# Patient Record
Sex: MALE | Race: WHITE | NOT HISPANIC OR LATINO | Employment: FULL TIME | URBAN - METROPOLITAN AREA
[De-identification: names, ages, dates, MRNs, and addresses within clinical notes are randomized per-mention and may not be internally consistent; named-entity substitution may affect disease eponyms.]

---

## 2017-02-10 ENCOUNTER — OCCUPATIONAL MEDICINE (OUTPATIENT)
Dept: URGENT CARE | Facility: PHYSICIAN GROUP | Age: 52
End: 2017-02-10
Payer: COMMERCIAL

## 2017-02-10 VITALS
OXYGEN SATURATION: 96 % | RESPIRATION RATE: 20 BRPM | DIASTOLIC BLOOD PRESSURE: 78 MMHG | HEIGHT: 65 IN | SYSTOLIC BLOOD PRESSURE: 126 MMHG | HEART RATE: 87 BPM | TEMPERATURE: 99 F | WEIGHT: 262 LBS | BODY MASS INDEX: 43.65 KG/M2

## 2017-02-10 DIAGNOSIS — Z02.1 PRE-EMPLOYMENT DRUG SCREENING: ICD-10-CM

## 2017-02-10 DIAGNOSIS — S83.92XA SPRAIN OF LEFT KNEE, UNSPECIFIED LIGAMENT, INITIAL ENCOUNTER: ICD-10-CM

## 2017-02-10 LAB
AMP AMPHETAMINE: NORMAL
COC COCAINE: NORMAL
INT CON NEG: NEGATIVE
INT CON POS: POSITIVE
MET METHAMPHETAMINES: NORMAL
OPI OPIATES: NORMAL
PCP PHENCYCLIDINE: NORMAL
POC DRUG COMMENT 753798-OCCUPATIONAL HEALTH: NORMAL
THC: NORMAL

## 2017-02-10 PROCEDURE — 80305 DRUG TEST PRSMV DIR OPT OBS: CPT | Mod: 29 | Performed by: PHYSICIAN ASSISTANT

## 2017-02-10 PROCEDURE — 99202 OFFICE O/P NEW SF 15 MIN: CPT | Mod: 29 | Performed by: PHYSICIAN ASSISTANT

## 2017-02-10 ASSESSMENT — PAIN SCALES - GENERAL: PAINLEVEL: 7=MODERATE-SEVERE PAIN

## 2017-02-10 NOTE — Clinical Note
"EMPLOYEE’S CLAIM FOR COMPENSATION/ REPORT OF INITIAL TREATMENT  FORM C-4    EMPLOYEE’S CLAIM - PROVIDE ALL INFORMATION REQUESTED   First Name  Mingo Last Name  Елена Birthdate                    1965                Sex  male Claim Number   Home Address  [address] Age  51 y.o. Height  1.651 m (5' 5\") Weight  118.842 kg (262 lb) [facility]     [place] Zip  [address] Telephone  [phone] (home)    Mailing Address  [address] [place] Zip  [address] Primary Language Spoken  English    Insurer   Third Party   Maria Teresa   Employee's Occupation (Job Title) When Injury or Occupational Disease Occurred       Employer's Name  [email or web address]  Telephone  [phone]    Employer Address  [address]  [place]  Zip  [address]    Date of Injury  1/14/2017               Hour of Injury  11:30 AM Date Employer Notified  1/23/2017 Last Day of Work after Injury or Occupational Disease  2/9/2017 Supervisor to Whom Injury Reported  Raimundo Bustos   Address or Location of Accident (if applicable)  [[facility]]   What were you doing at the time of accident? (if applicable)  Lifting 50+ lb boxes from container from floor.    How did this injury or occupational disease occur? (Be specific an answer in detail. Use additional sheet if necessary)  I was lifting over 68 boxes (50 lb each) from floor of container or to pallets using   proper lifting technique. I felt no pain while preforming this task. I though I was fine when  I went home that day (1/14/17). When I woke up Sunday (1/15/17) I could not bend  my left knee and it hurt so bad I could hardly walk on it that Sunday. I believe I over  worked my left knee which had been injured 26 yrs ago. I went to work on Monday   (1/16/17) and struggled to make it through the week, but I did. Monday, 1/23/17, I  called Raimundo" Akash and went to the doctor at [facility] and stayed home the rest of the   day. I returned to work 1/24/17 for light duty.   If you believe that you have an occupational disease, when did you first have knowledge of the disability and it relationship to your employment?  N/A Witnesses to the Accident  N/A      Nature of Injury or Occupational Disease  Strain  Part(s) of Body Injured or Affected  Knee (L), ,     I certify that the above is true and correct to the best of my knowledge and that I have provided this information in order to obtain the benefits of [place]  I hereby authorize any physician, chiropractor, surgeon, practitioner, or other person, any hospital, including [facility] or [facility], any medical service organization, any insurance company, or other institution or organization to release to each other, any medical or other information, including benefits paid or payable, pertinent to this injury or disease, except information relative to diagnosis, treatment and/or counseling for AIDS, psychological conditions, alcohol or controlled substances, for which I must give specific authorization.  A Photostat of this authorization shall be as valid as the original.     Date   Place   Employee’s Signature   THIS REPORT MUST BE COMPLETED AND MAILED WITHIN 3 WORKING DAYS OF TREATMENT   Place  [facility] [place]  Name of Facility  [facility]   Date  2/10/2017 Diagnosis  (Z02.1) Pre-employment drug screening  (S83.92XA) Sprain of left knee, unspecified ligament, initial encounter Is there evidence the injured employee was under the influence of alcohol and/or another controlled substance at the time of accident?   Hour  9:51 AM Description of Injury or Disease  Diagnoses of Pre-employment drug screening and Sprain of left knee, unspecified ligament, initial encounter were  pertinent to this visit. No   Treatment  Appears to be a minor left knee sprain.  He had an x-ray completed MEI which was negative.  We will order physical therapy  OTC meds for pain and swelling  Follow-up in one week  Have you advised the patient to remain off work five days or more? No   X-Ray Findings  Negative  Comments:x-ray completed MEI   If Yes   From Date  To Date      From information given by the employee, together with medical evidence, can you directly connect this injury or occupational disease as job incurred?  No  Comments:??? no obvious incident. He was lifting several heavy boxes however his pain started the next day. Possibly a work injury. Could be a result of his traumatic knee injury 26 years ago If No Full Duty  No Modified Duty  Yes   Is additional medical care by a physician indicated?  Yes If Modified Duty, Specify any Limitations / Restrictions  No climbing, kneeling, squatting  Weight limit 25 pounds   Do you know of any previous injury or disease contributing to this condition or occupational disease?                            Yes  Comments:Severe left knee injury ~26 years ago. Tore all 4 ligaments, nerve damage with subsequent drop foot. Pt had surgery. Minor tweaks throughout years, nothing major.   Date  2/15/2017 Print Doctor’s Name Dania Cottrell PA-C I certify the employer’s copy of  this form was mailed on:   Address  [address] [place] [address]. [address] Insurer’s Use Only     [facility]  [id number]    Provider’s Tax ID Number  [id number]  Telephone  Dept: [phone]        hiram-DANIA Freeman PA-C   e-Signature: Dr. Junior Sullivan, Medical Director Degree  [place]        ORIGINAL-TREATING PHYSICIAN OR CHIROPRACTOR    PAGE 2-INSURER/TPA    PAGE 3-EMPLOYER    PAGE 4-EMPLOYEE             Form C-4 (rev10/07)              BRIEF DESCRIPTION OF RIGHTS AND BENEFITS  (Pursuant to [place])    Notice of Injury or Occupational Disease (Incident Report Form C-1): If an injury  "or occupational disease (OD) arises out of and in the  course of employment, you must provide written notice to your employer as soon as practicable, but no later than 7 days after the accident or  OD. Your employer shall maintain a sufficient supply of the required forms.    Claim for Compensation (Form C-4): If medical treatment is sought, the form C-4 is available at the place of initial treatment. A completed  \"Claim for Compensation\" (Form C-4) must be filed within 90 days after an accident or OD. The treating physician or chiropractor must,  within 3 working days after treatment, complete and mail to the employer, the employer's insurer and third-party , the Claim for  Compensation.    Medical Treatment: If you require medical treatment for your on-the-job injury or OD, you may be required to select a physician or  chiropractor from a list provided by your workers’ compensation insurer, if it has contracted with an Organization for Managed Care (MCO) or  Preferred Provider Organization (PPO) or providers of health care. If your employer has not entered into a contract with an MCO or PPO, you  may select a physician or chiropractor from the Panel of Physicians and Chiropractors. Any medical costs related to your industrial injury or  OD will be paid by your insurer.    Temporary Total Disability (TTD): If your doctor has certified that you are unable to work for a period of at least 5 consecutive days, or 5  cumulative days in a 20-day period, or places restrictions on you that your employer does not accommodate, you may be entitled to TTD  compensation.    Temporary Partial Disability (TPD): If the wage you receive upon reemployment is less than the compensation for TTD to which you are  entitled, the insurer may be required to pay you TPD compensation to make up the difference. TPD can only be paid for a maximum of 24  months.    Permanent Partial Disability (PPD): When your medical condition is " stable and there is an indication of a PPD as a result of your injury or  OD, within 30 days, your insurer must arrange for an evaluation by a rating physician or chiropractor to determine the degree of your PPD. The  amount of your PPD award depends on the date of injury, the results of the PPD evaluation and your age and wage.    Permanent Total Disability (PTD): If you are medically certified by a treating physician or chiropractor as permanently and totally disabled  and have been granted a PTD status by your insurer, you are entitled to receive monthly benefits not to exceed 66 2/3% of your average  monthly wage. The amount of your PTD payments is subject to reduction if you previously received a PPD award.    Vocational Rehabilitation Services: You may be eligible for vocational rehabilitation services if you are unable to return to the job due to a  permanent physical impairment or permanent restrictions as a result of your injury or occupational disease.    Transportation and Per Sarah Reimbursement: You may be eligible for travel expenses and per sarah associated with medical treatment.    Reopening: You may be able to reopen your claim if your condition worsens after claim closure.    Appeal Process: If you disagree with a written determination issued by the insurer or the insurer does not respond to your request, you may  appeal to the Department of Administration, , by following the instructions contained in your determination letter. You must  appeal the determination within 70 days from the date of the determination letter at [address], [address], [place], [place]  [address], or [address], [address], [place] [address]. If you disagree with the  decision, you may appeal to the  Department of Administration, . You must file your appeal within 30 days from the date of the  decision  letter at [address],  [address], [place], [place] [address], or [address], [place], [place] [address]. If you  disagree with a decision of an , you may file a petition for judicial review with the District Court. You must do so within 30  days of the Appeal Officer’s decision. You may be represented by an  at your own expense or you may contact the [facility] for possible  representation.    [place]  for Injured Workers (NAIW): If you disagree with a  decision, you may request that NAIW represent you  without charge at an  Hearing. For information regarding denial of benefits, you may contact the [facility] at: [address], [address], [place] [address], [phone], or [address], [place] [address], [phone]    To File a Complaint with the Division: If you wish to file a complaint with the  of the Division of Industrial Relations (DIR),  please contact the Workers’ Compensation Section, [address], [address], [place], [place] [address], telephone [phone]) [phone], or  [address], [address], [place], [place] [address], telephone [phone].    For assistance with Workers’ Compensation Issues: you may contact the Office of the Governor Consumer Health Assistance, [address], [address], [place], [place] [address], Toll Free 1-793.235.1113, Web site: http://govcha.[facility].nv., E-mail  Isela@govcha.[facility].nv.                                                                                                                                                                                                                                   __________________________________________________________________                                                                   _________________                Employee Name / Signature                                                                                                                                                        Date                                                                                                                                                                                                     D-2 (rev. 10/07)

## 2017-02-10 NOTE — Clinical Note
"EMPLOYEE’S CLAIM FOR COMPENSATION/ REPORT OF INITIAL TREATMENT  FORM C-4    EMPLOYEE’S CLAIM - PROVIDE ALL INFORMATION REQUESTED   First Name  Mingo Last Name  Елена Birthdate                    1965                Sex  male Claim Number   Home Address  [address] Age  51 y.o. Height  1.651 m (5' 5\") Weight  118.842 kg (262 lb) N  [id number]   [facility] Zip  [address] Telephone  [phone] (home)    Mailing Address  [address] [facility] Zip  [address] Primary Language Spoken  English    Insurer  [place] Third Party   [place]   Employee's Occupation (Job Title) When Injury or Occupational Disease Occurred       Employer's Name  [employer]  Telephone  [phone]    Employer Address  [address]  [facility]  Zip  [address]   Date of Injury  1/14/2017               Hour of Injury  11:30 AM Date Employer Notified  1/23/2017 Last Day of Work after Injury or Occupational Disease  2/9/2017 Supervisor to Whom Injury Reported  Raimundo Bustos   Address or Location of Accident (if applicable)  [[facility]]   What were you doing at the time of accident? (if applicable)  Lifting 50+ lb boxes from container from floor.    How did this injury or occupational disease occur? (Be specific an answer in detail. Use additional sheet if necessary)  I was lifting over 68 boxes (50 lb each) from floor of container or to pallets using   proper lifting technique. I felt no pain while preforming this task. I though I was fine when  I went home that day (1/14/17). When I woke up Sunday (1/15/17) I could not bend  my left knee and it hurt so bad I could hardly walk on it that Sunday. I believe I over  worked my left knee which had been injured 26 yrs ago. I went to work on Monday   (1/16/17) and struggled to make it through the week, but I did. Monday, 1/23/17, I  called " Raimundo Bustos and went to the doctor at [facility] and stayed home the rest of the   day. I returned to work 1/24/17 for light duty.   If you believe that you have an occupational disease, when did you first have knowledge of the disability and it relationship to your employment?  N/A Witnesses to the Accident  N/A      Nature of Injury or Occupational Disease  Strain  Part(s) of Body Injured or Affected  Knee (L), ,     I certify that the above is true and correct to the best of my knowledge and that I have provided this information in order to obtain the benefits of [place] inclusive or Chapter 617 of [place]).  I hereby authorize any physician, chiropractor, surgeon, practitioner, or other person, any hospital, including [facility] or [facility], any medical service organization, any insurance company, or other institution or organization to release to each other, any medical or other information, including benefits paid or payable, pertinent to this injury or disease, except information relative to diagnosis, treatment and/or counseling for AIDS, psychological conditions, alcohol or controlled substances, for which I must give specific authorization.  A Photostat of this authorization shall be as valid as the original.     Date   Place   Employee’s Signature   THIS REPORT MUST BE COMPLETED AND MAILED WITHIN 3 WORKING DAYS OF TREATMENT   Place  [facility]  Name of Facility  [place]   Date  2/10/2017 Diagnosis  (Z02.1) Pre-employment drug screening  (S83.92XA) Sprain of left knee, unspecified ligament, initial encounter Is there evidence the injured employee was under the influence of alcohol and/or another controlled substance at the time of accident?   Hour  9:51 AM Description of Injury or Disease  Diagnoses of Pre-employment drug screening and Sprain of left knee, unspecified ligament, initial encounter  were pertinent to this visit. No   Treatment  Appears to be a minor left knee sprain.  He had an x-ray completed [facility] which was negative.  We will order physical therapy  OTC meds for pain and swelling  Follow-up in one week  Have you advised the patient to remain off work five days or more? No   X-Ray Findings  Negative  Comments:x-ray completed [employer]   If Yes   From Date  To Date      From information given by the employee, together with medical evidence, can you directly connect this injury or occupational disease as job incurred?  Yes If No Full Duty  No Modified Duty  Yes   Is additional medical care by a physician indicated?  Yes If Modified Duty, Specify any Limitations / Restrictions  No climbing, kneeling, squatting  Weight limit 25 pounds   Do you know of any previous injury or disease contributing to this condition or occupational disease?                            Yes  Comments:Severe left knee injury ~26 years ago. Tore all 4 ligaments, nerve damage with subsequent drop foot. Pt had surgery. Minor tweaks throughout years, nothing major.   Date  2/10/2017 Print Doctor’s Name Dania Cottrell PA-C I certify the employer’s copy of  this form was mailed on:   Address  [address] [place] [address]. [address] Insurer’s Use Only     [facility]  [address]    Provider’s Tax ID Number  [id number]  Telephone  Dept: [phone]        e-DANIA Freeman PA-C   e-Signature: Dr. Junior Sullivan, Medical Director Degree  [place]        ORIGINAL-TREATING PHYSICIAN OR CHIROPRACTOR    PAGE 2-INSURER/TPA    PAGE 3-EMPLOYER    PAGE 4-EMPLOYEE             Form C-4 (rev10/07)              BRIEF DESCRIPTION OF RIGHTS AND BENEFITS  (Pursuant to NRS 616C.050)    Notice of Injury or Occupational Disease (Incident Report Form C-1): If an injury or occupational disease (OD) arises out of and in the  course of employment, you must provide written notice to your employer as soon as practicable, but no later than 7 days after the  "accident or  OD. Your employer shall maintain a sufficient supply of the required forms.    Claim for Compensation (Form C-4): If medical treatment is sought, the form C-4 is available at the place of initial treatment. A completed  \"Claim for Compensation\" (Form C-4) must be filed within 90 days after an accident or OD. The treating physician or chiropractor must,  within 3 working days after treatment, complete and mail to the employer, the employer's insurer and third-party , the Claim for  Compensation.    Medical Treatment: If you require medical treatment for your on-the-job injury or OD, you may be required to select a physician or  chiropractor from a list provided by your workers’ compensation insurer, if it has contracted with an Organization for Managed Care (MCO) or  Preferred Provider Organization (PPO) or providers of health care. If your employer has not entered into a contract with an MCO or PPO, you  may select a physician or chiropractor from the Panel of Physicians and Chiropractors. Any medical costs related to your industrial injury or  OD will be paid by your insurer.    Temporary Total Disability (TTD): If your doctor has certified that you are unable to work for a period of at least 5 consecutive days, or 5  cumulative days in a 20-day period, or places restrictions on you that your employer does not accommodate, you may be entitled to TTD  compensation.    Temporary Partial Disability (TPD): If the wage you receive upon reemployment is less than the compensation for TTD to which you are  entitled, the insurer may be required to pay you TPD compensation to make up the difference. TPD can only be paid for a maximum of 24  months.    Permanent Partial Disability (PPD): When your medical condition is stable and there is an indication of a PPD as a result of your injury or  OD, within 30 days, your insurer must arrange for an evaluation by a rating physician or chiropractor to " determine the degree of your PPD. The  amount of your PPD award depends on the date of injury, the results of the PPD evaluation and your age and wage.    Permanent Total Disability (PTD): If you are medically certified by a treating physician or chiropractor as permanently and totally disabled  and have been granted a PTD status by your insurer, you are entitled to receive monthly benefits not to exceed 66 2/3% of your average  monthly wage. The amount of your PTD payments is subject to reduction if you previously received a PPD award.    Vocational Rehabilitation Services: You may be eligible for vocational rehabilitation services if you are unable to return to the job due to a  permanent physical impairment or permanent restrictions as a result of your injury or occupational disease.    Transportation and Per Sarah Reimbursement: You may be eligible for travel expenses and per sarah associated with medical treatment.    Reopening: You may be able to reopen your claim if your condition worsens after claim closure.    Appeal Process: If you disagree with a written determination issued by the insurer or the insurer does not respond to your request, you may  appeal to the Department of Administration, , by following the instructions contained in your determination letter. You must  appeal the determination within 70 days from the date of the determination letter at [address], [address], [place], [place]  [address], or [address] [facility] [address] [facility] [address], [place] [address]. If you disagree with the  decision, you may appeal to the  Department of Administration, . You must file your appeal within 30 days from the date of the  decision  letter at [address], [address], [place], [place] [address], or [address] [facility] [address], [place] [address]. If you  disagree with a decision of an , you may file a petition for  judicial review with the District Court. You must do so within 30  days of the Appeal Officer’s decision. You may be represented by an  at your own expense or you may contact the [facility] for possible  representation.    [place]  for Injured Workers (NAIW): If you disagree with a  decision, you may request that NAIW represent you  without charge at an  Hearing. For information regarding denial of benefits, you may contact the [facility] at: [address], [place] [address], [phone], or [address], [address], [place] [address], [phone]    To File a Complaint with the Division: If you wish to file a complaint with the  of the Division of Industrial Relations (DIR),  please contact the Workers’ Compensation Section, [address], [address], [place], [place] [address], telephone [phone], or  [address], [place] [address], telephone [phone].    For assistance with Workers’ Compensation Issues: you may contact the Office of the Governor Consumer Health Assistance, [address] [place] [address], [address], [place], [place] [address], Toll Free 1-180.365.6706, Web site: http://govcha.Critical access [email or web address]., E-mail  [email or web address].                                                                                                                                                                                                                                   __________________________________________________________________                                                                   _________________                Employee Name / Signature                                                                                                                                                       Date                                                                                                                                                                                                      D-2 (rev. 10/07)

## 2017-02-10 NOTE — MR AVS SNAPSHOT
"        Mingo Елена   2/10/2017 9:10 AM   Occupational Medicine   MRN: [id number]    Department:  [facility]   Dept Phone:  [phone]    Description:  Male : 1965   Provider:  Mingo Cottrell PA-C           Reason for Visit     Knee Injury WC IV 17 Lt Knee Injury. Pt states he injured his Lt knee after lifting boxes at work. Pain when walking. Pt has hx of knee injuries.      Allergies as of 2/10/2017     Allergen Noted Reactions    Erythromycin 02/15/2016   Unspecified    Pt states \"Years ago he had a high temp\".      You were diagnosed with     Pre-employment drug screening   [014152]       Sprain of left knee, unspecified ligament, initial encounter   [8980416]         Vital Signs     Blood Pressure Pulse Temperature Respirations Height Weight    126/78 mmHg 87 37.2 °C (99 °F) 20 1.651 m (5' 5\") 118.842 kg (262 lb)    Body Mass Index Oxygen Saturation Smoking Status             43.60 kg/m2 96% Never Smoker          Basic Information     Date Of Birth Sex Race Ethnicity Preferred Language    1965 Male White Non- English      Your appointments     2017  3:30 PM   Workers Compensation with [facility])    [address]  [place] [address] [phone]              Problem List              ICD-10-CM Priority Class Noted - Resolved    Atypical chest pain R07.89   2/15/2016 - Present      Health Maintenance        Date Due Completion Dates    IMM DTaP/Tdap/Td Vaccine (1 - Tdap) 1983 ---    COLONOSCOPY 2014 ---    IMM INFLUENZA (1) 2016 ---            Results     POCT 6 Panel Urine Drug Screen      Component    AMPHETAMINE    Neg    POC THC    Neg    COCAINE    Neg    OPIATES    Neg    PHENCYCLIDINE    Neg    METHAMPHETAMINES    Neg    POC Urine Drug Screen Comment    N/A    Internal Control Positive    Positive    Internal Control Negative    Negative                        Current " Immunizations     No immunizations on file.      Below and/or attached are the medications your provider expects you to take. Review all of your home medications and newly ordered medications with your provider and/or pharmacist. Follow medication instructions as directed by your provider and/or pharmacist. Please keep your medication list with you and share with your provider. Update the information when medications are discontinued, doses are changed, or new medications (including over-the-counter products) are added; and carry medication information at all times in the event of emergency situations     Allergies:  ERYTHROMYCIN - Unspecified               Medications  Valid as of: February 10, 2017 - 10:25 AM    Generic Name Brand Name Tablet Size Instructions for use    Albuterol Sulfate (Aero Soln) albuterol 108 (90 BASE) MCG/ACT Inhale 2 Puffs by mouth every 6 hours as needed for Shortness of Breath.        Allopurinol (Tab) ZYLOPRIM 300 MG Take 300 mg by mouth every day.        Atenolol (Tab) TENORMIN 25 MG Take 0.5 Tabs by mouth every day. Do not take if Systolic Blood Pressure <110, or Heart Rate <60        Fluticasone-Salmeterol (Aerosol) ADVAIR -21 MCG/ACT Inhale 2 Puffs by mouth 2 times a day.        Lisinopril (Tab) PRINIVIL 10 MG Take 0.5 Tabs by mouth every day.        Multiple Vitamins-Minerals (Tab) THERAGRAN-M  Take 2 Tabs by mouth every day.        .                 Medicines prescribed today were sent to:     [facility] [address] [place] [address] (), [place] - [address]    [address] () [place] [address]    Phone: [phone] Fax: [phone]    Open 24 Hours?: No      Medication refill instructions:       If your prescription bottle indicates you have medication refills left, it is not necessary to call your provider’s office. Please contact your pharmacy and they will refill your medication.    If your prescription bottle indicates you do not have any refills left, you may  request refills at any time through one of the following ways: The online OneWire system (except Urgent Care), by calling your provider’s office, or by asking your pharmacy to contact your provider’s office with a refill request. Medication refills are processed only during regular business hours and may not be available until the next business day. Your provider may request additional information or to have a follow-up visit with you prior to refilling your medication.   *Please Note: Medication refills are assigned a new Rx number when refilled electronically. Your pharmacy may indicate that no refills were authorized even though a new prescription for the same medication is available at the pharmacy. Please request the medicine by name with the pharmacy before contacting your provider for a refill.        Referral     A referral request has been sent to our patient care coordination department. Please allow 3-5 business days for us to process this request and contact you either by phone or mail. If you do not hear from us by the 5th business day, please call us at [phone].           OneWire Access Code: [email or web address]  Expires: 3/12/2017 10:25 AM    OneWire  A secure, online tool to manage your health information     [employer]’s OneWire® is a secure, online tool that connects you to your personalized health information from the privacy of your home -- day or night - making it very easy for you to manage your healthcare. Once the activation process is completed, you can even access your medical information using the OneWire adriano, which is available for free in the Apple Adriano store or Google Play store.     OneWire provides the following levels of access (as shown below):   My Chart Features   [facility] Doctor [facility]  Specialists [facility]  Urgent  Care Non-[facility]  Primary Care  Doctor   Email your healthcare team securely and privately 24/7 X X X    Manage appointments: schedule your next  appointment; view details of past/upcoming appointments X      Request prescription refills. X      View recent personal medical records, including lab and immunizations X X X X   View health record, including health history, allergies, medications X X X X   Read reports about your outpatient visits, procedures, consult and ER notes X X X X   See your discharge summary, which is a recap of your hospital and/or ER visit that includes your diagnosis, lab results, and care plan. X X       How to register for Aff[employer] Edge:  1. Go to  [email or web address].  2. Click on the Sign Up Now box, which takes you to the New Member Sign Up page. You will need to provide the following information:  a. Enter your Affinity Edge Access Code exactly as it appears at the top of this page. (You will not need to use this code after you’ve completed the sign-up process. If you do not sign up before the expiration date, you must request a new code.)   b. Enter your date of birth.   c. Enter your home email address.   d. Click Submit, and follow the next screen’s instructions.  3. Create a Affinity Edge ID. This will be your Affinity Edge login ID and cannot be changed, so think of one that is secure and easy to remember.  4. Create a Affinity Edge password. You can change your password at any time.  5. Enter your Password Reset Question and Answer. This can be used at a later time if you forget your password.   6. Enter your e-mail address. This allows you to receive e-mail notifications when new information is available in Affinity Edge.  7. Click Sign Up. You can now view your health information.    For assistance activating your Affinity Edge account, call [phone]

## 2017-02-10 NOTE — Clinical Note
[facility]   [address] - [place] [address]  Phone: [phone] - Fax: [phone]        [facility] Progress Report and Disability Certification  Date of Service: 2/10/2017   No Show:  No  Date / Time of Next Visit: 2/17/2017   Claim Information   Patient Name: Mingo Christiansen  Claim Number:     Employer: [employer]  Date of Injury: 1/14/2017     Insurer / TPA: [employer]  ID / SSN:     Occupation:    Diagnosis: Diagnoses of Pre-employment drug screening and Sprain of left knee, unspecified ligament, initial encounter were pertinent to this visit.    Medical Information   Related to Industrial Injury? No  Comments:??? no obvious incident. He was lifting several heavy boxes however his pain started the next day. Possibly a work injury. Could be a result of his traumatic knee injury 26 years ago    Subjective Complaints:  DOI: 1/14/17. Left knee injury. Was lifting 60-70 boxes, no pain or injury initially. Pain started the next morning, severe stiffness. Pain with walking. 10/10 at the time. Patient continued to work through the pain. On 1/23/17 patient went to [facility]. Had xray which showed severe osteoarthritis, no acute injury. He was given naproxen, PT referral, and light duty at work. Pt found out he is not contracted with [employer] and was told to come to [facility] for further workup and management. Taking NSAIDS prn, mild relief. Pt is continuing light duty.  Today, knee is improved. Pain is decreasing, 6-7/10 at worst. Tolerating light duty. Previous injury: Severe left knee injury ~26 years ago. Tore all 4 ligaments, nerve damage with subsequent drop foot. Pt had surgery. Minor tweaks throughout years, nothing major.   Objective Findings: Vitals review, well-appearing male in no apparent distress.  Tenderness over the anterior medial joint line of the left knee. Some mild edema. Range of motion within normal  limits. Some pain with varus stress. Anterior/posterior drawer negative, Lachman's negative. Stable knee. Ambulation normal. Neurovascularly at baseline.   Pre-Existing Condition(s): Severe left knee injury ~26 years ago. Tore all 4 ligaments, nerve damage with subsequent drop foot. Pt had surgery. Minor tweaks throughout years, nothing major.     Assessment:   Initial Visit    Status: Additional Care Required  Permanent Disability:No    Plan: Medication  Comments:OTC prn    Diagnostics: X-ray    Comments:  Completed at [facility] -  Negative for acute injury    Disability Information   Status: Released to Restricted Duty    From:  2/10/2017  Through: 2017 Restrictions are: Temporary   Physical Restrictions   Sitting:    Standing:    Stooping:    Bending:      Squattin hrs/day Walking:    Climbin hrs/day Pushing:      Pulling:    Other:    Reaching Above Shoulder (L):   Reaching Above Shoulder (R):       Reaching Below Shoulder (L):    Reaching Below Shoulder (R):      Not to exceed Weight Limits   Carrying(hrs):   Weight Limit(lb): < or = to 25 pounds Lifting(hrs):   Weight  Limit(lb): < or = to 25 pounds   Comments:      Repetitive Actions   Hands: i.e. Fine Manipulations from Grasping:     Feet: i.e. Operating Foot Controls:     Driving / Operate Machinery:     Physician Name: Dania Cottrell PA-C Physician Signature: DANIA Mohan PA-C e-Signature: Dr. Junior Sullivan, Medical Director   Clinic Name / Location: [address]  [place] [address] Clinic Phone Number: Dept: [phone]   Appointment Time: 9:10 Am Visit Start Time: 9:51 AM   Check-In Time:  9:34 Am Visit Discharge Time: 10:25 Am    Original-Treating Physician or Chiropractor    Page 2-Insurer/TPA    Page 3-Employer    Page 4-Employee

## 2017-02-10 NOTE — PROGRESS NOTES
"Subjective:      Mingo Christiansen is a 51 y.o. male who presents with Knee Injury      DOI: 1/14/17. Left knee injury. Was lifting 60-70 boxes, no pain or injury initially. Pain started the next morning, severe stiffness. Pain with walking. 10/10 at the time. Patient continued to work through the pain. On 1/23/17 patient went to [facility]. Had xray which showed severe osteoarthritis, no acute injury. He was given naproxen, PT referral, and light duty at work. Pt found out he is not contracted with [employer] and was told to come to [facility] for further workup and management. Taking NSAIDS prn, mild relief. Pt is continuing light duty.  Today, knee is improved. Pain is decreasing, 6-7/10 at worst. Tolerating light duty. Previous injury: Severe left knee injury ~26 years ago. Tore all 4 ligaments, nerve damage with subsequent drop foot. Pt had surgery. Minor tweaks throughout years, nothing major.     Knee Injury        Review of Systems   Musculoskeletal: Positive for joint pain.       Medications, Allergies, and current problem list reviewed today in Epic       Objective:     /78 mmHg  Pulse 87  Temp(Src) 37.2 °C (99 °F)  Resp 20  Ht 1.651 m (5' 5\")  Wt 118.842 kg (262 lb)  BMI 43.60 kg/m2  SpO2 96%     Physical Exam   Constitutional: He is oriented to person, place, and time. He appears well-developed and well-nourished. No distress.   HENT:   Head: Normocephalic and atraumatic.   Eyes: Conjunctivae and EOM are normal.   Neck: Normal range of motion. Neck supple.   Cardiovascular: Normal rate, regular rhythm and normal heart sounds.    No murmur heard.  Pulmonary/Chest: Effort normal and breath sounds normal. No respiratory distress. He has no wheezes.   Neurological: He is alert and oriented to person, place, and time.   Skin: Skin is warm and dry. He is not diaphoretic.   Psychiatric: He has a normal mood and affect. His behavior is normal. Judgment and thought content normal.   Nursing note and vitals " reviewed.      Vitals review, well-appearing male in no apparent distress.  Tenderness over the anterior medial joint line of the left knee. Some mild edema. Range of motion within normal limits. Some pain with varus stress. Anterior/posterior drawer negative, Lachman's negative. Stable knee. Ambulation normal. Neurovascularly at baseline.       Assessment/Plan:     1. Pre-employment drug screening  POCT 6 Panel Urine Drug Screen   2. Sprain of left knee, unspecified ligament, initial encounter  REFERRAL TO PHYSICAL THERAPY Reason for Therapy: Eval/Treat/Report     Possibly a work-related injury. It did happen the morning after lifting several heavy boxes. However there is no defined incident at work therefore may be a nonwork injury.  Appears to be a sprain of the knee. His old injury could be contributing. He was seen at [facility], x-ray which was unremarkable. They ordered physical therapy at that time. I believe that this is the proper course of action. He was injured approximately one month ago. He has had some improvement however he is still having pain. I will order physical therapy  Follow-up one week  OTC meds to conservative measures as discussed  Continue work restrictions  Return to clinic or go to ED if symptoms worsen or persist. Indications for ED discussed at length. Patient voices understanding. Red flags discussed.      Please note that this dictation was created using voice recognition software. I have made every reasonable attempt to correct obvious errors, but I expect that there are errors of grammar and possibly content that I did not discover before finalizing the note.

## 2017-02-10 NOTE — Clinical Note
[facility]   [address] - [place] [address]  Phone: [phone] - Fax: [phone]        [facility] Progress Report and Disability Certification  Date of Service: 2/10/2017   No Show:  No  Date / Time of Next Visit: 2/17/2017 @3:30 PM   Claim Information   Patient Name: Mingo Christiansen  Claim Number:     Employer: [employer]  Date of Injury: 1/14/2017     Insurer / TPA: Maria Teresa  ID / SSN:     Occupation:    Diagnosis: Diagnoses of Pre-employment drug screening and Sprain of left knee, unspecified ligament, initial encounter were pertinent to this visit.    Medical Information   Related to Industrial Injury? Yes    Subjective Complaints:  DOI: 1/14/17. Left knee injury. Was lifting 60-70 boxes, no pain or injury initially. Pain started the next morning, severe stiffness. Pain with walking. 10/10 at the time. Patient continued to work through the pain. On 1/23/17 patient went to [facility]. Had xray which showed severe osteoarthritis, no acute injury. He was given naproxen, PT referral, and light duty at work. Pt found out he is not contracted with [employer] and was told to come to [facility] for further workup and management. Taking NSAIDS prn, mild relief. Pt is continuing light duty.  Today, knee is improved. Pain is decreasing, 6-7/10 at worst. Tolerating light duty. Previous injury: Severe left knee injury ~26 years ago. Tore all 4 ligaments, nerve damage with subsequent drop foot. Pt had surgery. Minor tweaks throughout years, nothing major.   Objective Findings: Vitals review, well-appearing male in no apparent distress.  Tenderness over the anterior medial joint line of the left knee. Some mild edema. Range of motion within normal limits. Some pain with varus stress. Anterior/posterior drawer negative, Lachman's negative. Stable knee. Ambulation normal. Neurovascularly at baseline.   Pre-Existing Condition(s):  Severe left knee injury ~26 years ago. Tore all 4 ligaments, nerve damage with subsequent drop foot. Pt had surgery. Minor tweaks throughout years, nothing major.     Assessment:   Initial Visit    Status: Additional Care Required  Permanent Disability:No    Plan: Medication  Comments:OTC prn    Diagnostics: X-ray    Comments:  Completed at [facility] -  Negative for acute injury    Disability Information   Status: Released to Restricted Duty    From:  2/10/2017  Through: 2017 Restrictions are: Temporary   Physical Restrictions   Sitting:    Standing:    Stooping:    Bending:      Squattin hrs/day Walking:    Climbin hrs/day Pushing:      Pulling:    Other:    Reaching Above Shoulder (L):   Reaching Above Shoulder (R):       Reaching Below Shoulder (L):    Reaching Below Shoulder (R):      Not to exceed Weight Limits   Carrying(hrs):   Weight Limit(lb): < or = to 25 pounds Lifting(hrs):   Weight  Limit(lb): < or = to 25 pounds   Comments:      Repetitive Actions   Hands: i.e. Fine Manipulations from Grasping:     Feet: i.e. Operating Foot Controls:     Driving / Operate Machinery:     Physician Name: Dania Cottrell PA-C Physician Signature: DANIA Mohan PA-C e-Signature: Dr. Junior Sullivan, Medical Director   Clinic Name / Location: [address]  [place] [address] Clinic Phone Number: Dept: [phone]   Appointment Time: 9:10 Am Visit Start Time: 9:51 AM   Check-In Time:  9:34 Am Visit Discharge Time: 10:21 AM   Original-Treating Physician or Chiropractor    Page 2-Insurer/TPA    Page 3-Employer    Page 4-Employee

## 2017-02-17 ENCOUNTER — OCCUPATIONAL MEDICINE (OUTPATIENT)
Dept: URGENT CARE | Facility: PHYSICIAN GROUP | Age: 52
End: 2017-02-17
Payer: COMMERCIAL

## 2017-02-17 VITALS
TEMPERATURE: 98.6 F | OXYGEN SATURATION: 95 % | RESPIRATION RATE: 20 BRPM | DIASTOLIC BLOOD PRESSURE: 70 MMHG | HEART RATE: 88 BPM | SYSTOLIC BLOOD PRESSURE: 132 MMHG

## 2017-02-17 DIAGNOSIS — S86.912A STRAIN OF LEFT KNEE, INITIAL ENCOUNTER: ICD-10-CM

## 2017-02-17 PROCEDURE — 99213 OFFICE O/P EST LOW 20 MIN: CPT | Mod: 29 | Performed by: NURSE PRACTITIONER

## 2017-02-17 RX ORDER — COVID-19 ANTIGEN TEST
KIT MISCELLANEOUS
COMMUNITY
End: 2017-11-20

## 2017-02-17 ASSESSMENT — ENCOUNTER SYMPTOMS
WEAKNESS: 0
BRUISES/BLEEDS EASILY: 0
CHILLS: 0
TINGLING: 0
FEVER: 0
MYALGIAS: 0
SENSORY CHANGE: 0

## 2017-02-17 ASSESSMENT — PAIN SCALES - GENERAL: PAINLEVEL: 3=SLIGHT PAIN

## 2017-02-17 NOTE — MR AVS SNAPSHOT
"        Mingo Christiansen   2017 3:30 PM   Occupational Medicine   MRN: [id number]    Department:  [facility]   Dept Phone:  [phone]    Description:  Male : 1965   Provider:  MIKE Razo           Reason for Visit     Knee Injury WC FU 02/10/17 Lt KNee Injury. Pt states his knee is feeling a but better but not !00% better.      Allergies as of 2017     Allergen Noted Reactions    Erythromycin 02/15/2016   Unspecified    Pt states \"Years ago he had a high temp\".      You were diagnosed with     Strain of left knee, initial encounter   [[id number]]         Vital Signs     Blood Pressure Pulse Temperature Respirations Oxygen Saturation Smoking Status    132/70 mmHg 88 37 °C (98.6 °F) 20 95% Never Smoker       Basic Information     Date Of Birth Sex Race Ethnicity Preferred Language    1965 Male White Non- English      Problem List              ICD-10-CM Priority Class Noted - Resolved    Atypical chest pain R07.89   2/15/2016 - Present      Health Maintenance        Date Due Completion Dates    IMM DTaP/Tdap/Td Vaccine (1 - Tdap) 1983 ---    COLONOSCOPY 2014 ---    IMM INFLUENZA (1) 2016 ---            Current Immunizations     No immunizations on file.      Below and/or attached are the medications your provider expects you to take. Review all of your home medications and newly ordered medications with your provider and/or pharmacist. Follow medication instructions as directed by your provider and/or pharmacist. Please keep your medication list with you and share with your provider. Update the information when medications are discontinued, doses are changed, or new medications (including over-the-counter products) are added; and carry medication information at all times in the event of emergency situations     Allergies:  ERYTHROMYCIN - Unspecified               Medications  Valid as of: 2017 -  4:08 PM    Generic Name Brand Name Tablet Size " Instructions for use    Albuterol Sulfate (Aero Soln) albuterol 108 (90 BASE) MCG/ACT Inhale 2 Puffs by mouth every 6 hours as needed for Shortness of Breath.        Allopurinol (Tab) ZYLOPRIM 300 MG Take 300 mg by mouth every day.        Atenolol (Tab) TENORMIN 25 MG Take 0.5 Tabs by mouth every day. Do not take if Systolic Blood Pressure <110, or Heart Rate <60        Fluticasone-Salmeterol (Aerosol) ADVAIR -21 MCG/ACT Inhale 2 Puffs by mouth 2 times a day.        Lisinopril (Tab) PRINIVIL 10 MG Take 0.5 Tabs by mouth every day.        Multiple Vitamins-Minerals (Tab) THERAGRAN-M  Take 2 Tabs by mouth every day.        Naproxen Sodium (Cap) Naproxen Sodium 220 MG Take  by mouth.        .                 Medicines prescribed today were sent to:     [facility] [address] [place] [address]), [place] - [address]) [place] [address]    Phone: [phone] Fax: [phone]    Open 24 Hours?: No      Medication refill instructions:       If your prescription bottle indicates you have medication refills left, it is not necessary to call your provider’s office. Please contact your pharmacy and they will refill your medication.    If your prescription bottle indicates you do not have any refills left, you may request refills at any time through one of the following ways: The online iRezQ system (except Urgent Care), by calling your provider’s office, or by asking your pharmacy to contact your provider’s office with a refill request. Medication refills are processed only during regular business hours and may not be available until the next business day. Your provider may request additional information or to have a follow-up visit with you prior to refilling your medication.   *Please Note: Medication refills are assigned a new Rx number when refilled electronically. Your pharmacy may indicate that no refills were authorized even though a new prescription for the same medication is available  at the pharmacy. Please request the medicine by name with the pharmacy before contacting your provider for a refill.           Leapfrog Online Access Code: [email or web address]  Expires: 3/12/2017 10:25 AM    Leapfrog Online  A secure, online tool to manage your health information     [facility]’s Leapfrog Online® is a secure, online tool that connects you to your personalized health information from the privacy of your home -- day or night - making it very easy for you to manage your healthcare. Once the activation process is completed, you can even access your medical information using the Leapfrog Online adriano, which is available for free in the Apple Adriano store or Google Play store.     Leapfrog Online provides the following levels of access (as shown below):   My Chart Features   [facility]  Specialists [facility]  Urgent  Care [facility]  Primary Care  Doctor   Email your healthcare team securely and privately 24/7 X X X    Manage appointments: schedule your next appointment; view details of past/upcoming appointments X      Request prescription refills. X      View recent personal medical records, including lab and immunizations X X X X   View health record, including health history, allergies, medications X X X X   Read reports about your outpatient visits, procedures, consult and ER notes X X X X   See your discharge summary, which is a recap of your hospital and/or ER visit that includes your diagnosis, lab results, and care plan. X X       How to register for Leapfrog Online:  1. Go to  [email or web address].  2. Click on the Sign Up Now box, which takes you to the New Member Sign Up page. You will need to provide the following information:  a. Enter your Leapfrog Online Access Code exactly as it appears at the top of this page. (You will not need to use this code after you’ve completed the sign-up process. If you do not sign up before the expiration date, you must request a new code.)   b. Enter your date of birth.   c. Enter your home  email address.   d. Click Submit, and follow the next screen’s instructions.  3. Create a [employer] ID. This will be your GT [employer] login ID and cannot be changed, so think of one that is secure and easy to remember.  4. Create a [employer] password. You can change your password at any time.  5. Enter your Password Reset Question and Answer. This can be used at a later time if you forget your password.   6. Enter your e-mail address. This allows you to receive e-mail notifications when new information is available in [employer].  7. Click Sign Up. You can now view your health information.    For assistance activating your [employer] account, call [phone]

## 2017-02-17 NOTE — Clinical Note
"   [facility]   [address] [place] [address]. [address] - [place] [address]  Phone: [phone] - Fax: [phone]        [facility] Progress Report and Disability Certification  Date of Service: 2/17/2017   No Show:  No  Date / Time of Next Visit:  MMI/discharge   Claim Information   Patient Name: Mingo Christiansen  Claim Number:     Employer: [employer]  Date of Injury: 1/14/2017     Insurer / TPA: Maria Teresa  ID / SSN:     Occupation:    Diagnosis: The encounter diagnosis was Strain of left knee, initial encounter.    Medical Information   Related to Industrial Injury? No    Subjective Complaints:  DOI 1/14/17. [employer] [facility] was seen by [facility] ([facility]) for this originally without worker's compensation notification. [facility] is here for evaluation of left knee per request of employer. [employer] [facility] has  \"3 ligaments still torn, I have drop foot and nerve damage\" per [employer]. Lifting boxes > 25 # increases pain and will get help for this. No swelling to knee joint, no trauma, will take Naproxen 500 with swelling only as needed. Pain level 3/10 with motion, medial aspect pain when occurs \"not all the time\". No problems with climbing, stairs, squatting, pushing on pedal. No problems with walking. Denies numbness/tingling.   Objective Findings: Full ROM of left knee joint, no problems with ambulation, able to step up and down on exam table step, skin p/w/d, skin sensation intact. No swelling seen, no redness, no deformity, no crepitus.    Pre-Existing Condition(s):     Assessment:   Initial Visit    Status: Discharged /  MMI  Permanent Disability:No    Plan:   Comments:Naproxen as needed    Diagnostics:      Comments:       Disability Information   Status: Released to Full Duty    From:     Through:   Restrictions are:     Physical Restrictions   Sitting:    Standing:    Stooping:    Bending:      Squatting:    Walking:    Climbing:    Pushing:      Pulling:  "  Other:    Reaching Above Shoulder (L):   Reaching Above Shoulder (R):       Reaching Below Shoulder (L):    Reaching Below Shoulder (R):      Not to exceed Weight Limits   Carrying(hrs):   Weight Limit(lb):   Lifting(hrs):   Weight  Limit(lb):     Comments: Release to full duty, no restrictions.    Repetitive Actions   Hands: i.e. Fine Manipulations from Grasping:     Feet: i.e. Operating Foot Controls:     Driving / Operate Machinery:     Physician Name: MIKE Razo Physician Signature: ENRICO Li e-Signature: Dr. Junior Sullivan, Medical Director   Clinic Name / Location: [address]  [place] [address] Clinic Phone Number: Dept: [phone]   Appointment Time: 3:30 Pm Visit Start Time: 3:26 PM   Check-In Time:  3:21 Pm Visit Discharge Time:  4:07 PM   Original-Treating Physician or Chiropractor    Page 2-Insurer/TPA    Page 3-Employer    Page 4-Employee

## 2017-02-18 NOTE — PROGRESS NOTES
"Subjective:      Mingo Christiansen is a 51 y.o. male who presents with Knee Injury      DOI 1/14/17.  was seen by [facility] ([facility]) for this originally without worker's compensation notification. [facility] is here for evaluation of left knee per request of employer. States has  \"3 ligaments still torn, I have drop foot and nerve damage\" per [employer]. Lifting boxes > 25 # increases pain and will get help for this. No swelling to knee joint, no trauma, will take Naproxen 500 with swelling only as needed. Pain level 3/10 with motion, medial aspect pain when occurs \"not all the time\". No problems with climbing, stairs, squatting, pushing on pedal. No problems with walking. Denies numbness/tingling.     Knee Injury  Pertinent negatives include no chills, fever, myalgias or weakness.   see above    PMH:  has a past medical history of Hypertension; Asthma; and Gout.  MEDS:   Current outpatient prescriptions:   •  Naproxen Sodium (ALEVE) 220 MG Cap, Take  by mouth., Disp: , Rfl:   •  lisinopril (PRINIVIL) 10 MG Tab, Take 0.5 Tabs by mouth every day., Disp: 1 Tab, Rfl: 0  •  atenolol (TENORMIN) 25 MG Tab, Take 0.5 Tabs by mouth every day. Do not take if Systolic Blood Pressure <110, or Heart Rate <60, Disp: 1 Tab, Rfl: 0  •  allopurinol (ZYLOPRIM) 300 MG Tab, Take 300 mg by mouth every day., Disp: , Rfl:   •  therapeutic multivitamin-minerals (THERAGRAN-M) Tab, Take 2 Tabs by mouth every day., Disp: , Rfl:   •  albuterol (VENTOLIN OR PROVENTIL) 108 (90 BASE) MCG/ACT Aero Soln inhalation aerosol, Inhale 2 Puffs by mouth every 6 hours as needed for Shortness of Breath., Disp: , Rfl:   •  fluticasone-salmeterol (ADVAIR HFA) 115-21 MCG/ACT inhaler, Inhale 2 Puffs by mouth 2 times a day., Disp: , Rfl:   ALLERGIES:   Allergies   Allergen Reactions   • Erythromycin Unspecified     Pt states \"Years ago he had a high temp\".     SURGHX:   Past Surgical History   Procedure Laterality Date   • Other orthopedic surgery  1995     L " Knee     SOCHX:  reports that he has never smoked. He does not have any smokeless tobacco history on file. He reports that he drinks alcohol. He reports that he does not use illicit drugs.  FH: Family history was reviewed, no pertinent findings to report      Review of Systems   Constitutional: Negative for fever, chills and malaise/fatigue.   Musculoskeletal: Negative for myalgias and joint pain.   Neurological: Negative for tingling, sensory change and weakness.   Endo/Heme/Allergies: Does not bruise/bleed easily.          Objective:     /70 mmHg  Pulse 88  Temp(Src) 37 °C (98.6 °F)  Resp 20  SpO2 95%     Physical Exam   Constitutional: He is oriented to person, place, and time. He appears well-developed and well-nourished. No distress.   HENT:   Head: Normocephalic.   Eyes: Conjunctivae and EOM are normal. Pupils are equal, round, and reactive to light.   Cardiovascular: Normal rate.    Pulmonary/Chest: Effort normal.   Musculoskeletal: Normal range of motion. He exhibits no edema or tenderness.        Left knee: He exhibits normal range of motion, no swelling, no effusion, no ecchymosis, no deformity, no laceration, no erythema, normal alignment, no LCL laxity, normal patellar mobility, no bony tenderness, normal meniscus and no MCL laxity. No tenderness found.   Neurological: He is alert and oriented to person, place, and time.   Skin: Skin is warm and dry. He is not diaphoretic. No erythema.   Vitals reviewed.      Full ROM of left knee joint, no problems with ambulation, able to step up and down on exam table step, skin p/w/d, skin sensation intact. No swelling seen, no redness, no deformity, no crepitus.        Assessment/Plan:     1. Strain of left knee, initial encounter    May use cool compresses for swelling prn  May utilize RICE method prn  May use Naproxen as directed for pain/swelling  May apply topical analgesics prn  Perform proper body mechanics with lifting, twisting, bending and  walking. Ask for assistance with heavy objects prn  Monitor for deformity, numbness/tingling in toes, decreased ROM- need re-evaluation  Discharge/MMI

## 2017-06-13 ENCOUNTER — OFFICE VISIT (OUTPATIENT)
Dept: URGENT CARE | Facility: PHYSICIAN GROUP | Age: 52
End: 2017-06-13
Payer: COMMERCIAL

## 2017-06-13 VITALS
BODY MASS INDEX: 42.53 KG/M2 | HEART RATE: 99 BPM | TEMPERATURE: 97.9 F | HEIGHT: 67 IN | OXYGEN SATURATION: 96 % | SYSTOLIC BLOOD PRESSURE: 122 MMHG | RESPIRATION RATE: 16 BRPM | DIASTOLIC BLOOD PRESSURE: 70 MMHG | WEIGHT: 271 LBS

## 2017-06-13 DIAGNOSIS — M17.12 ARTHRITIS OF LEFT KNEE: ICD-10-CM

## 2017-06-13 DIAGNOSIS — Z98.890 HISTORY OF LEFT KNEE SURGERY: ICD-10-CM

## 2017-06-13 PROCEDURE — 99202 OFFICE O/P NEW SF 15 MIN: CPT | Performed by: NURSE PRACTITIONER

## 2017-06-13 RX ORDER — NAPROXEN 500 MG/1
500 TABLET ORAL 2 TIMES DAILY WITH MEALS
Qty: 60 TAB | Refills: 0 | Status: SHIPPED
Start: 2017-06-13 | End: 2020-01-13

## 2017-06-13 RX ORDER — CEPHALEXIN 500 MG/1
CAPSULE ORAL
COMMUNITY
Start: 2017-03-30 | End: 2017-11-20

## 2017-06-13 RX ORDER — HYDROCODONE BITARTRATE AND ACETAMINOPHEN 5; 325 MG/1; MG/1
TABLET ORAL
COMMUNITY
Start: 2017-05-11 | End: 2017-11-20

## 2017-06-13 RX ORDER — TRAMADOL HYDROCHLORIDE 50 MG/1
50 TABLET ORAL EVERY 4 HOURS PRN
Qty: 20 TAB | Refills: 0 | Status: SHIPPED | OUTPATIENT
Start: 2017-06-13 | End: 2017-11-20

## 2017-06-13 ASSESSMENT — PAIN SCALES - GENERAL: PAINLEVEL: 6=MODERATE PAIN

## 2017-06-13 ASSESSMENT — ENCOUNTER SYMPTOMS
TINGLING: 0
FEVER: 0
ARTHRALGIAS: 1
CHILLS: 0

## 2017-06-13 NOTE — PROGRESS NOTES
"Subjective:      Mingo Christiansen is a 51 y.o. male who presents with Knee Pain            Knee Pain  This is a new problem. The current episode started in the past 7 days. The problem occurs constantly. Associated symptoms include arthralgias. Pertinent negatives include no chills, fever or rash. The symptoms are aggravated by walking.   This is a chronic issue Review of Systems   Constitutional: Negative for fever and chills.   Musculoskeletal: Positive for joint pain and arthralgias.   Skin: Negative for itching and rash.   Neurological: Negative for tingling.     Allergies, medications and history reviewed by me today       Objective:     /70 mmHg  Pulse 99  Temp(Src) 36.6 °C (97.9 °F)  Resp 16  Ht 1.702 m (5' 7\")  Wt 122.925 kg (271 lb)  BMI 42.43 kg/m2  SpO2 96%     Physical Exam   Constitutional: He is oriented to person, place, and time. He appears well-developed and well-nourished. No distress.   Cardiovascular: Normal rate, regular rhythm and normal heart sounds.    No murmur heard.  Pulmonary/Chest: Effort normal and breath sounds normal.   Musculoskeletal: Normal range of motion.        Left knee: He exhibits normal range of motion, no swelling and no effusion. Tenderness found. Medial joint line and lateral joint line tenderness noted.   Neurological: He is alert and oriented to person, place, and time.   Skin: Skin is warm and dry.   Nursing note and vitals reviewed.              Assessment/Plan:     1. Arthritis of left knee  naproxen (NAPROSYN) 500 MG Tab    tramadol (ULTRAM) 50 MG Tab    REFERRAL TO SPORTS MEDICINE   2. History of left knee surgery       He has acute on chronic knee pain from old injury and subsequent surgery.  Trial of tramadol for severe nighttime pain.  Referral to our Sports Medicine dept.  Differential diagnosis, natural history, supportive care, and indications for immediate follow-up discussed at length.         "

## 2017-06-13 NOTE — MR AVS SNAPSHOT
"        Mingo Christiansen   2017 1:55 PM   Office Visit   MRN: [id number]    Department:  [facility]   Dept Phone:  [phone]    Description:  Male : 1965   Provider:  AYDE Moctezuma           Reason for Visit     Knee Pain x2 weeks. Pt complains of constant Lt knee pain. Hurts to walk, bend.      Allergies as of 2017     Allergen Noted Reactions    Erythromycin 02/15/2016   Unspecified    Pt states \"Years ago he had a high temp\".      You were diagnosed with     Arthritis of left knee   [695984]       History of left knee surgery   [888152]         Vital Signs     Blood Pressure Pulse Temperature Respirations Height Weight    122/70 mmHg 99 36.6 °C (97.9 °F) 16 1.702 m (5' 7\") 122.925 kg (271 lb)    Body Mass Index Oxygen Saturation Smoking Status             42.43 kg/m2 96% Never Smoker          Basic Information     Date Of Birth Sex Race Ethnicity Preferred Language    1965 Male White Non- English      Problem List              ICD-10-CM Priority Class Noted - Resolved    Atypical chest pain R07.89   2/15/2016 - Present      Health Maintenance        Date Due Completion Dates    IMM DTaP/Tdap/Td Vaccine (1 - Tdap) 1983 ---    COLONOSCOPY 2014 ---            Current Immunizations     No immunizations on file.      Below and/or attached are the medications your provider expects you to take. Review all of your home medications and newly ordered medications with your provider and/or pharmacist. Follow medication instructions as directed by your provider and/or pharmacist. Please keep your medication list with you and share with your provider. Update the information when medications are discontinued, doses are changed, or new medications (including over-the-counter products) are added; and carry medication information at all times in the event of emergency situations     Allergies:  ERYTHROMYCIN - Unspecified               Medications  Valid as of: 2017 -  " 2:18 PM    Generic Name Brand Name Tablet Size Instructions for use    Albuterol Sulfate (Aero Soln) albuterol 108 (90 BASE) MCG/ACT Inhale 2 Puffs by mouth every 6 hours as needed for Shortness of Breath.        Allopurinol (Tab) ZYLOPRIM 300 MG Take 300 mg by mouth every day.        Atenolol (Tab) TENORMIN 25 MG Take 0.5 Tabs by mouth every day. Do not take if Systolic Blood Pressure <110, or Heart Rate <60        Cephalexin (Cap) KEFLEX 500 MG         Coenzyme Q10   Take  by mouth.        Fluticasone-Salmeterol (Aerosol) ADVAIR -21 MCG/ACT Inhale 2 Puffs by mouth 2 times a day.        Hydrocodone-Acetaminophen (Tab) NORCO 5-325 MG         Lisinopril (Tab) PRINIVIL 10 MG Take 0.5 Tabs by mouth every day.        Multiple Vitamins-Minerals (Tab) THERAGRAN-M  Take 2 Tabs by mouth every day.        Mupirocin (Ointment) BACTROBAN 2 %         Naproxen (Tab) NAPROSYN 500 MG Take 1 Tab by mouth 2 times a day, with meals.        Naproxen Sodium (Cap) Naproxen Sodium 220 MG Take  by mouth.        TraMADol HCl (Tab) ULTRAM 50 MG Take 1 Tab by mouth every four hours as needed.        .                 Medicines prescribed today were sent to:     [facility] [address] [place] [address]), [place] - [id number]) [place] [address]    Phone: [phone] Fax: [phone]    Open 24 Hours?: No      Medication refill instructions:       If your prescription bottle indicates you have medication refills left, it is not necessary to call your provider’s office. Please contact your pharmacy and they will refill your medication.    If your prescription bottle indicates you do not have any refills left, you may request refills at any time through one of the following ways: The online [employer]ong[employer] system (except Urgent Care), by calling your provider’s office, or by asking your pharmacy to contact your provider’s office with a refill request. Medication refills are processed only during regular business hours and  may not be available until the next business day. Your provider may request additional information or to have a follow-up visit with you prior to refilling your medication.   *Please Note: Medication refills are assigned a new Rx number when refilled electronically. Your pharmacy may indicate that no refills were authorized even though a new prescription for the same medication is available at the pharmacy. Please request the medicine by name with the pharmacy before contacting your provider for a refill.        Referral     A referral request has been sent to our patient care coordination department. Please allow 3-5 business days for us to process this request and contact you either by phone or mail. If you do not hear from us by the 5th business day, please call us at [phone].           [employer] Access Code: [email or web address]  Expires: 7/13/2017  2:18 PM    [employer]  A secure, online tool to manage your health information     [employer]’s Loco Partners® is a secure, online tool that connects you to your personalized health information from the privacy of your home -- day or night - making it very easy for you to manage your healthcare. Once the activation process is completed, you can even access your medical information using the L[employer] Partners adriano, which is available for free in the Apple Adriano store or Google Play store.     [employer] provides the following levels of access (as shown below):   My Chart Features   [facility] Doctor [facility]  Urgent  Care Non-[facility]  Primary Care  Doctor   Email your healthcare team securely and privately 24/7 X X X    Manage appointments: schedule your next appointment; view details of past/upcoming appointments X      Request prescription refills. X      View recent personal medical records, including lab and immunizations X X X X   View health record, including health history, allergies, medications X X X X   Read reports about your outpatient visits,  procedures, consult and ER notes X X X X   See your discharge summary, which is a recap of your hospital and/or ER visit that includes your diagnosis, lab results, and care plan. X X       How to register for ARX:  1. Go to  [email or web address].  2. Click on the Sign Up Now box, which takes you to the New Member Sign Up page. You will need to provide the following information:  a. Enter your ARX Access Code exactly as it appears at the top of this page. (You will not need to use this code after you’ve completed the sign-up process. If you do not sign up before the expiration date, you must request a new code.)   b. Enter your date of birth.   c. Enter your home email address.   d. Click Submit, and follow the next screen’s instructions.  3. Create a [email or web address] ID. This will be your [email or web address] login ID and cannot be changed, so think of one that is secure and easy to remember.  4. Create a [email or web address] password. You can change your password at any time.  5. Enter your Password Reset Question and Answer. This can be used at a later time if you forget your password.   6. Enter your e-mail address. This allows you to receive e-mail notifications when new information is available in ARX.  7. Click Sign Up. You can now view your health information.    For assistance activating your ARX account, call [phone]

## 2017-06-13 NOTE — Clinical Note
June 13, 2017        Mingo Елена  [address] [place] [address]        Mingo was seen in our clinic today and he is excused from work for today and tomorrow.  If you have any questions or concerns, please don't hesitate to call.        Sincerely,        AG Moctezuma.    Electronically Signed

## 2017-06-15 ENCOUNTER — APPOINTMENT (OUTPATIENT)
Dept: RADIOLOGY | Facility: IMAGING CENTER | Age: 52
End: 2017-06-15
Attending: FAMILY MEDICINE
Payer: COMMERCIAL

## 2017-06-15 ENCOUNTER — OFFICE VISIT (OUTPATIENT)
Dept: MEDICAL GROUP | Facility: CLINIC | Age: 52
End: 2017-06-15
Payer: COMMERCIAL

## 2017-06-15 VITALS
RESPIRATION RATE: 20 BRPM | BODY MASS INDEX: 42.53 KG/M2 | DIASTOLIC BLOOD PRESSURE: 78 MMHG | HEART RATE: 100 BPM | HEIGHT: 67 IN | WEIGHT: 271 LBS | SYSTOLIC BLOOD PRESSURE: 122 MMHG | TEMPERATURE: 98.1 F | OXYGEN SATURATION: 93 %

## 2017-06-15 DIAGNOSIS — M17.12 ARTHRITIS OF KNEE, LEFT: ICD-10-CM

## 2017-06-15 DIAGNOSIS — M25.562 CHRONIC PAIN OF LEFT KNEE: ICD-10-CM

## 2017-06-15 DIAGNOSIS — G89.29 CHRONIC PAIN OF LEFT KNEE: ICD-10-CM

## 2017-06-15 PROCEDURE — 99203 OFFICE O/P NEW LOW 30 MIN: CPT | Mod: 25 | Performed by: FAMILY MEDICINE

## 2017-06-15 PROCEDURE — 73564 X-RAY EXAM KNEE 4 OR MORE: CPT | Mod: TC,LT | Performed by: FAMILY MEDICINE

## 2017-06-15 PROCEDURE — 73565 X-RAY EXAM OF KNEES: CPT | Mod: 26 | Performed by: FAMILY MEDICINE

## 2017-06-15 RX ORDER — TRIAMCINOLONE ACETONIDE 40 MG/ML
40 INJECTION, SUSPENSION INTRA-ARTICULAR; INTRAMUSCULAR ONCE
Status: COMPLETED | OUTPATIENT
Start: 2017-06-15 | End: 2017-06-15

## 2017-06-15 RX ADMIN — TRIAMCINOLONE ACETONIDE 40 MG: 40 INJECTION, SUSPENSION INTRA-ARTICULAR; INTRAMUSCULAR at 18:47

## 2017-06-16 NOTE — PROGRESS NOTES
Subjective:      Mingo Christiansen is a 51 y.o. male who presents with Knee Pain          Knee Pain      CHIEF COMPLAINT:  Mingo Christiansen male presenting at the request of AYDE Moctezuma for evaluation of knee pain.     Mingo Christiansen is complaining of left knee pain  present for several years.  Pain is at the medial  knee  Quality is aching, sharp  Pain is non-radiating   Improved with rest  Aggravated by after sitting for long periods of time  previous knee injury LEFT knee dislocation with ORIF and hardware  Prior Treatments: Physical Therapy several yrs ago (22 yrs)  Prior studies: X-Ray, at [facility]  Medications tried for pain include: naproxen (OTC), from [facility] with mild improvement  Mechanical Symptom history: No Locking and Stiffness    PAST MEDICAL HISTORY:   History reviewed. No pertinent past medical history.    PMH:  has a past medical history of Hypertension; Asthma; and Gout.  MEDS:   Current outpatient prescriptions:   •  cephALEXin (KEFLEX) 500 MG Cap, , Disp: , Rfl:   •  hydrocodone-acetaminophen (NORCO) 5-325 MG Tab per tablet, , Disp: , Rfl:   •  mupirocin (BACTROBAN) 2 % Ointment, , Disp: , Rfl:   •  Coenzyme Q10 (CO Q 10 PO), Take  by mouth., Disp: , Rfl:   •  naproxen (NAPROSYN) 500 MG Tab, Take 1 Tab by mouth 2 times a day, with meals., Disp: 60 Tab, Rfl: 0  •  tramadol (ULTRAM) 50 MG Tab, Take 1 Tab by mouth every four hours as needed., Disp: 20 Tab, Rfl: 0  •  Naproxen Sodium (ALEVE) 220 MG Cap, Take  by mouth., Disp: , Rfl:   •  lisinopril (PRINIVIL) 10 MG Tab, Take 0.5 Tabs by mouth every day., Disp: 1 Tab, Rfl: 0  •  atenolol (TENORMIN) 25 MG Tab, Take 0.5 Tabs by mouth every day. Do not take if Systolic Blood Pressure <110, or Heart Rate <60, Disp: 1 Tab, Rfl: 0  •  allopurinol (ZYLOPRIM) 300 MG Tab, Take 300 mg by mouth every day., Disp: , Rfl:   •  albuterol (VENTOLIN OR PROVENTIL) 108 (90 BASE) MCG/ACT Aero Soln inhalation aerosol, Inhale 2 Puffs by mouth every 6  "hours as needed for Shortness of Breath., Disp: , Rfl:   •  fluticasone-salmeterol (ADVAIR HFA) 115-21 MCG/ACT inhaler, Inhale 2 Puffs by mouth 2 times a day., Disp: , Rfl:   •  therapeutic multivitamin-minerals (THERAGRAN-M) Tab, Take 2 Tabs by mouth every day., Disp: , Rfl:   ALLERGIES:   Allergies   Allergen Reactions   • Erythromycin Unspecified     Pt states \"Years ago he had a high temp\".     SURGHX:   Past Surgical History   Procedure Laterality Date   • Other orthopedic surgery  1995     L Knee     SOCHX:  reports that he has never smoked. He has never used smokeless tobacco. He reports that he drinks alcohol. He reports that he does not use illicit drugs.  FH: Family history was reviewed, no pertinent findings to report         PHYSICAL EXAM:  /78 mmHg  Pulse 100  Temp(Src) 36.7 °C (98.1 °F)  Resp 20  Ht 1.702 m (5' 7.01\")  Wt 122.925 kg (271 lb)  BMI 42.43 kg/m2  SpO2 93%     obese in no apparent distress, alert and oriented x 3.  Gait: antalgic     RIGHT Knee:  Severe Varus and No Swelling  Range of Motion Intact  Trace effusion  Patellar No tenderness and no apprehension  Medial Joint Line Non-tender and NEGATIVE Fortino  Lateral Joint Line Non-tender and NEGATIVE Fortino  Trace Laxity with Varus stress  Trace Laxity with Valgus stress  Lachman's testing is Trace  Posterior Drawer Testing is Trace  The leg is otherwise neurovascularly intact    LEFT Knee:  Severe Varus and No Swelling   Range of Motion Intact  Trace effusion  Patellar No tenderness and no apprehension  Medial Joint Line Tenderness and NEGATIVE Fortino  Lateral Joint Line Non-tender and NEGATIVE Fortino  Trace Laxity with Varus stress  1+ Laxity with Valgus stress  Lachman's testing is Trace  Posterior Drawer Testing is Trace  The leg is otherwise neurovascularly intact    Additional Findings: Tight hamstrings      1. Chronic pain of left knee  DX-KNEE COMPLETE 4+ LEFT    DX-KNEES-AP BILATERAL STANDING   2. Arthritis of " knee, left  triamcinolone acetonide (KENALOG-40) injection 40 mg      Return for recheck: 4 weeks    X-rays  taken here and reviewed by me  LEFT knee series including bilateral weightbearing view demonstrates tricompartmental osteoarthritis worse at the patellofemoral and medial compartment of the left knee  Lateral femoral condyle screw in place with some vascular clips noted laterally  No fracture, normal alignment     ROS  Physical Exam    Think AMARA Licea for allowing me to participate in caring for your patient.      ADDENDUM:  June 22, 2017  5 PM  Received message regarding lack of procedure note for corticosteroid injection of the left knee    PROCEDURE NOTE:    Risks and benefits discussed, informed consent obtained  LEFT knee prepped sterile  22-gauge needle utilized for injection of 40 mg of Kenalog and 5 cc of Marcaine  Utilizing the patellar inferior lateral approach  People cooling spray was utilized  Patient tolerated the procedure well  Postprocedure care and red flags discussed

## 2017-07-17 ENCOUNTER — OFFICE VISIT (OUTPATIENT)
Dept: MEDICAL GROUP | Facility: CLINIC | Age: 52
End: 2017-07-17
Payer: COMMERCIAL

## 2017-07-17 VITALS
HEART RATE: 98 BPM | WEIGHT: 271 LBS | BODY MASS INDEX: 42.53 KG/M2 | RESPIRATION RATE: 18 BRPM | HEIGHT: 67 IN | SYSTOLIC BLOOD PRESSURE: 122 MMHG | OXYGEN SATURATION: 93 % | TEMPERATURE: 98.6 F | DIASTOLIC BLOOD PRESSURE: 78 MMHG

## 2017-07-17 DIAGNOSIS — M17.12 ARTHRITIS OF KNEE, LEFT: ICD-10-CM

## 2017-07-17 PROCEDURE — 99213 OFFICE O/P EST LOW 20 MIN: CPT | Performed by: FAMILY MEDICINE

## 2017-07-17 ASSESSMENT — PATIENT HEALTH QUESTIONNAIRE - PHQ9: CLINICAL INTERPRETATION OF PHQ2 SCORE: 0

## 2017-07-17 NOTE — MR AVS SNAPSHOT
"        Mingo Christiansen   2017 4:30 PM   Office Visit   MRN: [id number]    Department:  [facility]   Dept Phone:  [phone]    Description:  Male : 1965   Provider:  Raphael Kaur M.D.           Reason for Visit     Follow-Up F/U post injection check up       Allergies as of 2017     Allergen Noted Reactions    Erythromycin 02/15/2016   Unspecified    Pt states \"Years ago he had a high temp\".      You were diagnosed with     Arthritis of knee, left   [794790]         Vital Signs     Blood Pressure Pulse Temperature Respirations Height Weight    122/78 mmHg 98 37 °C (98.6 °F) 18 1.702 m (5' 7\") 122.925 kg (271 lb)    Body Mass Index Oxygen Saturation Smoking Status             42.43 kg/m2 93% Never Smoker          Basic Information     Date Of Birth Sex Race Ethnicity Preferred Language    1965 Male White Non- English      Your appointments     Aug 17, 2017  4:30 PM   Established Patient with Raphael Kaur M.D.   [facility]    [address] [facility] [address]   [phone]           You will be receiving a confirmation call a few days before your appointment from our automated call confirmation system.              Problem List              ICD-10-CM Priority Class Noted - Resolved    Atypical chest pain R07.89   2/15/2016 - Present      Health Maintenance        Date Due Completion Dates    IMM DTaP/Tdap/Td Vaccine (1 - Tdap) 1983 ---    COLONOSCOPY 2014 ---    IMM INFLUENZA (1) 2017 ---            Current Immunizations     No immunizations on file.      Below and/or attached are the medications your provider expects you to take. Review all of your home medications and newly ordered medications with your provider and/or pharmacist. Follow medication instructions as directed by your provider and/or pharmacist. Please keep your medication list with you and share with your provider. Update the information when " medications are discontinued, doses are changed, or new medications (including over-the-counter products) are added; and carry medication information at all times in the event of emergency situations     Allergies:  ERYTHROMYCIN - Unspecified               Medications  Valid as of: July 17, 2017 -  5:11 PM    Generic Name Brand Name Tablet Size Instructions for use    Albuterol Sulfate (Aero Soln) albuterol 108 (90 BASE) MCG/ACT Inhale 2 Puffs by mouth every 6 hours as needed for Shortness of Breath.        Allopurinol (Tab) ZYLOPRIM 300 MG Take 300 mg by mouth every day.        Atenolol (Tab) TENORMIN 25 MG Take 0.5 Tabs by mouth every day. Do not take if Systolic Blood Pressure <110, or Heart Rate <60        Cephalexin (Cap) KEFLEX 500 MG         Coenzyme Q10   Take  by mouth.        Fluticasone-Salmeterol (Aerosol) ADVAIR -21 MCG/ACT Inhale 2 Puffs by mouth 2 times a day.        Hydrocodone-Acetaminophen (Tab) NORCO 5-325 MG         Lisinopril (Tab) PRINIVIL 10 MG Take 0.5 Tabs by mouth every day.        Multiple Vitamins-Minerals (Tab) THERAGRAN-M  Take 2 Tabs by mouth every day.        Mupirocin (Ointment) BACTROBAN 2 %         Naproxen (Tab) NAPROSYN 500 MG Take 1 Tab by mouth 2 times a day, with meals.        Naproxen Sodium (Cap) Naproxen Sodium 220 MG Take  by mouth.        TraMADol HCl (Tab) ULTRAM 50 MG Take 1 Tab by mouth every four hours as needed.        .                 Medicines prescribed today were sent to:     [facility] [address] [place] [address] (), [place] - [address] () [place] [address]    Phone: [phone] Fax: [phone]    Open 24 Hours?: No      Medication refill instructions:       If your prescription bottle indicates you have medication refills left, it is not necessary to call your provider’s office. Please contact your pharmacy and they will refill your medication.    If your prescription bottle indicates you do not have any refills left, you may  request refills at any time through one of the following ways: The online Fwd: [employer] system (except Urgent Care), by calling your provider’s office, or by asking your pharmacy to contact your provider’s office with a refill request. Medication refills are processed only during regular business hours and may not be available until the next business day. Your provider may request additional information or to have a follow-up visit with you prior to refilling your medication.   *Please Note: Medication refills are assigned a new Rx number when refilled electronically. Your pharmacy may indicate that no refills were authorized even though a new prescription for the same medication is available at the pharmacy. Please request the medicine by name with the pharmacy before contacting your provider for a refill.        Referral     A referral request has been sent to our patient care coordination department. Please allow 3-5 business days for us to process this request and contact you either by phone or mail. If you do not hear from us by the 5th business day, please call us at [phone].        Instructions    Wear and Tear Disorders of the Knee (Arthritis, Osteoarthritis)  Everyone will experience wear and tear injuries (arthritis, osteoarthritis) of the knee. These are the changes we all get as we age. They come from the joint stress of daily living. The amount of cartilage damage in your knee and your symptoms determine if you need surgery. Mild problems require approximately two months recovery time. More severe problems take several months to recover. With mild problems, your surgeon may find worn and rough cartilage surfaces. With severe changes, your surgeon may find cartilage that has completely worn away and exposed the bone. Loose bodies of bone and cartilage, bone spurs (excess bone growth), and injuries to the menisci (cushions between the large bones of your leg) are also common. All of these problems can  cause pain.  For a mild wear and tear problem, rough cartilage may simply need to be shaved and smoothed. For more severe problems with areas of exposed bone, your surgeon may use an instrument for roughing up the bone surfaces to stimulate new cartilage growth. Loose bodies are usually removed. Torn menisci may be trimmed or repaired.  ABOUT THE ARTHROSCOPIC PROCEDURE  Arthroscopy is a surgical technique. It allows your orthopedic surgeon to diagnose and treat your knee injury with accuracy. The surgeon looks into your knee through a small scope. The scope is like a small (pencil-sized) telescope. Arthroscopy is less invasive than open knee surgery. You can expect a more rapid recovery. After the procedure, you will be moved to a recovery area until most of the effects of the medication have worn off. Your caregiver will discuss the test results with you.  RECOVERY  The severity of the arthritis and the type of procedure performed will determine recovery time. Other important factors include age, physical condition, medical conditions, and the type of rehabilitation program. Strengthening your muscles after arthroscopy helps guarantee a better recovery. Follow your caregiver's instructions. Use crutches, rest, elevate, ice, and do knee exercises as instructed. Your caregivers will help you and instruct you with exercises and other physical therapy required to regain your mobility, muscle strength, and functioning following surgery. Only take over-the-counter or prescription medicines for pain, discomfort, or fever as directed by your caregiver.   SEEK MEDICAL CARE IF:   · There is increased bleeding (more than a small spot) from the wound.  · You notice redness, swelling, or increasing pain in the wound.  · Pus is coming from wound.  · You develop an unexplained oral temperature above 102° F (38.9° C) , or as your caregiver suggests.  · You notice a foul smell coming from the wound or dressing.  · You have severe  pain with motion of the knee.  SEEK IMMEDIATE MEDICAL CARE IF:   · You develop a rash.  · You have difficulty breathing.  · You have any allergic problems.  MAKE SURE YOU:   · Understand these instructions.  · Will watch your condition.  · Will get help right away if you are not doing well or get worse.  Document Released: 12/15/2001 Document Revised: 03/11/2013 Document Reviewed: 05/13/2009  ExitCare® Patient Information ©2014 [employer].                  SurIDx Access Code: [email or web address]  Expires: 8/16/2017  5:11 PM    SurIDx  A secure, online tool to manage your health information     [employer]’s SurIDx® is a secure, online tool that connects you to your personalized health information from the privacy of your home -- day or night - making it very easy for you to manage your healthcare. Once the activation process is completed, you can even access your medical information using the SurIDx adriano, which is available for free in the Apple Adriano store or Google Play store.     SurIDx provides the following levels of access (as shown below):   My Chart Features   [facility] Doctor [facility]  Urgent  Care Non-[facility]  Primary Care  Doctor   Email your healthcare team securely and privately 24/7 X X X    Manage appointments: schedule your next appointment; view details of past/upcoming appointments X      Request prescription refills. X      View recent personal medical records, including lab and immunizations X X X X   View health record, including health history, allergies, medications X X X X   Read reports about your outpatient visits, procedures, consult and ER notes X X X X   See your discharge summary, which is a recap of your hospital and/or ER visit that includes your diagnosis, lab results, and care plan. X X       How to register for SurIDx:  1. Go to  [email or web address].  2. Click on the Sign Up Now box, which takes you to the New Member Sign Up page. You will  need to provide the following information:  a. Enter your Excep Apps Access Code exactly as it appears at the top of this page. (You will not need to use this code after you’ve completed the sign-up process. If you do not sign up before the expiration date, you must request a new code.)   b. Enter your date of birth.   c. Enter your home email address.   d. Click Submit, and follow the next screen’s instructions.  3. Create a Hamilton Thornet ID. This will be your Excep Apps login ID and cannot be changed, so think of one that is secure and easy to remember.  4. Create a Excep Apps password. You can change your password at any time.  5. Enter your Password Reset Question and Answer. This can be used at a later time if you forget your password.   6. Enter your e-mail address. This allows you to receive e-mail notifications when new information is available in Excep Apps.  7. Click Sign Up. You can now view your health information.    For assistance activating your Excep Apps account, call [phone]

## 2017-07-17 NOTE — PATIENT INSTRUCTIONS
Wear and Tear Disorders of the Knee (Arthritis, Osteoarthritis)  Everyone will experience wear and tear injuries (arthritis, osteoarthritis) of the knee. These are the changes we all get as we age. They come from the joint stress of daily living. The amount of cartilage damage in your knee and your symptoms determine if you need surgery. Mild problems require approximately two months recovery time. More severe problems take several months to recover. With mild problems, your surgeon may find worn and rough cartilage surfaces. With severe changes, your surgeon may find cartilage that has completely worn away and exposed the bone. Loose bodies of bone and cartilage, bone spurs (excess bone growth), and injuries to the menisci (cushions between the large bones of your leg) are also common. All of these problems can cause pain.  For a mild wear and tear problem, rough cartilage may simply need to be shaved and smoothed. For more severe problems with areas of exposed bone, your surgeon may use an instrument for roughing up the bone surfaces to stimulate new cartilage growth. Loose bodies are usually removed. Torn menisci may be trimmed or repaired.  ABOUT THE ARTHROSCOPIC PROCEDURE  Arthroscopy is a surgical technique. It allows your orthopedic surgeon to diagnose and treat your knee injury with accuracy. The surgeon looks into your knee through a small scope. The scope is like a small (pencil-sized) telescope. Arthroscopy is less invasive than open knee surgery. You can expect a more rapid recovery. After the procedure, you will be moved to a recovery area until most of the effects of the medication have worn off. Your caregiver will discuss the test results with you.  RECOVERY  The severity of the arthritis and the type of procedure performed will determine recovery time. Other important factors include age, physical condition, medical conditions, and the type of rehabilitation program. Strengthening your muscles after  arthroscopy helps guarantee a better recovery. Follow your caregiver's instructions. Use crutches, rest, elevate, ice, and do knee exercises as instructed. Your caregivers will help you and instruct you with exercises and other physical therapy required to regain your mobility, muscle strength, and functioning following surgery. Only take over-the-counter or prescription medicines for pain, discomfort, or fever as directed by your caregiver.   SEEK MEDICAL CARE IF:   · There is increased bleeding (more than a small spot) from the wound.  · You notice redness, swelling, or increasing pain in the wound.  · Pus is coming from wound.  · You develop an unexplained oral temperature above 102° F (38.9° C) , or as your caregiver suggests.  · You notice a foul smell coming from the wound or dressing.  · You have severe pain with motion of the knee.  SEEK IMMEDIATE MEDICAL CARE IF:   · You develop a rash.  · You have difficulty breathing.  · You have any allergic problems.  MAKE SURE YOU:   · Understand these instructions.  · Will watch your condition.  · Will get help right away if you are not doing well or get worse.  Document Released: 12/15/2001 Document Revised: 03/11/2013 Document Reviewed: 05/13/2009  [employer]® Patient Information ©2014 [employer].

## 2017-07-17 NOTE — PROGRESS NOTES
"Subjective:      Mingo Christiansen is a 51 y.o. male who presents with Follow-Up            HPI    ROS       Objective:     /78 mmHg  Pulse 98  Temp(Src) 37 °C (98.6 °F)  Resp 18  Ht 1.702 m (5' 7\")  Wt 122.925 kg (271 lb)  BMI 42.43 kg/m2  SpO2 93%     Physical Exam            Assessment/Plan:     There are no diagnoses linked to this encounter.      "

## 2017-07-18 NOTE — PROGRESS NOTES
Subjective:      Mingo Christiansen is a 51 y.o. male who presents with Follow-Up      LEFT KNEE PAIN, s/p corticosteroid injection    Knee Pain      CHIEF COMPLAINT:    Mingo Christiansen is here for follow-up for his knee pain  Pain is at the medial  Knee, now has some pain free periods, can have pain up to 5/10 with increased activity which she could not tolerate prior to corticosteroid injection   Quality is aching, sharp  Pain is non-radiating   Improved with rest  Aggravated by after sitting for long periods of time  previous knee injury LEFT knee dislocation with ORIF and hardware  Prior Treatments: Physical Therapy several yrs ago (22 yrs)  Prior studies: X-Ray, at [facility]  Medications tried for pain include: naproxen (OTC), from [facility] with mild improvement  Mechanical Symptom history: No Locking and Stiffness    PAST MEDICAL HISTORY:   History reviewed. No pertinent past medical history.    PMH:  has a past medical history of Hypertension; Asthma; and Gout.  MEDS:   Current outpatient prescriptions:   •  cephALEXin (KEFLEX) 500 MG Cap, , Disp: , Rfl:   •  hydrocodone-acetaminophen (NORCO) 5-325 MG Tab per tablet, , Disp: , Rfl:   •  mupirocin (BACTROBAN) 2 % Ointment, , Disp: , Rfl:   •  Coenzyme Q10 (CO Q 10 PO), Take  by mouth., Disp: , Rfl:   •  naproxen (NAPROSYN) 500 MG Tab, Take 1 Tab by mouth 2 times a day, with meals., Disp: 60 Tab, Rfl: 0  •  tramadol (ULTRAM) 50 MG Tab, Take 1 Tab by mouth every four hours as needed., Disp: 20 Tab, Rfl: 0  •  Naproxen Sodium (ALEVE) 220 MG Cap, Take  by mouth., Disp: , Rfl:   •  lisinopril (PRINIVIL) 10 MG Tab, Take 0.5 Tabs by mouth every day., Disp: 1 Tab, Rfl: 0  •  atenolol (TENORMIN) 25 MG Tab, Take 0.5 Tabs by mouth every day. Do not take if Systolic Blood Pressure <110, or Heart Rate <60, Disp: 1 Tab, Rfl: 0  •  allopurinol (ZYLOPRIM) 300 MG Tab, Take 300 mg by mouth every day., Disp: , Rfl:   •  albuterol (VENTOLIN OR PROVENTIL) 108 (90 BASE) MCG/ACT  "Aero Soln inhalation aerosol, Inhale 2 Puffs by mouth every 6 hours as needed for Shortness of Breath., Disp: , Rfl:   •  fluticasone-salmeterol (ADVAIR HFA) 115-21 MCG/ACT inhaler, Inhale 2 Puffs by mouth 2 times a day., Disp: , Rfl:   •  therapeutic multivitamin-minerals (THERAGRAN-M) Tab, Take 2 Tabs by mouth every day., Disp: , Rfl:   ALLERGIES:   Allergies   Allergen Reactions   • Erythromycin Unspecified     Pt states \"Years ago he had a high temp\".     SURGHX:   Past Surgical History   Procedure Laterality Date   • Other orthopedic surgery  1995     L Knee     SOCHX:  reports that he has never smoked. He has never used smokeless tobacco. He reports that he drinks alcohol. He reports that he does not use illicit drugs.  FH: Family history was reviewed, no pertinent findings to report         PHYSICAL EXAM:  /78 mmHg  Pulse 98  Temp(Src) 37 °C (98.6 °F)  Resp 18  Ht 1.702 m (5' 7\")  Wt 122.925 kg (271 lb)  BMI 42.43 kg/m2  SpO2 93%     obese in no apparent distress, alert and oriented x 3.  Gait: antalgic       1. Arthritis of knee, left  REFERRAL TO PHYSICAL THERAPY Reason for Therapy: Eval/Treat/Report     Admitted for functional knee brace in the office today  Referral for physical therapy placed                                      Results for orders placed in visit on 06/15/17   DX-KNEES-AP BILATERAL STANDING    Impression 1.  No acute fracture or dislocation.    2.  Moderate osteoarthritis and postoperative changes in the left knee.    3.  Mild osteoarthritis of the right knee.      Results for orders placed in visit on 06/15/17   DX-KNEE COMPLETE 4+ LEFT    Impression No evidence of fracture or dislocation.  There are degenerative changes and postoperative changes as described above.                                            Thank you AMARA Licea for allowing me to participate in caring for your patient.  "

## 2017-07-26 ENCOUNTER — HOSPITAL ENCOUNTER (OUTPATIENT)
Dept: PHYSICAL THERAPY | Facility: REHABILITATION | Age: 52
End: 2017-07-26
Attending: FAMILY MEDICINE
Payer: COMMERCIAL

## 2017-07-26 PROCEDURE — 97110 THERAPEUTIC EXERCISES: CPT

## 2017-07-26 PROCEDURE — 97161 PT EVAL LOW COMPLEX 20 MIN: CPT

## 2017-08-08 ENCOUNTER — APPOINTMENT (OUTPATIENT)
Dept: PHYSICAL THERAPY | Facility: REHABILITATION | Age: 52
End: 2017-08-08
Attending: FAMILY MEDICINE
Payer: COMMERCIAL

## 2017-08-10 ENCOUNTER — APPOINTMENT (OUTPATIENT)
Dept: PHYSICAL THERAPY | Facility: REHABILITATION | Age: 52
End: 2017-08-10
Attending: FAMILY MEDICINE
Payer: COMMERCIAL

## 2017-08-15 ENCOUNTER — APPOINTMENT (OUTPATIENT)
Dept: PHYSICAL THERAPY | Facility: REHABILITATION | Age: 52
End: 2017-08-15
Attending: FAMILY MEDICINE
Payer: COMMERCIAL

## 2017-08-17 ENCOUNTER — APPOINTMENT (OUTPATIENT)
Dept: PHYSICAL THERAPY | Facility: REHABILITATION | Age: 52
End: 2017-08-17
Attending: FAMILY MEDICINE
Payer: COMMERCIAL

## 2017-08-22 ENCOUNTER — APPOINTMENT (OUTPATIENT)
Dept: PHYSICAL THERAPY | Facility: REHABILITATION | Age: 52
End: 2017-08-22
Attending: FAMILY MEDICINE
Payer: COMMERCIAL

## 2017-08-24 ENCOUNTER — APPOINTMENT (OUTPATIENT)
Dept: PHYSICAL THERAPY | Facility: REHABILITATION | Age: 52
End: 2017-08-24
Attending: FAMILY MEDICINE
Payer: COMMERCIAL

## 2017-10-26 ENCOUNTER — TELEPHONE (OUTPATIENT)
Dept: CARDIOLOGY | Facility: MEDICAL CENTER | Age: 52
End: 2017-10-26

## 2017-11-01 ENCOUNTER — TELEPHONE (OUTPATIENT)
Dept: CARDIOLOGY | Facility: MEDICAL CENTER | Age: 52
End: 2017-11-01

## 2017-11-20 ENCOUNTER — OFFICE VISIT (OUTPATIENT)
Dept: CARDIOLOGY | Facility: MEDICAL CENTER | Age: 52
End: 2017-11-20
Payer: COMMERCIAL

## 2017-11-20 VITALS
OXYGEN SATURATION: 92 % | WEIGHT: 273.6 LBS | HEART RATE: 102 BPM | SYSTOLIC BLOOD PRESSURE: 122 MMHG | BODY MASS INDEX: 42.94 KG/M2 | HEIGHT: 67 IN | DIASTOLIC BLOOD PRESSURE: 74 MMHG

## 2017-11-20 DIAGNOSIS — R00.0 TACHYCARDIA: ICD-10-CM

## 2017-11-20 DIAGNOSIS — R06.83 SNORING: ICD-10-CM

## 2017-11-20 DIAGNOSIS — I10 ESSENTIAL HYPERTENSION: ICD-10-CM

## 2017-11-20 LAB — EKG IMPRESSION: NORMAL

## 2017-11-20 PROCEDURE — 99204 OFFICE O/P NEW MOD 45 MIN: CPT | Performed by: INTERNAL MEDICINE

## 2017-11-20 PROCEDURE — 93000 ELECTROCARDIOGRAM COMPLETE: CPT | Performed by: INTERNAL MEDICINE

## 2017-11-20 RX ORDER — TACROLIMUS 1 MG/G
OINTMENT TOPICAL
COMMUNITY
Start: 2017-10-12 | End: 2020-01-13

## 2017-11-20 RX ORDER — KETOCONAZOLE 20 MG/G
CREAM TOPICAL
COMMUNITY
Start: 2017-10-12 | End: 2020-01-13

## 2017-11-20 RX ORDER — LISINOPRIL 5 MG/1
TABLET ORAL
COMMUNITY
Start: 2017-10-13 | End: 2018-01-04

## 2017-11-20 RX ORDER — FLUTICASONE PROPIONATE 0.05 %
CREAM (GRAM) TOPICAL
COMMUNITY
Start: 2017-10-16 | End: 2018-10-18

## 2017-11-20 ASSESSMENT — ENCOUNTER SYMPTOMS
DIZZINESS: 1
PND: 0
DEPRESSION: 0
SHORTNESS OF BREATH: 0
PALPITATIONS: 1
BRUISES/BLEEDS EASILY: 0
ABDOMINAL PAIN: 0
LOSS OF CONSCIOUSNESS: 0
FALLS: 0
ORTHOPNEA: 0

## 2017-11-20 NOTE — LETTER
"     [facility]   [address] [place] [facility] [address]  [place] [address]  Phone: [phone]  Fax: [phone]              Mingo Christiansen  1965    Encounter Date: 11/20/2017    Madeline Iraheta M.D.          PROGRESS NOTE:  Subjective:   Mingo Christiansen is a 52 y.o. male with past medical history significant for hypertension and asthma who was referred to cardiology clinic given concern for his tachycardia. Patient reports that his heart rate is usually in the 90s and sometimes associated with palpitations especially when he is anxious. He does not drink caffeine on a regular basis that some days he'll have up to 5 cups a day.    Occasionally he reports dizziness which she describes as a lack of balance. His symptoms usually improve after he starts using his Advair for his asthma. He denies any associated dyspnea. No chest discomfort.    His blood pressure is usually well controlled like it is today.    He used to be on atenolol which she was tolerating well. Unfortunately after atenolol was no longer available, he was switched to metoprolol. Since being switched to metoprolol he reports feeling a lot of fatigue.    His father reports that he snores at night. He wakes up extremely tired in the morning.    Past Medical History:   Diagnosis Date   • Asthma    • Gout    • Hypertension      Past Surgical History:   Procedure Laterality Date   • OTHER ORTHOPEDIC SURGERY  1995    L Knee     History reviewed. No pertinent family history.   Patient is adopted and does not know any of his family history.    History   Smoking Status   • Never Smoker   Smokeless Tobacco   • Never Used     Does not drink alcohol on a regular basis but does occasionally binge drink.  No recreational drug use at this time. Used to smoke marijuana 3-4 years ago.    Allergies   Allergen Reactions   • Erythromycin Unspecified     Pt states \"Years ago he had a high temp\".     Outpatient Encounter Prescriptions as of " 11/20/2017   Medication Sig Dispense Refill   • fluticasone (CUTIVATE) 0.05 % cream      • ketoconazole (NIZORAL) 2 % Cream      • lisinopril (PRINIVIL) 5 MG Tab      • tacrolimus (PROTOPIC) 0.1 % Ointment      • mupirocin (BACTROBAN) 2 % Ointment      • Coenzyme Q10 (CO Q 10 PO) Take  by mouth.     • naproxen (NAPROSYN) 500 MG Tab Take 1 Tab by mouth 2 times a day, with meals. 60 Tab 0   • allopurinol (ZYLOPRIM) 300 MG Tab Take 300 mg by mouth every day.     • albuterol (VENTOLIN OR PROVENTIL) 108 (90 BASE) MCG/ACT Aero Soln inhalation aerosol Inhale 2 Puffs by mouth every 6 hours as needed for Shortness of Breath.     • therapeutic multivitamin-minerals (THERAGRAN-M) Tab Take 2 Tabs by mouth every day.     • [DISCONTINUED] metoprolol (LOPRESSOR) 25 MG Tab Take 12.5 mg by mouth 2 times a day.     • [DISCONTINUED] cephALEXin (KEFLEX) 500 MG Cap      • [DISCONTINUED] hydrocodone-acetaminophen (NORCO) 5-325 MG Tab per tablet      • [DISCONTINUED] tramadol (ULTRAM) 50 MG Tab Take 1 Tab by mouth every four hours as needed. 20 Tab 0   • [DISCONTINUED] Naproxen Sodium (ALEVE) 220 MG Cap Take  by mouth.     • [DISCONTINUED] lisinopril (PRINIVIL) 10 MG Tab Take 0.5 Tabs by mouth every day. 1 Tab 0   • [DISCONTINUED] atenolol (TENORMIN) 25 MG Tab Take 0.5 Tabs by mouth every day. Do not take if Systolic Blood Pressure <110, or Heart Rate <60 1 Tab 0   • fluticasone-salmeterol (ADVAIR HFA) 115-21 MCG/ACT inhaler Inhale 2 Puffs by mouth 2 times a day.       No facility-administered encounter medications on file as of 11/20/2017.      Review of Systems   Constitutional: Negative for malaise/fatigue.   HENT: Negative.    Respiratory: Negative for shortness of breath.    Cardiovascular: Positive for palpitations. Negative for chest pain, orthopnea, leg swelling and PND.   Gastrointestinal: Negative for abdominal pain.   Musculoskeletal: Negative for falls.   Skin: Negative.    Neurological: Positive for dizziness. Negative for  "loss of consciousness.   Endo/Heme/Allergies: Does not bruise/bleed easily.   Psychiatric/Behavioral: Negative for depression.   All other systems reviewed and are negative.       Objective:   /74   Pulse (!) 102   Ht 1.702 m (5' 7\")   Wt 124.1 kg (273 lb 9.6 oz)   SpO2 92%   BMI 42.85 kg/m²      Physical Exam   Constitutional: He is oriented to person, place, and time. No distress.   HENT:   Head: Normocephalic and atraumatic.   Eyes: Conjunctivae are normal.   Neck: Normal range of motion. Neck supple. No JVD present.   Cardiovascular: Normal rate, regular rhythm and normal heart sounds.  Exam reveals no gallop and no friction rub.    No murmur heard.  Pulmonary/Chest: Effort normal and breath sounds normal. No respiratory distress. He has no wheezes. He has no rales.   Abdominal: Soft. There is no tenderness.   Musculoskeletal: He exhibits no edema.   Neurological: He is alert and oriented to person, place, and time.   Skin: Skin is warm and dry. He is not diaphoretic.   Psychiatric: He has a normal mood and affect.   Nursing note and vitals reviewed.    EKG from today was reviewed and shows normal sinus rhythm at 87 bpm, normal EKG.    Assessment:     1. Essential hypertension  EKG    BASIC METABOLIC PANEL    LIPID PROFILE   2. Tachycardia  HOLTER MONITOR STUDY    CBC WITHOUT DIFFERENTIAL    THYROID PANEL WITH TSH   3. Snoring  LIPID PROFILE    REFERRAL TO SLEEP STUDIES       Medical Decision Making:  Today's Assessment / Status / Plan:     Tachycardia: It appears the patient likely has sinus tachycardia, due to his overall lack of exercise. I will refer him for a 48 hour Holter monitor for further evaluation.   I will also check a Chem-7 and a CBC and a thyroid panel for evaluation.  I do not see any clear reason for use of metoprolol at this time. I will discontinue it for now pending the above workup.     Hypertension: His blood pressure is well controlled today. Continue lisinopril at current " dose.    Concern for obstructive sleep apnea: Given patient's symptoms as noted above. I will refer him for a sleep study.    Check fasting lipid panel.    Return to clinic in 1 month or earlier if needed.    Thank you for allowing me to participate in the care of this patient. Please do not hesitate to contact me with any questions.    Madeline Iraheta MD  Cardiologist  [facility]      PLEASE NOTE: This dictation was created using voice recognition software.         Yaquelin Sesay D.O.  [address]  [place] [address]  VIA Facsimile: [phone]

## 2017-11-20 NOTE — PROGRESS NOTES
"Subjective:   Mingo Christiansen is a 52 y.o. male with past medical history significant for hypertension and asthma who was referred to cardiology clinic given concern for his tachycardia. Patient reports that his heart rate is usually in the 90s and sometimes associated with palpitations especially when he is anxious. He does not drink caffeine on a regular basis that some days he'll have up to 5 cups a day.    Occasionally he reports dizziness which she describes as a lack of balance. His symptoms usually improve after he starts using his Advair for his asthma. He denies any associated dyspnea. No chest discomfort.    His blood pressure is usually well controlled like it is today.    He used to be on atenolol which she was tolerating well. Unfortunately after atenolol was no longer available, he was switched to metoprolol. Since being switched to metoprolol he reports feeling a lot of fatigue.    His father reports that he snores at night. He wakes up extremely tired in the morning.    Past Medical History:   Diagnosis Date   • Asthma    • Gout    • Hypertension      Past Surgical History:   Procedure Laterality Date   • OTHER ORTHOPEDIC SURGERY  1995    L Knee     History reviewed. No pertinent family history.   Patient is adopted and does not know any of his family history.    History   Smoking Status   • Never Smoker   Smokeless Tobacco   • Never Used     Does not drink alcohol on a regular basis but does occasionally binge drink.  No recreational drug use at this time. Used to smoke marijuana 3-4 years ago.    Allergies   Allergen Reactions   • Erythromycin Unspecified     Pt states \"Years ago he had a high temp\".     Outpatient Encounter Prescriptions as of 11/20/2017   Medication Sig Dispense Refill   • fluticasone (CUTIVATE) 0.05 % cream      • ketoconazole (NIZORAL) 2 % Cream      • lisinopril (PRINIVIL) 5 MG Tab      • tacrolimus (PROTOPIC) 0.1 % Ointment      • mupirocin (BACTROBAN) 2 % Ointment      • " "Coenzyme Q10 (CO Q 10 PO) Take  by mouth.     • naproxen (NAPROSYN) 500 MG Tab Take 1 Tab by mouth 2 times a day, with meals. 60 Tab 0   • allopurinol (ZYLOPRIM) 300 MG Tab Take 300 mg by mouth every day.     • albuterol (VENTOLIN OR PROVENTIL) 108 (90 BASE) MCG/ACT Aero Soln inhalation aerosol Inhale 2 Puffs by mouth every 6 hours as needed for Shortness of Breath.     • therapeutic multivitamin-minerals (THERAGRAN-M) Tab Take 2 Tabs by mouth every day.     • [DISCONTINUED] metoprolol (LOPRESSOR) 25 MG Tab Take 12.5 mg by mouth 2 times a day.     • [DISCONTINUED] cephALEXin (KEFLEX) 500 MG Cap      • [DISCONTINUED] hydrocodone-acetaminophen (NORCO) 5-325 MG Tab per tablet      • [DISCONTINUED] tramadol (ULTRAM) 50 MG Tab Take 1 Tab by mouth every four hours as needed. 20 Tab 0   • [DISCONTINUED] Naproxen Sodium (ALEVE) 220 MG Cap Take  by mouth.     • [DISCONTINUED] lisinopril (PRINIVIL) 10 MG Tab Take 0.5 Tabs by mouth every day. 1 Tab 0   • [DISCONTINUED] atenolol (TENORMIN) 25 MG Tab Take 0.5 Tabs by mouth every day. Do not take if Systolic Blood Pressure <110, or Heart Rate <60 1 Tab 0   • fluticasone-salmeterol (ADVAIR HFA) 115-21 MCG/ACT inhaler Inhale 2 Puffs by mouth 2 times a day.       No facility-administered encounter medications on file as of 11/20/2017.      Review of Systems   Constitutional: Negative for malaise/fatigue.   HENT: Negative.    Respiratory: Negative for shortness of breath.    Cardiovascular: Positive for palpitations. Negative for chest pain, orthopnea, leg swelling and PND.   Gastrointestinal: Negative for abdominal pain.   Musculoskeletal: Negative for falls.   Skin: Negative.    Neurological: Positive for dizziness. Negative for loss of consciousness.   Endo/Heme/Allergies: Does not bruise/bleed easily.   Psychiatric/Behavioral: Negative for depression.   All other systems reviewed and are negative.       Objective:   /74   Pulse (!) 102   Ht 1.702 m (5' 7\")   Wt 124.1 kg " (273 lb 9.6 oz)   SpO2 92%   BMI 42.85 kg/m²     Physical Exam   Constitutional: He is oriented to person, place, and time. No distress.   HENT:   Head: Normocephalic and atraumatic.   Eyes: Conjunctivae are normal.   Neck: Normal range of motion. Neck supple. No JVD present.   Cardiovascular: Normal rate, regular rhythm and normal heart sounds.  Exam reveals no gallop and no friction rub.    No murmur heard.  Pulmonary/Chest: Effort normal and breath sounds normal. No respiratory distress. He has no wheezes. He has no rales.   Abdominal: Soft. There is no tenderness.   Musculoskeletal: He exhibits no edema.   Neurological: He is alert and oriented to person, place, and time.   Skin: Skin is warm and dry. He is not diaphoretic.   Psychiatric: He has a normal mood and affect.   Nursing note and vitals reviewed.    EKG from today was reviewed and shows normal sinus rhythm at 87 bpm, normal EKG.    Assessment:     1. Essential hypertension  EKG    BASIC METABOLIC PANEL    LIPID PROFILE   2. Tachycardia  HOLTER MONITOR STUDY    CBC WITHOUT DIFFERENTIAL    THYROID PANEL WITH TSH   3. Snoring  LIPID PROFILE    REFERRAL TO SLEEP STUDIES       Medical Decision Making:  Today's Assessment / Status / Plan:     Tachycardia: It appears the patient likely has sinus tachycardia, due to his overall lack of exercise. I will refer him for a 48 hour Holter monitor for further evaluation.   I will also check a Chem-7 and a CBC and a thyroid panel for evaluation.  I do not see any clear reason for use of metoprolol at this time. I will discontinue it for now pending the above workup.     Hypertension: His blood pressure is well controlled today. Continue lisinopril at current dose.    Concern for obstructive sleep apnea: Given patient's symptoms as noted above. I will refer him for a sleep study.    Check fasting lipid panel.    Return to clinic in 1 month or earlier if needed.    Thank you for allowing me to participate in the care  of this patient. Please do not hesitate to contact me with any questions.    Madeline Iraheta MD  Cardiologist  [facility]      PLEASE NOTE: This dictation was created using voice recognition software.

## 2017-11-21 ENCOUNTER — TELEPHONE (OUTPATIENT)
Dept: CARDIOLOGY | Facility: MEDICAL CENTER | Age: 52
End: 2017-11-21

## 2017-11-21 NOTE — TELEPHONE ENCOUNTER
Message     The referral has been faxed to   [facility]   [phone] for scheduling     Noted.

## 2017-11-29 ENCOUNTER — HOSPITAL ENCOUNTER (OUTPATIENT)
Dept: LAB | Facility: MEDICAL CENTER | Age: 52
End: 2017-11-29
Attending: FAMILY MEDICINE
Payer: COMMERCIAL

## 2017-11-29 ENCOUNTER — HOSPITAL ENCOUNTER (OUTPATIENT)
Dept: LAB | Facility: MEDICAL CENTER | Age: 52
End: 2017-11-29
Attending: INTERNAL MEDICINE
Payer: COMMERCIAL

## 2017-11-29 DIAGNOSIS — R06.83 SNORING: ICD-10-CM

## 2017-11-29 DIAGNOSIS — I10 ESSENTIAL HYPERTENSION: ICD-10-CM

## 2017-11-29 DIAGNOSIS — R00.0 TACHYCARDIA: ICD-10-CM

## 2017-11-29 LAB
ANION GAP SERPL CALC-SCNC: 9 MMOL/L (ref 0–11.9)
BUN SERPL-MCNC: 16 MG/DL (ref 8–22)
CALCIUM SERPL-MCNC: 9.9 MG/DL (ref 8.5–10.5)
CHLORIDE SERPL-SCNC: 105 MMOL/L (ref 96–112)
CHOLEST SERPL-MCNC: 141 MG/DL (ref 100–199)
CO2 SERPL-SCNC: 27 MMOL/L (ref 20–33)
CREAT SERPL-MCNC: 0.91 MG/DL (ref 0.5–1.4)
ERYTHROCYTE [DISTWIDTH] IN BLOOD BY AUTOMATED COUNT: 46.8 FL (ref 35.9–50)
GFR SERPL CREATININE-BSD FRML MDRD: >60 ML/MIN/1.73 M 2
GLUCOSE SERPL-MCNC: 98 MG/DL (ref 65–99)
HCT VFR BLD AUTO: 46.8 % (ref 42–52)
HDLC SERPL-MCNC: 57 MG/DL
HGB BLD-MCNC: 15.7 G/DL (ref 14–18)
LDLC SERPL CALC-MCNC: 66 MG/DL
MCH RBC QN AUTO: 33.1 PG (ref 27–33)
MCHC RBC AUTO-ENTMCNC: 33.5 G/DL (ref 33.7–35.3)
MCV RBC AUTO: 98.5 FL (ref 81.4–97.8)
PLATELET # BLD AUTO: 249 K/UL (ref 164–446)
PMV BLD AUTO: 9.8 FL (ref 9–12.9)
POTASSIUM SERPL-SCNC: 3.8 MMOL/L (ref 3.6–5.5)
RBC # BLD AUTO: 4.75 M/UL (ref 4.7–6.1)
SODIUM SERPL-SCNC: 141 MMOL/L (ref 135–145)
T4 FREE SERPL-MCNC: 0.79 NG/DL (ref 0.53–1.43)
TRIGL SERPL-MCNC: 92 MG/DL (ref 0–149)
TSH SERPL DL<=0.005 MIU/L-ACNC: 2.06 UIU/ML (ref 0.3–3.7)
URATE SERPL-MCNC: 5.1 MG/DL (ref 2.5–8.3)
WBC # BLD AUTO: 6 K/UL (ref 4.8–10.8)

## 2017-11-29 PROCEDURE — 84439 ASSAY OF FREE THYROXINE: CPT

## 2017-11-29 PROCEDURE — 80061 LIPID PANEL: CPT

## 2017-11-29 PROCEDURE — 80048 BASIC METABOLIC PNL TOTAL CA: CPT

## 2017-11-29 PROCEDURE — 84550 ASSAY OF BLOOD/URIC ACID: CPT

## 2017-11-29 PROCEDURE — 36415 COLL VENOUS BLD VENIPUNCTURE: CPT

## 2017-11-29 PROCEDURE — 84443 ASSAY THYROID STIM HORMONE: CPT

## 2017-11-29 PROCEDURE — 85027 COMPLETE CBC AUTOMATED: CPT

## 2017-12-01 ENCOUNTER — TELEPHONE (OUTPATIENT)
Dept: CARDIOLOGY | Facility: MEDICAL CENTER | Age: 52
End: 2017-12-01

## 2017-12-01 NOTE — TELEPHONE ENCOUNTER
----- Message from Madleine Iraheta M.D. sent at 11/30/2017 10:46 PM PST -----  Labs reviewed. Look good.   No changes for now.   Thank you   AA

## 2017-12-22 ENCOUNTER — NON-PROVIDER VISIT (OUTPATIENT)
Dept: CARDIOLOGY | Facility: MEDICAL CENTER | Age: 52
End: 2017-12-22
Payer: COMMERCIAL

## 2017-12-22 DIAGNOSIS — R00.0 TACHYCARDIA: ICD-10-CM

## 2017-12-27 DIAGNOSIS — R00.0 TACHYCARDIA: ICD-10-CM

## 2017-12-27 LAB — EKG IMPRESSION: NORMAL

## 2017-12-27 PROCEDURE — 93224 XTRNL ECG REC UP TO 48 HRS: CPT | Performed by: INTERNAL MEDICINE

## 2018-01-04 ENCOUNTER — OFFICE VISIT (OUTPATIENT)
Dept: CARDIOLOGY | Facility: MEDICAL CENTER | Age: 53
End: 2018-01-04
Payer: COMMERCIAL

## 2018-01-04 VITALS
BODY MASS INDEX: 42.82 KG/M2 | OXYGEN SATURATION: 94 % | SYSTOLIC BLOOD PRESSURE: 136 MMHG | WEIGHT: 272.8 LBS | HEART RATE: 114 BPM | DIASTOLIC BLOOD PRESSURE: 80 MMHG | HEIGHT: 67 IN

## 2018-01-04 DIAGNOSIS — I10 ESSENTIAL HYPERTENSION: ICD-10-CM

## 2018-01-04 DIAGNOSIS — R00.0 TACHYCARDIA: ICD-10-CM

## 2018-01-04 PROCEDURE — 99214 OFFICE O/P EST MOD 30 MIN: CPT | Performed by: INTERNAL MEDICINE

## 2018-01-04 RX ORDER — CARVEDILOL 6.25 MG/1
6.25 TABLET ORAL 2 TIMES DAILY WITH MEALS
Qty: 60 TAB | Refills: 11 | Status: SHIPPED | OUTPATIENT
Start: 2018-01-04 | End: 2018-12-28 | Stop reason: SDUPTHER

## 2018-01-04 ASSESSMENT — ENCOUNTER SYMPTOMS
PALPITATIONS: 1
ABDOMINAL PAIN: 0
LOSS OF CONSCIOUSNESS: 0
FALLS: 0
ORTHOPNEA: 0
SHORTNESS OF BREATH: 0
BRUISES/BLEEDS EASILY: 0
PND: 0
DEPRESSION: 0
DIZZINESS: 0

## 2018-01-04 NOTE — LETTER
"     [facility]   [address]  [place] [address]  Phone: [phone]  Fax: [phone]              Mingo Christiansen  1965    Encounter Date: 1/4/2018    Madeline Iraheta M.D.          PROGRESS NOTE:  Subjective:   Mingo Christiansen is a 52 y.o. male with past medical history significant for hypertension and asthma who is presenting to clinic for follow-up. At his last visit his metoprolol was discontinued since the patient complained of severe fatigue and dizziness while on it. Since stopping the metoprolol, his dizziness has now essentially resolved.     He continues to be on the lisinopril. His blood pressure has been mostly 130s/70s. His heart rate continues to be elevated most of the time.    He reports severe anxiety both at work and at home. He does not exercise much at all. He was referred to sleep clinic at the last visit but he has not heard back from them yet.    Past Medical History:   Diagnosis Date   • Asthma    • Gout    • Hypertension      Past Surgical History:   Procedure Laterality Date   • OTHER ORTHOPEDIC SURGERY  1995    L Knee     History reviewed. No pertinent family history.   Patient is adopted and does not know any of his family history.    History   Smoking Status   • Never Smoker   Smokeless Tobacco   • Never Used     Does not drink alcohol on a regular basis but does occasionally binge drink.  No recreational drug use at this time. Used to smoke marijuana 3-4 years ago.    Allergies   Allergen Reactions   • Erythromycin Unspecified     Pt states \"Years ago he had a high temp\".     Outpatient Encounter Prescriptions as of 1/4/2018   Medication Sig Dispense Refill   • carvedilol (COREG) 6.25 MG Tab Take 1 Tab by mouth 2 times a day, with meals. 60 Tab 11   • Coenzyme Q10 (CO Q 10 PO) Take  by mouth.     • allopurinol (ZYLOPRIM) 300 MG Tab Take 300 mg by mouth every day.     • therapeutic multivitamin-minerals (THERAGRAN-M) Tab " "Take 2 Tabs by mouth every day.     • fluticasone (CUTIVATE) 0.05 % cream      • ketoconazole (NIZORAL) 2 % Cream      • tacrolimus (PROTOPIC) 0.1 % Ointment      • [DISCONTINUED] lisinopril (PRINIVIL) 5 MG Tab      • mupirocin (BACTROBAN) 2 % Ointment      • naproxen (NAPROSYN) 500 MG Tab Take 1 Tab by mouth 2 times a day, with meals. 60 Tab 0   • albuterol (VENTOLIN OR PROVENTIL) 108 (90 BASE) MCG/ACT Aero Soln inhalation aerosol Inhale 2 Puffs by mouth every 6 hours as needed for Shortness of Breath.     • fluticasone-salmeterol (ADVAIR HFA) 115-21 MCG/ACT inhaler Inhale 2 Puffs by mouth 2 times a day.       No facility-administered encounter medications on file as of 1/4/2018.      Review of Systems   Constitutional: Positive for malaise/fatigue.   HENT: Negative.    Respiratory: Negative for shortness of breath.    Cardiovascular: Positive for palpitations. Negative for chest pain, orthopnea, leg swelling and PND.   Gastrointestinal: Negative for abdominal pain.   Musculoskeletal: Negative for falls.   Skin: Negative.    Neurological: Negative for dizziness and loss of consciousness.   Endo/Heme/Allergies: Does not bruise/bleed easily.   Psychiatric/Behavioral: Negative for depression.   All other systems reviewed and are negative.       Objective:   /80   Pulse (!) 114   Ht 1.702 m (5' 7\")   Wt 123.7 kg (272 lb 12.8 oz)   SpO2 94%   BMI 42.73 kg/m²      Physical Exam   Constitutional: He is oriented to person, place, and time. No distress.   HENT:   Head: Normocephalic and atraumatic.   Eyes: Conjunctivae are normal.   Neck: Normal range of motion. Neck supple. No JVD present.   Cardiovascular: Normal rate, regular rhythm and normal heart sounds.  Exam reveals no gallop and no friction rub.    No murmur heard.  Pulmonary/Chest: Effort normal and breath sounds normal. No respiratory distress. He has no wheezes. He has no rales.   Abdominal: Soft. There is no tenderness.   Musculoskeletal: He exhibits " no edema.   Neurological: He is alert and oriented to person, place, and time.   Skin: Skin is warm and dry. He is not diaphoretic.   Psychiatric: He has a normal mood and affect.   Nursing note and vitals reviewed.    Echo performed in February 2016 was reviewed and showed  CONCLUSIONS  Normal left ventricular chamber size.  Normal left ventricular wall thickness.  Normal left ventricular systolic function.  Structurally normal aortic and mitral valve without significant stenosis or regurgitation.  Normal pericardium without effusion.    EKG from November 2017 was reviewed and shows normal sinus rhythm at 87 bpm, normal EKG.    Labs from November 2017 were reviewed and showed normal hemoglobin. Normal creatinine. Normal TSH.  LDL 66.    Holter monitor performed in December 2017 was reviewed and showed  Interpretive Statements   *  Monitoring started at 3:20 PM and continued for 48 hours. Cardiac rhythm   is Sinus. The average heart rate was 95 BPM.   The minimum heart rate was 61 BPM, occurring at 7:22:18 AM. The maximum   heart rate was 148 BPM, occurring   at 3:56:29 PM. The longest R-R interval was 1.1 seconds occurring at   9:14:39 AM D2.   *  Ventricular ectopic activity consisted of 27 single PVCs.   *  The patient's rhythm included 16 hours four minutes 25 seconds of sinus   tachycardia. The fastest single episode occurred   at 3:41:41 PM D1, lasting 21 minutes nine seconds, with maximum heart   rate of 148 BPM.   *  Supraventricular ectopic activity consisted of eight single PACs.   *  Diary entries - no symptoms listed in diary.   Summary:   Normal sinus rhythm/ sinus tachycardia   No sustained arrhythmias noted     Assessment:     1. Tachycardia     2. Essential hypertension         Medical Decision Making:  Today's Assessment / Status / Plan:     Patient continues to be in sinus tachycardia. Likely due to decreased overall functional capacity. Could also be secondary to severe anxiety and stress. He used  to be on atenolol which he was tolerating pretty well. Metoprolol caused him to have dizziness.    I discussed the option of discontinuation of lisinopril instead putting him on carvedilol for management of his sinus tachycardia and his hypertension. Patient is agreeable with this plan. His blood pressures mostly in the 130s systolic.  He will likely need carvedilol 6.25 mg twice a day. I have advised him to take only half a tablet for the first day before increasing to the full dose.    Concern for obstructive sleep apnea: Patient has been referred to sleep clinic at the last visit. He is still awaiting a consultation.     Return to clinic in 2 months or earlier if needed.    Thank you for allowing me to participate in the care of this patient. Please do not hesitate to contact me with any questions.    Madeline Iraheta MD  Cardiologist  [facility]      PLEASE NOTE: This dictation was created using voice recognition software.         Yaquelin Sesay D.O.  [id number]  [place] [id number]  VIA Facsimile: [phone]

## 2018-01-05 NOTE — PATIENT INSTRUCTIONS
Stop taking lisinopril.   For the first day, take 1/2 tab of carvedilol two times a day.   Then take 1 tab twice daily.

## 2018-01-05 NOTE — PROGRESS NOTES
"Subjective:   Mingo Christiansen is a 52 y.o. male with past medical history significant for hypertension and asthma who is presenting to clinic for follow-up. At his last visit his metoprolol was discontinued since the patient complained of severe fatigue and dizziness while on it. Since stopping the metoprolol, his dizziness has now essentially resolved.     He continues to be on the lisinopril. His blood pressure has been mostly 130s/70s. His heart rate continues to be elevated most of the time.    He reports severe anxiety both at work and at home. He does not exercise much at all. He was referred to sleep clinic at the last visit but he has not heard back from them yet.    Past Medical History:   Diagnosis Date   • Asthma    • Gout    • Hypertension      Past Surgical History:   Procedure Laterality Date   • OTHER ORTHOPEDIC SURGERY  1995    L Knee     History reviewed. No pertinent family history.   Patient is adopted and does not know any of his family history.    History   Smoking Status   • Never Smoker   Smokeless Tobacco   • Never Used     Does not drink alcohol on a regular basis but does occasionally binge drink.  No recreational drug use at this time. Used to smoke marijuana 3-4 years ago.    Allergies   Allergen Reactions   • Erythromycin Unspecified     Pt states \"Years ago he had a high temp\".     Outpatient Encounter Prescriptions as of 1/4/2018   Medication Sig Dispense Refill   • carvedilol (COREG) 6.25 MG Tab Take 1 Tab by mouth 2 times a day, with meals. 60 Tab 11   • Coenzyme Q10 (CO Q 10 PO) Take  by mouth.     • allopurinol (ZYLOPRIM) 300 MG Tab Take 300 mg by mouth every day.     • therapeutic multivitamin-minerals (THERAGRAN-M) Tab Take 2 Tabs by mouth every day.     • fluticasone (CUTIVATE) 0.05 % cream      • ketoconazole (NIZORAL) 2 % Cream      • tacrolimus (PROTOPIC) 0.1 % Ointment      • [DISCONTINUED] lisinopril (PRINIVIL) 5 MG Tab      • mupirocin (BACTROBAN) 2 % Ointment      • " "naproxen (NAPROSYN) 500 MG Tab Take 1 Tab by mouth 2 times a day, with meals. 60 Tab 0   • albuterol (VENTOLIN OR PROVENTIL) 108 (90 BASE) MCG/ACT Aero Soln inhalation aerosol Inhale 2 Puffs by mouth every 6 hours as needed for Shortness of Breath.     • fluticasone-salmeterol (ADVAIR HFA) 115-21 MCG/ACT inhaler Inhale 2 Puffs by mouth 2 times a day.       No facility-administered encounter medications on file as of 1/4/2018.      Review of Systems   Constitutional: Positive for malaise/fatigue.   HENT: Negative.    Respiratory: Negative for shortness of breath.    Cardiovascular: Positive for palpitations. Negative for chest pain, orthopnea, leg swelling and PND.   Gastrointestinal: Negative for abdominal pain.   Musculoskeletal: Negative for falls.   Skin: Negative.    Neurological: Negative for dizziness and loss of consciousness.   Endo/Heme/Allergies: Does not bruise/bleed easily.   Psychiatric/Behavioral: Negative for depression.   All other systems reviewed and are negative.       Objective:   /80   Pulse (!) 114   Ht 1.702 m (5' 7\")   Wt 123.7 kg (272 lb 12.8 oz)   SpO2 94%   BMI 42.73 kg/m²     Physical Exam   Constitutional: He is oriented to person, place, and time. No distress.   HENT:   Head: Normocephalic and atraumatic.   Eyes: Conjunctivae are normal.   Neck: Normal range of motion. Neck supple. No JVD present.   Cardiovascular: Normal rate, regular rhythm and normal heart sounds.  Exam reveals no gallop and no friction rub.    No murmur heard.  Pulmonary/Chest: Effort normal and breath sounds normal. No respiratory distress. He has no wheezes. He has no rales.   Abdominal: Soft. There is no tenderness.   Musculoskeletal: He exhibits no edema.   Neurological: He is alert and oriented to person, place, and time.   Skin: Skin is warm and dry. He is not diaphoretic.   Psychiatric: He has a normal mood and affect.   Nursing note and vitals reviewed.    Echo performed in February 2016 was " reviewed and showed  CONCLUSIONS  Normal left ventricular chamber size.  Normal left ventricular wall thickness.  Normal left ventricular systolic function.  Structurally normal aortic and mitral valve without significant stenosis or regurgitation.  Normal pericardium without effusion.    EKG from November 2017 was reviewed and shows normal sinus rhythm at 87 bpm, normal EKG.    Labs from November 2017 were reviewed and showed normal hemoglobin. Normal creatinine. Normal TSH.  LDL 66.    Holter monitor performed in December 2017 was reviewed and showed  Interpretive Statements   *  Monitoring started at 3:20 PM and continued for 48 hours. Cardiac rhythm   is Sinus. The average heart rate was 95 BPM.   The minimum heart rate was 61 BPM, occurring at 7:22:18 AM. The maximum   heart rate was 148 BPM, occurring   at 3:56:29 PM. The longest R-R interval was 1.1 seconds occurring at   9:14:39 AM D2.   *  Ventricular ectopic activity consisted of 27 single PVCs.   *  The patient's rhythm included 16 hours four minutes 25 seconds of sinus   tachycardia. The fastest single episode occurred   at 3:41:41 PM D1, lasting 21 minutes nine seconds, with maximum heart   rate of 148 BPM.   *  Supraventricular ectopic activity consisted of eight single PACs.   *  Diary entries - no symptoms listed in diary.   Summary:   Normal sinus rhythm/ sinus tachycardia   No sustained arrhythmias noted     Assessment:     1. Tachycardia     2. Essential hypertension         Medical Decision Making:  Today's Assessment / Status / Plan:     Patient continues to be in sinus tachycardia. Likely due to decreased overall functional capacity. Could also be secondary to severe anxiety and stress. He used to be on atenolol which he was tolerating pretty well. Metoprolol caused him to have dizziness.    I discussed the option of discontinuation of lisinopril instead putting him on carvedilol for management of his sinus tachycardia and his hypertension.  Patient is agreeable with this plan. His blood pressures mostly in the 130s systolic.  He will likely need carvedilol 6.25 mg twice a day. I have advised him to take only half a tablet for the first day before increasing to the full dose.    Concern for obstructive sleep apnea: Patient has been referred to sleep clinic at the last visit. He is still awaiting a consultation.     Return to clinic in 2 months or earlier if needed.    Thank you for allowing me to participate in the care of this patient. Please do not hesitate to contact me with any questions.    Madeline Iraheta MD  Cardiologist  [facility]      PLEASE NOTE: This dictation was created using voice recognition software.

## 2018-02-16 ENCOUNTER — OFFICE VISIT (OUTPATIENT)
Dept: OCCUPATIONAL MEDICINE | Facility: CLINIC | Age: 53
End: 2018-02-16

## 2018-02-16 ENCOUNTER — NON-PROVIDER VISIT (OUTPATIENT)
Dept: OCCUPATIONAL MEDICINE | Facility: CLINIC | Age: 53
End: 2018-02-16

## 2018-02-16 DIAGNOSIS — Z02.89 ENCOUNTER FOR OCCUPATIONAL HEALTH EXAMINATION: ICD-10-CM

## 2018-02-16 PROCEDURE — 92553 AUDIOMETRY AIR & BONE: CPT | Performed by: PREVENTIVE MEDICINE

## 2018-02-16 PROCEDURE — 8919 PR LIMITED CLEARANCE: Performed by: PREVENTIVE MEDICINE

## 2018-10-18 ENCOUNTER — OFFICE VISIT (OUTPATIENT)
Dept: URGENT CARE | Facility: PHYSICIAN GROUP | Age: 53
End: 2018-10-18

## 2018-10-18 VITALS
WEIGHT: 280 LBS | BODY MASS INDEX: 43.95 KG/M2 | RESPIRATION RATE: 14 BRPM | HEIGHT: 67 IN | OXYGEN SATURATION: 94 % | TEMPERATURE: 99.2 F | DIASTOLIC BLOOD PRESSURE: 72 MMHG | HEART RATE: 97 BPM | SYSTOLIC BLOOD PRESSURE: 134 MMHG

## 2018-10-18 DIAGNOSIS — J01.00 ACUTE NON-RECURRENT MAXILLARY SINUSITIS: ICD-10-CM

## 2018-10-18 DIAGNOSIS — J20.9 ACUTE BRONCHITIS, UNSPECIFIED ORGANISM: ICD-10-CM

## 2018-10-18 PROCEDURE — 99214 OFFICE O/P EST MOD 30 MIN: CPT | Performed by: PHYSICIAN ASSISTANT

## 2018-10-18 RX ORDER — DOXYCYCLINE HYCLATE 100 MG
100 TABLET ORAL 2 TIMES DAILY
Qty: 20 TAB | Refills: 0 | Status: SHIPPED | OUTPATIENT
Start: 2018-10-18 | End: 2019-01-25

## 2018-10-18 RX ORDER — METHYLPREDNISOLONE 4 MG/1
TABLET ORAL
Qty: 21 TAB | Refills: 0 | Status: SHIPPED | OUTPATIENT
Start: 2018-10-18 | End: 2019-01-25

## 2018-10-18 NOTE — PROGRESS NOTES
Chief Complaint   Patient presents with   • Cough     congestion x 1 week        HISTORY OF PRESENT ILLNESS: Patient is a 53 y.o. male who presents today for 2 weeks of worsening cough/congestion.  Patient states it started with sore throat that improved and then coughing/nasal congestion.    He is having worsening right sided sinus pain and pressure.  He has been getting yellow mucus out of his nose and coughing it up however in last few days he felt his cough has dried up and has had more wheezing.  He denies chest pain or painful breathing.  Unknown fevers but has felt run down overall.      Patient Active Problem List    Diagnosis Date Noted   • Essential hypertension 01/04/2018   • Atypical chest pain 02/15/2016       Allergies:Erythromycin    Current Outpatient Prescriptions Ordered in [facility]   Medication Sig Dispense Refill   • doxycycline (VIBRAMYCIN) 100 MG Tab Take 1 Tab by mouth 2 times a day. 20 Tab 0   • MethylPREDNISolone (MEDROL DOSEPAK) 4 MG Tablet Therapy Pack As directed on the packaging label. 21 Tab 0   • carvedilol (COREG) 6.25 MG Tab Take 1 Tab by mouth 2 times a day, with meals. 60 Tab 11   • ketoconazole (NIZORAL) 2 % Cream      • tacrolimus (PROTOPIC) 0.1 % Ointment      • mupirocin (BACTROBAN) 2 % Ointment      • Coenzyme Q10 (CO Q 10 PO) Take  by mouth.     • naproxen (NAPROSYN) 500 MG Tab Take 1 Tab by mouth 2 times a day, with meals. 60 Tab 0   • allopurinol (ZYLOPRIM) 300 MG Tab Take 300 mg by mouth every day.     • albuterol (VENTOLIN OR PROVENTIL) 108 (90 BASE) MCG/ACT Aero Soln inhalation aerosol Inhale 2 Puffs by mouth every 6 hours as needed for Shortness of Breath.     • fluticasone-salmeterol (ADVAIR HFA) 115-21 MCG/ACT inhaler Inhale 2 Puffs by mouth 2 times a day.     • therapeutic multivitamin-minerals (THERAGRAN-M) Tab Take 2 Tabs by mouth every day.       No current [facility]-ordered facility-administered medications on file.        Past Medical History:   Diagnosis Date   • Asthma  "   • Gout    • Hypertension        Social History   Substance Use Topics   • Smoking status: Never Smoker   • Smokeless tobacco: Never Used   • Alcohol use Yes      Comment: social       No family status information on file.   No family history on file. No pertinent FH    ROS:  Review of Systems   Constitutional: SEE HPI  HENT: SEE HPI  Eyes: Negative for blurred vision.   Respiratory: SEE HPI  Cardiovascular: Negative for chest pain, palpitations, orthopnea and leg swelling.   Gastrointestinal: Negative for heartburn, nausea, vomiting and abdominal pain.   Genitourinary: Negative for dysuria, urgency and frequency.     Exam:  Blood pressure 134/72, pulse 97, temperature 37.3 °C (99.2 °F), temperature source Temporal, resp. rate 14, height 1.702 m (5' 7\"), weight (!) 127 kg (280 lb), SpO2 94 %.  General:  Well nourished, well developed male in NAD  Eyes: PERRLA, EOM within normal limits, no conjunctival injection, no scleral icterus, visual fields and acuity grossly intact.  Ears: Normal shape and symmetry, no tenderness, no discharge. External canals are without any significant edema or erythema. Tympanic membranes are without any inflammation, no effusion. Gross auditory acuity is intact  Nose: Symmetrical, right maxillary sinus mildly TTP, boggy turbinates.   Mouth: reasonable hygiene, no erythema exudates or tonsillar enlargement.  Neck: no masses, range of motion within normal limits, no tracheal deviation. No lymphadenopathy  Pulmonary: Normal respiratory effort, scattered end exp wheezes without crackles or rhonchi.   Cardiovascular: regular rate and rhythm without murmurs, rubs, or gallops.  Skin: No visible rashes or lesion. Warm, pink, dry.   Extremities: no clubbing, cyanosis, or edema.  Neuro: A&O x 3. Speech normal/clear.  Normal gait.       Assessment/Plan:  1. Acute bronchitis, unspecified organism  MethylPREDNISolone (MEDROL DOSEPAK) 4 MG Tablet Therapy Pack   2. Acute non-recurrent maxillary " sinusitis  doxycycline (VIBRAMYCIN) 100 MG Tab       -tx coverage as above, patient worsening at day 14, right maxillary sinusitis/bronchitis.   -sinus and lung care discussed.   -work note provided  -humidifier/steamy showers recommended for lung soothing.  Rest and fluids emphasized.        Supportive care, differential diagnoses, and indications for immediate follow-up discussed with patient.   Pathogenesis of diagnosis discussed including typical length and natural progression.   Instructed to return to clinic or nearest emergency department for any change in condition, further concerns, or worsening of symptoms.  Patient states understanding of the plan of care and discharge instructions.  Instructed to make an appointment, for follow up, with their primary care provider.      Tamiko Syed P.A.-C.

## 2018-10-18 NOTE — LETTER
October 18, 2018         Patient: Mingo Christiansen   YOB: 1965   Date of Visit: 10/18/2018           To Whom it May Concern:    Mingo Christiansen was seen in my clinic on 10/18/2018.  Please excuse him from missed work 10/17/18-10/19/18.  He may return Monday 10/22/18    If you have any questions or concerns, please don't hesitate to call.        Sincerely,           Tamiko Syed P.A.-C.  Electronically Signed

## 2018-11-01 ENCOUNTER — NON-PROVIDER VISIT (OUTPATIENT)
Dept: URGENT CARE | Facility: PHYSICIAN GROUP | Age: 53
End: 2018-11-01

## 2018-11-01 DIAGNOSIS — Z02.1 PRE-EMPLOYMENT DRUG SCREENING: ICD-10-CM

## 2018-11-01 LAB
AMP AMPHETAMINE: NORMAL
COC COCAINE: NORMAL
INT CON NEG: NEGATIVE
INT CON POS: POSITIVE
MET METHAMPHETAMINES: NORMAL
OPI OPIATES: NORMAL
PCP PHENCYCLIDINE: NORMAL
POC DRUG COMMENT 753798-OCCUPATIONAL HEALTH: NEGATIVE
THC: NORMAL

## 2018-11-01 PROCEDURE — 80305 DRUG TEST PRSMV DIR OPT OBS: CPT | Performed by: PHYSICIAN ASSISTANT

## 2018-12-28 RX ORDER — CARVEDILOL 6.25 MG/1
6.25 TABLET ORAL 2 TIMES DAILY WITH MEALS
Qty: 180 TAB | Refills: 0 | Status: SHIPPED | OUTPATIENT
Start: 2018-12-28 | End: 2019-04-02 | Stop reason: SDUPTHER

## 2019-01-25 ENCOUNTER — OFFICE VISIT (OUTPATIENT)
Dept: CARDIOLOGY | Facility: MEDICAL CENTER | Age: 54
End: 2019-01-25

## 2019-01-25 VITALS
OXYGEN SATURATION: 91 % | SYSTOLIC BLOOD PRESSURE: 136 MMHG | HEART RATE: 90 BPM | WEIGHT: 286 LBS | HEIGHT: 67 IN | BODY MASS INDEX: 44.89 KG/M2 | DIASTOLIC BLOOD PRESSURE: 82 MMHG

## 2019-01-25 DIAGNOSIS — Z13.220 SCREENING FOR CHOLESTEROL LEVEL: ICD-10-CM

## 2019-01-25 DIAGNOSIS — I10 ESSENTIAL HYPERTENSION: ICD-10-CM

## 2019-01-25 PROCEDURE — 99213 OFFICE O/P EST LOW 20 MIN: CPT | Performed by: INTERNAL MEDICINE

## 2019-01-25 RX ORDER — INFLUENZA A VIRUS A/IDAHO/07/2018 (H1N1) ANTIGEN (MDCK CELL DERIVED, PROPIOLACTONE INACTIVATED, INFLUENZA A VIRUS A/INDIANA/08/2018 (H3N2) ANTIGEN (MDCK CELL DERIVED, PROPIOLACTONE INACTIVATED), INFLUENZA B VIRUS B/SINGAPORE/INFTT-16-0610/2016 ANTIGEN (MDCK CELL DERIVED, PROPIOLACTONE INACTIVATED), INFLUENZA B VIRUS B/IOWA/06/2017 ANTIGEN (MDCK CELL DERIVED, PROPIOLACTONE INACTIVATED) 15; 15; 15; 15 UG/.5ML; UG/.5ML; UG/.5ML; UG/.5ML
INJECTION, SUSPENSION INTRAMUSCULAR
Refills: 0 | COMMUNITY
Start: 2019-01-04 | End: 2019-01-25

## 2019-01-25 ASSESSMENT — ENCOUNTER SYMPTOMS
DEPRESSION: 0
ORTHOPNEA: 0
PALPITATIONS: 0
PND: 0
DIZZINESS: 0
LOSS OF CONSCIOUSNESS: 0
FALLS: 0
ABDOMINAL PAIN: 0
SHORTNESS OF BREATH: 0

## 2019-01-25 NOTE — PROGRESS NOTES
"Subjective:   Mingo Christiansen is a 53-year-old male presenting to clinic for follow-up on hypertension.    Patient is under a lot of stress right now as he quit his job and is currently unemployed.  His father is in his late 90s and has been stressing him as well.    He reports that his blood pressures have been well controlled, usually 110s-120s systolic.    Due to the significant stress, he has not been able to exercise much.    Past Medical History:   Diagnosis Date   • Asthma    • Gout    • Hypertension      Past Surgical History:   Procedure Laterality Date   • OTHER ORTHOPEDIC SURGERY  1995    L Knee     History reviewed. No pertinent family history.   Patient is adopted and does not know any of his family history.    History   Smoking Status   • Never Smoker   Smokeless Tobacco   • Never Used     Occasional alcohol use.    Allergies   Allergen Reactions   • Erythromycin Unspecified     Pt states \"Years ago he had a high temp\".     Outpatient Encounter Prescriptions as of 1/25/2019   Medication Sig Dispense Refill   • carvedilol (COREG) 6.25 MG Tab Take 1 Tab by mouth 2 times a day, with meals. Needs to be seen for further refills. Thank you 180 Tab 0   • ketoconazole (NIZORAL) 2 % Cream      • Coenzyme Q10 (CO Q 10 PO) Take  by mouth.     • allopurinol (ZYLOPRIM) 300 MG Tab Take 300 mg by mouth every day.     • albuterol (VENTOLIN OR PROVENTIL) 108 (90 BASE) MCG/ACT Aero Soln inhalation aerosol Inhale 2 Puffs by mouth every 6 hours as needed for Shortness of Breath.     • therapeutic multivitamin-minerals (THERAGRAN-M) Tab Take 2 Tabs by mouth every day.     • [DISCONTINUED] FLUCELVAX QUADRIVALENT 0.5 ML Suspension Prefilled Syringe injection TO BE ADMINISTERED BY PHARMACIST FOR IMMUNIZATION  0   • [DISCONTINUED] doxycycline (VIBRAMYCIN) 100 MG Tab Take 1 Tab by mouth 2 times a day. 20 Tab 0   • [DISCONTINUED] MethylPREDNISolone (MEDROL DOSEPAK) 4 MG Tablet Therapy Pack As directed on the packaging label. 21 " "Tab 0   • tacrolimus (PROTOPIC) 0.1 % Ointment      • mupirocin (BACTROBAN) 2 % Ointment      • naproxen (NAPROSYN) 500 MG Tab Take 1 Tab by mouth 2 times a day, with meals. 60 Tab 0   • fluticasone-salmeterol (ADVAIR HFA) 115-21 MCG/ACT inhaler Inhale 2 Puffs by mouth 2 times a day.       No facility-administered encounter medications on file as of 1/25/2019.      Review of Systems   Constitutional: Negative for malaise/fatigue.   Respiratory: Negative for shortness of breath.    Cardiovascular: Negative for chest pain, palpitations, orthopnea, leg swelling and PND.   Gastrointestinal: Negative for abdominal pain.   Musculoskeletal: Negative for falls.   Neurological: Negative for dizziness and loss of consciousness.   Psychiatric/Behavioral: Negative for depression.   All other systems reviewed and are negative.       Objective:   /82 (BP Location: Left arm, Patient Position: Sitting, BP Cuff Size: Adult)   Pulse 90   Ht 1.702 m (5' 7\")   Wt (!) 129.7 kg (286 lb)   SpO2 91%   BMI 44.79 kg/m²     Physical Exam   Constitutional: He is oriented to person, place, and time. He appears well-developed and well-nourished. No distress.   HENT:   Head: Normocephalic and atraumatic.   Eyes: Conjunctivae are normal.   Neck: Normal range of motion. Neck supple. No JVD present.   Cardiovascular: Normal rate, regular rhythm and normal heart sounds.  Exam reveals no gallop and no friction rub.    No murmur heard.  Pulmonary/Chest: Effort normal and breath sounds normal. No respiratory distress. He has no wheezes. He has no rales.   Abdominal: Soft. There is no tenderness.   Musculoskeletal: He exhibits no edema.   Neurological: He is alert and oriented to person, place, and time.   Skin: Skin is warm and dry. He is not diaphoretic.   Psychiatric: He has a normal mood and affect.   Nursing note and vitals reviewed.    Echocardiogram performed in February 2016 showed normal LV function.    Holter monitor performed in " December 2017 showed sinus rhythm.  No arrhythmias.    Assessment:     1. Essential hypertension  BASIC METABOLIC PANEL   2. Screening for cholesterol level  Lipid Profile       Medical Decision Making:  Today's Assessment / Status / Plan:     Hypertension: Blood pressure is at goal.  Continue carvedilol at current dose.  Chem-7 ordered today.    Fasting lipid panel ordered for screening purposes.    Total 15 minutes face-to-face time spent with patient, with greater than 50% of the total time discussing patient's issues and symptoms as listed above in assessment and plan, as well as managing coordination of care for future evaluation and treatment. Most of the time was spent discussing the importance of exercise and weight loss.  Dietary modification discussed as well.      Return to clinic in 1 year or earlier if needed.    Thank you for allowing me to participate in the care of this patient. Please do not hesitate to contact me with any questions.    Madeline Iraheta MD  Cardiologist  [facility]      PLEASE NOTE: This dictation was created using voice recognition software.

## 2019-04-02 RX ORDER — CARVEDILOL 6.25 MG/1
6.25 TABLET ORAL 2 TIMES DAILY WITH MEALS
Qty: 180 TAB | Refills: 3 | Status: SHIPPED | OUTPATIENT
Start: 2019-04-02 | End: 2020-02-06

## 2019-05-18 ENCOUNTER — HOSPITAL ENCOUNTER (OUTPATIENT)
Dept: LAB | Facility: MEDICAL CENTER | Age: 54
End: 2019-05-18
Attending: PODIATRIST

## 2019-05-18 ENCOUNTER — HOSPITAL ENCOUNTER (OUTPATIENT)
Dept: LAB | Facility: MEDICAL CENTER | Age: 54
End: 2019-05-18
Attending: INTERNAL MEDICINE

## 2019-05-18 DIAGNOSIS — Z13.220 SCREENING FOR CHOLESTEROL LEVEL: ICD-10-CM

## 2019-05-18 DIAGNOSIS — I10 ESSENTIAL HYPERTENSION: ICD-10-CM

## 2019-05-18 LAB
ANION GAP SERPL CALC-SCNC: 5 MMOL/L (ref 0–11.9)
BUN SERPL-MCNC: 13 MG/DL (ref 8–22)
CALCIUM SERPL-MCNC: 9.6 MG/DL (ref 8.5–10.5)
CHLORIDE SERPL-SCNC: 105 MMOL/L (ref 96–112)
CHOLEST SERPL-MCNC: 180 MG/DL (ref 100–199)
CO2 SERPL-SCNC: 29 MMOL/L (ref 20–33)
CREAT SERPL-MCNC: 0.88 MG/DL (ref 0.5–1.4)
FASTING STATUS PATIENT QL REPORTED: NORMAL
GLUCOSE SERPL-MCNC: 106 MG/DL (ref 65–99)
HDLC SERPL-MCNC: 66 MG/DL
LDLC SERPL CALC-MCNC: 92 MG/DL
POTASSIUM SERPL-SCNC: 4.5 MMOL/L (ref 3.6–5.5)
SODIUM SERPL-SCNC: 139 MMOL/L (ref 135–145)
TRIGL SERPL-MCNC: 110 MG/DL (ref 0–149)
URATE SERPL-MCNC: 5 MG/DL (ref 2.5–8.3)

## 2019-05-18 PROCEDURE — 84550 ASSAY OF BLOOD/URIC ACID: CPT

## 2019-05-18 PROCEDURE — 36415 COLL VENOUS BLD VENIPUNCTURE: CPT

## 2019-05-18 PROCEDURE — 80048 BASIC METABOLIC PNL TOTAL CA: CPT

## 2019-05-18 PROCEDURE — 80061 LIPID PANEL: CPT

## 2019-09-09 ENCOUNTER — OCCUPATIONAL MEDICINE (OUTPATIENT)
Dept: URGENT CARE | Facility: PHYSICIAN GROUP | Age: 54
End: 2019-09-09
Payer: COMMERCIAL

## 2019-09-09 VITALS
RESPIRATION RATE: 16 BRPM | HEART RATE: 84 BPM | TEMPERATURE: 98.6 F | HEIGHT: 67 IN | WEIGHT: 276 LBS | DIASTOLIC BLOOD PRESSURE: 80 MMHG | OXYGEN SATURATION: 92 % | SYSTOLIC BLOOD PRESSURE: 122 MMHG | BODY MASS INDEX: 43.32 KG/M2

## 2019-09-09 DIAGNOSIS — S86.812A STRAIN OF CALF MUSCLE, LEFT, INITIAL ENCOUNTER: ICD-10-CM

## 2019-09-09 DIAGNOSIS — S86.912A MUSCLE STRAIN OF LEFT LOWER LEG, INITIAL ENCOUNTER: ICD-10-CM

## 2019-09-09 DIAGNOSIS — M79.605 ACUTE LEG PAIN, LEFT: ICD-10-CM

## 2019-09-09 LAB
BREATH ALCOHOL COMMENT: NORMAL
POC BREATHALIZER: 0 PERCENT (ref 0–0.01)

## 2019-09-09 PROCEDURE — 99212 OFFICE O/P EST SF 10 MIN: CPT | Performed by: NURSE PRACTITIONER

## 2019-09-09 PROCEDURE — 82075 ASSAY OF BREATH ETHANOL: CPT | Mod: 29 | Performed by: NURSE PRACTITIONER

## 2019-09-09 PROCEDURE — 80305 DRUG TEST PRSMV DIR OPT OBS: CPT | Mod: 29 | Performed by: NURSE PRACTITIONER

## 2019-09-09 ASSESSMENT — ENCOUNTER SYMPTOMS
MYALGIAS: 1
WHEEZING: 0
TINGLING: 1
COUGH: 0
SENSORY CHANGE: 1
SHORTNESS OF BREATH: 0

## 2019-09-09 NOTE — LETTER
[facility]  [address] - [place] [address]  Phone:  [phone] - Fax:  [phone]   [facility] Progress Report and Disability Certification  Date of Service: 9/9/2019   No Show:  No  Date / Time of Next Visit:     Claim Information   Patient Name: Mingo Christiansen  Claim Number:     Employer: [employer] *** Date of Injury: 9/6/2019     Insurer / TPA: Esis *** ID / SSN:     Occupation:  *** Diagnosis: Diagnoses of Acute leg pain, left, Muscle strain of left lower leg, initial encounter, and Strain of calf muscle, left, initial encounter were pertinent to this visit.    Medical Information   Related to Industrial Injury? Yes ***   Subjective Complaints:  DOI: 9/6/19. Patient is 54 year old male with left thigh pain after tying pallets while on knees. He developed left thigh pain at this time. He reports thigh and calf pain, both posterior. He denies knee pain or back pain. He has pain at bottom of foot. He rates leg pain as 3/10 at rest and 8/10 with walking. He has taken aleve only with no improvement. Denies cough, shortness of breath or wheezing. No second job, no prior issue with this leg.   Objective Findings: Physical Exam   Constitutional: He is oriented to person, place, and time. He appears well-developed and well-nourished. No distress.   Cardiovascular: Normal rate, regular rhythm and normal heart sounds.   No murmur heard.  Pulmonary/Chest: Effort normal and breath sounds normal. No respiratory distress.   Musculoskeletal: Normal range of motion.        Left upper leg: He exhibits tenderness. He exhibits no swelling and no edema.        Left lower leg: He exhibits tenderness. He exhibits no swelling and no edema.   Neurological: He is alert and oriented to person, place, and time.   Skin: Skin is warm and dry. No erythema.   Psychiatric: He has a normal mood and affect. His behavior is normal. Thought content normal.    Nursing note and vitals reviewed.     Pre-Existing Condition(s):     Assessment:   Initial Visit    Status: Additional Care Required  Permanent Disability:No    Plan:      Diagnostics:      Comments:  Ultrasound to rule out DVT based on symptoms.    Disability Information   Status:      From:     Through:   Restrictions are:     Physical Restrictions   Sitting:    Standing:  < or = to 2 hrs/day Stooping:    Bending:      Squattin hrs/day Walking:  < or = to 2 hrs/day Climbin hrs/day Pushing:      Pulling:    Other:    Reaching Above Shoulder (L):   Reaching Above Shoulder (R):       Reaching Below Shoulder (L):    Reaching Below Shoulder (R):      Not to exceed Weight Limits   Carrying(hrs):   Weight Limit(lb):   Lifting(hrs):   Weight  Limit(lb):     Comments:      Repetitive Actions   Hands: i.e. Fine Manipulations from Grasping:     Feet: i.e. Operating Foot Controls:     Driving / Operate Machinery:     Physician Name: AYDE Moctezuma Physician Signature: JAYNA Spaulding e-Signature: Dr. Elliott Shrestha, Medical Director   Clinic Name / Location: [address]  [place] [address] Clinic Phone Number: Dept: [phone]   Appointment Time: 11:55 Am Visit Start Time: 12:22 PM   Check-In Time:  12:06 Pm Visit Discharge Time:  ***   Original-Treating Physician or Chiropractor    Page 2-Insurer/TPA    Page 3-Employer    Page 4-Employee

## 2019-09-09 NOTE — LETTER
[facility]  [address] - [place] [address]  Phone:  [phone] - Fax:  [phone]   [facility] Progress Report and Disability Certification  Date of Service: 9/9/2019   No Show:  No  Date / Time of Next Visit:     Claim Information   Patient Name: Mingo Christiansen  Claim Number:     Employer: [employer]  Date of Injury: 9/6/2019     Insurer / TPA: Esis  ID / SSN: [id number]    Occupation:   Diagnosis: Diagnoses of Acute leg pain, left, Muscle strain of left lower leg, initial encounter, and Strain of calf muscle, left, initial encounter were pertinent to this visit.    Medical Information   Related to Industrial Injury? Yes    Subjective Complaints:  DOI: 9/6/19. Patient is 54 year old male with left thigh pain after tying pallets while on knees. He developed left thigh pain at this time. He reports thigh and calf pain, both posterior. He denies knee pain or back pain. He has pain at bottom of foot. He rates leg pain as 3/10 at rest and 8/10 with walking. He has taken aleve only with no improvement. Denies cough, shortness of breath or wheezing. No second job, no prior issue with this leg.   Objective Findings: Physical Exam   Constitutional: He is oriented to person, place, and time. He appears well-developed and well-nourished. No distress.   Cardiovascular: Normal rate, regular rhythm and normal heart sounds.   No murmur heard.  Pulmonary/Chest: Effort normal and breath sounds normal. No respiratory distress.   Musculoskeletal: Normal range of motion.        Left upper leg: He exhibits tenderness. He exhibits no swelling and no edema.        Left lower leg: He exhibits tenderness. He exhibits no swelling and no edema.   Neurological: He is alert and oriented to person, place, and time.   Skin: Skin is warm and dry. No erythema.   Psychiatric: He has a normal mood and affect. His behavior is normal. Thought content normal.   Nursing  note and vitals reviewed.     Pre-Existing Condition(s):     Assessment:   Initial Visit    Status: Additional Care Required  Permanent Disability:No    Plan:      Diagnostics:      Comments:  Ultrasound to rule out DVT based on symptoms.    Disability Information   Status:      From:     Through:   Restrictions are:     Physical Restrictions   Sitting:    Standing:  < or = to 2 hrs/day Stooping:    Bending:      Squattin hrs/day Walking:  < or = to 2 hrs/day Climbin hrs/day Pushing:      Pulling:    Other:    Reaching Above Shoulder (L):   Reaching Above Shoulder (R):       Reaching Below Shoulder (L):    Reaching Below Shoulder (R):      Not to exceed Weight Limits   Carrying(hrs):   Weight Limit(lb):   Lifting(hrs):   Weight  Limit(lb):     Comments:      Repetitive Actions   Hands: i.e. Fine Manipulations from Grasping:     Feet: i.e. Operating Foot Controls:     Driving / Operate Machinery:     Physician Name: AYDE Moctezuma Physician Signature: JAYNA Spaulding e-Signature: Dr. Elliott Shrestha, Medical Director   Clinic Name / Location: [address]  [place] [address] Clinic Phone Number: Dept: [phone]   Appointment Time: 11:55 Am Visit Start Time: 12:22 PM   Check-In Time:  12:06 Pm Visit Discharge Time:  3:02 PM   Original-Treating Physician or Chiropractor    Page 2-Insurer/TPA    Page 3-Employer    Page 4-Employee

## 2019-09-09 NOTE — PROGRESS NOTES
Subjective:      Mingo Christiansen is a 54 y.o. male who presents with Work-Related Injury (Left thigh pain that travels to the foot.  Left foot feels asleep.)            HPI DOI: 9/6/19. Patient is 54 year old male with left thigh pain after tying pallets while on knees. He developed left thigh pain at this time. He reports thigh and calf pain, both posterior. He denies knee pain or back pain. He has pain at bottom of foot. He rates leg pain as 3/10 at rest and 8/10 with walking. He has taken aleve only with no improvement. Denies cough, shortness of breath or wheezing. No second job, no prior issue with this leg.  Erythromycin  Current Outpatient Medications on File Prior to Visit   Medication Sig Dispense Refill   • carvedilol (COREG) 6.25 MG Tab Take 1 Tab by mouth 2 times a day, with meals. 180 Tab 3   • ketoconazole (NIZORAL) 2 % Cream      • tacrolimus (PROTOPIC) 0.1 % Ointment      • mupirocin (BACTROBAN) 2 % Ointment      • Coenzyme Q10 (CO Q 10 PO) Take  by mouth.     • naproxen (NAPROSYN) 500 MG Tab Take 1 Tab by mouth 2 times a day, with meals. 60 Tab 0   • allopurinol (ZYLOPRIM) 300 MG Tab Take 300 mg by mouth every day.     • albuterol (VENTOLIN OR PROVENTIL) 108 (90 BASE) MCG/ACT Aero Soln inhalation aerosol Inhale 2 Puffs by mouth every 6 hours as needed for Shortness of Breath.     • fluticasone-salmeterol (ADVAIR HFA) 115-21 MCG/ACT inhaler Inhale 2 Puffs by mouth 2 times a day.     • therapeutic multivitamin-minerals (THERAGRAN-M) Tab Take 2 Tabs by mouth every day.       No current facility-administered medications on file prior to visit.      Social History     Socioeconomic History   • Marital status: Single     Spouse name: Not on file   • Number of children: Not on file   • Years of education: Not on file   • Highest education level: Not on file   Occupational History   • Not on file   Social Needs   • Financial resource strain: Not on file   • Food insecurity:     Worry: Not on file      "Inability: Not on file   • Transportation needs:     Medical: Not on file     Non-medical: Not on file   Tobacco Use   • Smoking status: Never Smoker   • Smokeless tobacco: Never Used   Substance and Sexual Activity   • Alcohol use: Yes     Comment: social   • Drug use: No   • Sexual activity: Not on file   Lifestyle   • Physical activity:     Days per week: Not on file     Minutes per session: Not on file   • Stress: Not on file   Relationships   • Social connections:     Talks on phone: Not on file     Gets together: Not on file     Attends Yarsani service: Not on file     Active member of club or organization: Not on file     Attends meetings of clubs or organizations: Not on file     Relationship status: Not on file   • Intimate partner violence:     Fear of current or ex partner: Not on file     Emotionally abused: Not on file     Physically abused: Not on file     Forced sexual activity: Not on file   Other Topics Concern   • Not on file   Social History Narrative   • Not on file     Breast Cancer-related family history is not on file.      Review of Systems   Respiratory: Negative for cough, shortness of breath and wheezing.    Cardiovascular: Negative for chest pain.   Musculoskeletal: Positive for myalgias. Negative for joint pain.   Neurological: Positive for tingling and sensory change.          Objective:     /80   Pulse 84   Temp 37 °C (98.6 °F) (Temporal)   Resp 16   Ht 1.702 m (5' 7\")   Wt (!) 125.2 kg (276 lb)   SpO2 92%   BMI 43.23 kg/m²      Physical Exam   Constitutional: He is oriented to person, place, and time. He appears well-developed and well-nourished. No distress.   Cardiovascular: Normal rate, regular rhythm and normal heart sounds.   No murmur heard.  Pulmonary/Chest: Effort normal and breath sounds normal. No respiratory distress.   Musculoskeletal: Normal range of motion.        Left upper leg: He exhibits tenderness. He exhibits no swelling and no edema.        Left " lower leg: He exhibits tenderness. He exhibits no swelling and no edema.   Neurological: He is alert and oriented to person, place, and time.   Skin: Skin is warm and dry. No erythema.   Psychiatric: He has a normal mood and affect. His behavior is normal. Thought content normal.   Nursing note and vitals reviewed.              Assessment/Plan:     1. Acute leg pain, left  US-EXTREMITY VENOUS LOWER UNILAT LEFT     Likely strain.  Ultrasound to rule out DVT  Restrictions per D39  Follow up Saturday.

## 2019-09-09 NOTE — LETTER
"EMPLOYEE’S CLAIM FOR COMPENSATION/ REPORT OF INITIAL TREATMENT  FORM C-4    EMPLOYEE’S CLAIM - PROVIDE ALL INFORMATION REQUESTED   First Name  Mingo Last Name  Елена Birthdate                    1965                Sex  male Claim Number   Home Address  [address] Age  54 y.o. Height  1.702 m (5' 7\") Weight  (!) 125.2 kg (276 lb) [facility]     [facility] [place]  [address] Telephone  [phone] (home)    Mailing Address  [address] [facility] [place]  [address] Primary Language Spoken  English    Insurer   Third Party   [employer]   Employee's Occupation (Job Title) When Injury or Occupational Disease Occurred      Employer's Name  [employer]  Telephone  [phone]    Employer Address  [address]  [address]   Date of Injury  9/6/2019               Hour of Injury  3:30 PM Date Employer Notified  9/9/2019 Last Day of Work after Injury or Occupational Disease  9/9/2019 Supervisor to Whom Injury Reported  Keren Garcia   Address or Location of Accident (if applicable)  [[address] [address]]   What were you doing at the time of accident? (if applicable)  Kneeling to wrap a pallet and also had to kneel to work on     How did this injury or occupational disease occur? (Be specific an answer in detail. Use additional sheet if necessary)  I was kneeling with my knees on the floor to connect the wrap to the pallet, then used my left leg to lift myself up.  I also did the same above motion to fix the .   If you believe that you have an occupational disease, when did you first have knowledge of the disability and it relationship to your employment?  N/A Witnesses to the Accident  N/A      Nature of Injury or Occupational Disease  Strain  Part(s) of Body Injured or Affected  Lower Leg (L), Foot (L), Upper Leg (L)    I certify that the above is true and " correct to the best of my knowledge and that I have provided this information in order to obtain the benefits of [place]  I hereby authorize any physician, chiropractor, surgeon, practitioner, or other person, any hospital, including [facility] or [facility], any medical service organization, any insurance company, or other institution or organization to release to each other, any medical or other information, including benefits paid or payable, pertinent to this injury or disease, except information relative to diagnosis, treatment and/or counseling for AIDS, psychological conditions, alcohol or controlled substances, for which I must give specific authorization.  A Photostat of this authorization shall be as valid as the original.     Date 9/9/19   [facility]    Employee’s Signature   THIS REPORT MUST BE COMPLETED AND MAILED WITHIN 3 WORKING DAYS OF TREATMENT   Place  [facility]  Name of Facility  [facility]   Date  9/9/2019 Diagnosis  (M79.605) Acute leg pain, left  (S86.912A) Muscle strain of left lower leg, initial encounter  (S86.812A) Strain of calf muscle, left, initial encounter Is there evidence the injured employee was under the influence of alcohol and/or another controlled substance at the time of accident?   Hour  12:22 PM Description of Injury or Disease  Diagnoses of Acute leg pain, left, Muscle strain of left lower leg, initial encounter, and Strain of calf muscle, left, initial encounter were pertinent to this visit. No   Treatment  nsaids and ultrasound. Icing to area.  Have you advised the patient to remain off work five days or more? No   X-Ray Findings      If Yes   From Date  To Date      From information given by the employee, together with medical evidence, can you directly connect this injury or occupational disease as job incurred?  Yes  "If No Full Duty  No Modified Duty  Yes   Is additional medical care by a physician indicated?  Yes If Modified Duty, Specify any Limitations / Restrictions  Restrictions per D39   Do you know of any previous injury or disease contributing to this condition or occupational disease?                            Yes  Comments:patient with obesity   Date  9/9/2019 Print Doctor’s Name AYDE Moctezuma I certify the employer’s copy of  this form was mailed on:   Address  [address] [place] [address]. [address] Insurer’s Use Only     [facility]  [address]    Provider’s Tax ID Number  [id number] Telephone  Dept: [phone]        e-JAYNA Maxwell   e-Signature: Dr. Elliott Shrestha, Medical Director Degree  MD        ORIGINAL-TREATING PHYSICIAN OR CHIROPRACTOR    PAGE 2-INSURER/TPA    PAGE 3-EMPLOYER    PAGE 4-EMPLOYEE             Form C-4 (rev.10/07)              BRIEF DESCRIPTION OF RIGHTS AND BENEFITS  (Pursuant to [place]RS 616C.050)    Notice of Injury or Occupational Disease (Incident Report Form C-1): If an injury or occupational disease (OD) arises out of and in the course of employment, you must provide written notice to your employer as soon as practicable, but no later than 7 days after the accident or OD. Your employer shall maintain a sufficient supply of the required forms.    Claim for Compensation (Form C-4): If medical treatment is sought, the form C-4 is available at the place of initial treatment. A completed \"Claim for Compensation\" (Form C-4) must be filed within 90 days after an accident or OD. The treating physician or chiropractor must, within 3 working days after treatment, complete and mail to the employer, the employer's insurer and third-party , the Claim for Compensation.    Medical Treatment: If you require medical treatment for your on-the-job injury or OD, you may be required to select a physician or chiropractor from a list provided by your workers’ compensation " insurer, if it has contracted with an Organization for Managed Care (MCO) or Preferred Provider Organization (PPO) or providers of health care. If your employer has not entered into a contract with an MCO or PPO, you may select a physician or chiropractor from the Panel of Physicians and Chiropractors. Any medical costs related to your industrial injury or OD will be paid by your insurer.    Temporary Total Disability (TTD): If your doctor has certified that you are unable to work for a period of at least 5 consecutive days, or 5 cumulative days in a 20-day period, or places restrictions on you that your employer does not accommodate, you may be entitled to TTD compensation.    Temporary Partial Disability (TPD): If the wage you receive upon reemployment is less than the compensation for TTD to which you are entitled, the insurer may be required to pay you TPD compensation to make up the difference. TPD can only be paid for a maximum of 24 months.    Permanent Partial Disability (PPD): When your medical condition is stable and there is an indication of a PPD as a result of your injury or OD, within 30 days, your insurer must arrange for an evaluation by a rating physician or chiropractor to determine the degree of your PPD. The amount of your PPD award depends on the date of injury, the results of the PPD evaluation and your age and wage.    Permanent Total Disability (PTD): If you are medically certified by a treating physician or chiropractor as permanently and totally disabled and have been granted a PTD status by your insurer, you are entitled to receive monthly benefits not to exceed 66 2/3% of your average monthly wage. The amount of your PTD payments is subject to reduction if you previously received a PPD award.    Vocational Rehabilitation Services: You may be eligible for vocational rehabilitation services if you are unable to return to the job due to a permanent physical impairment or permanent  restrictions as a result of your injury or occupational disease.    Transportation and Per Sarah Reimbursement: You may be eligible for travel expenses and per sarah associated with medical treatment.    Reopening: You may be able to reopen your claim if your condition worsens after claim closure.    Appeal Process: If you disagree with a written determination issued by the insurer or the insurer does not respond to your request, you may appeal to the Department of Administration, , by following the instructions contained in your determination letter. You must appeal the determination within 70 days from the date of the determination letter at [address], [address], [place], [place] [address], or [address] [place] [address], [address], [place], [place] [address]. If you disagree with the  decision, you may appeal to the Department of Administration, . You must file your appeal within 30 days from the date of the  decision letter at [address], [address], [place], [place] [address], or [address] [place] [address], [address], [place], [place] [address]. If you disagree with a decision of an , you may file a petition for judicial review with the District Court. You must do so within 30 days of the Appeal Officer’s decision. You may be represented by an  at your own expense or you may contact the [facility] for possible representation.    [place]  for Injured Workers (NAIW): If you disagree with a  decision, you may request that NAIW represent you without charge at an  Hearing. For information regarding denial of benefits, you may contact the [facility] at: [address], [address], [place] [address], [phone], or [address] [place] [address], [address], [place] [address], [phone]    To File a Complaint with the Division: If you wish to file a complaint with the  of the Division of  Industrial Relations (DIR),  please contact the Workers’ Compensation Section, [address], [address], [place], [place] [address], telephone [phone], or [address], [address], [place], [place] [address], telephone [phone]) [phone].    For assistance with Workers’ Compensation Issues: you may contact the Office of the [facility] Consumer Health Assistance, [address] [place] [address], [address], [place], [place] [address], Toll Free 1-916.375.1832, Web site: http://govCan'tWait.[facility].nv., E-mail [email or web address].                             __________________________________________________________________                                                                   _________________                Employee Name / Signature                                                                                                                                                       Date                                                                                                                                                                                                     D-2 (rev. 06/18)

## 2019-09-10 ENCOUNTER — HOSPITAL ENCOUNTER (OUTPATIENT)
Dept: RADIOLOGY | Facility: MEDICAL CENTER | Age: 54
End: 2019-09-10
Attending: NURSE PRACTITIONER
Payer: COMMERCIAL

## 2019-09-10 DIAGNOSIS — M79.605 ACUTE LEG PAIN, LEFT: ICD-10-CM

## 2019-09-10 PROCEDURE — 93971 EXTREMITY STUDY: CPT | Mod: LT

## 2019-09-10 PROCEDURE — 93971 EXTREMITY STUDY: CPT | Mod: 26,LT | Performed by: INTERNAL MEDICINE

## 2019-09-13 ENCOUNTER — OCCUPATIONAL MEDICINE (OUTPATIENT)
Dept: URGENT CARE | Facility: PHYSICIAN GROUP | Age: 54
End: 2019-09-13
Payer: COMMERCIAL

## 2019-09-13 VITALS
SYSTOLIC BLOOD PRESSURE: 128 MMHG | OXYGEN SATURATION: 96 % | RESPIRATION RATE: 16 BRPM | DIASTOLIC BLOOD PRESSURE: 72 MMHG | HEIGHT: 67 IN | TEMPERATURE: 98.6 F | HEART RATE: 74 BPM | BODY MASS INDEX: 43.32 KG/M2 | WEIGHT: 276 LBS

## 2019-09-13 DIAGNOSIS — S86.819D STRAIN OF CALF MUSCLE, SUBSEQUENT ENCOUNTER: ICD-10-CM

## 2019-09-13 PROCEDURE — 99213 OFFICE O/P EST LOW 20 MIN: CPT | Performed by: PHYSICIAN ASSISTANT

## 2019-09-13 ASSESSMENT — ENCOUNTER SYMPTOMS: LEG PAIN: 1

## 2019-09-13 NOTE — PROGRESS NOTES
"Subjective:      Mingo Christiansen is a 54 y.o. male who presents with Leg Pain (Lt leg, WC/fv )      DOI: 9/6/19. Patient is 54 year old male with left thigh pain after tying pallets while on knees. He developed left thigh pain at this time. He reports thigh and calf pain, both posterior. He denies knee pain or back pain.  Patient notes complete resolution of pain from last visit to today.  Denies specific injury besides kneeling as described above.  Denies knee pain.  Does have a historical surgery to left knee.  Notes used over-the-counter Tylenol and rest and has had 100% resolution of pain.  Patient would like to close case MMI at this time.     Leg Pain         ROS       Objective:     /72   Pulse 74   Temp 37 °C (98.6 °F)   Resp 16   Ht 1.702 m (5' 7\")   Wt (!) 125.2 kg (276 lb)   SpO2 96%   BMI 43.23 kg/m²      Physical Exam    Gen: AOx3; Head: NC AT; Eyes: PERRLA/EOM; Lungs: NLR; Cardiac: RR by periph pulse exam; left lower extremity: Normal range of motion of the left knee and ankle, no effusion no erythema no ecchymosis; left calf: Normal no edema no erythema no ecchymosis, no tenderness to palpation; neuro: N VID, nonantalgic gait, normal sensation to light touch       Assessment/Plan:     1. Strain of calf muscle, subsequent encounter  MMI    "

## 2019-09-13 NOTE — LETTER
[facility]  [address] - [place] [address]  Phone:  [phone] - Fax:  [phone]   [facility] Progress Report and Disability Certification  Date of Service: 9/13/2019   No Show:  No  Date / Time of Next Visit:     Claim Information   Patient Name: Mingo Christiansen  Claim Number:     Employer: [employer]  Date of Injury: 9/6/2019     Insurer / TPA: [employer]  ID / SSN:     Occupation:   Diagnosis: The encounter diagnosis was Strain of calf muscle, subsequent encounter.    Medical Information   Related to Industrial Injury?   Comments:likely, minimal mechanism, now resolved    Subjective Complaints:  DOI: 9/6/19. Patient is 54 year old male with left thigh pain after tying pallets while on knees. He developed left thigh pain at this time. He reports thigh and calf pain, both posterior. He denies knee pain or back pain.  Patient notes complete resolution of pain from last visit to today.  Denies specific injury besides kneeling as described above.  Denies knee pain.  Does have a historical surgery to left knee.  Notes used over-the-counter Tylenol and rest and has had 100% resolution of pain.  Patient would like to close case MMI at this time.   Objective Findings: Gen: AOx3; Head: NC AT; Eyes: PERRLA/EOM; Lungs: NLR; Cardiac: RR by periph pulse exam; left lower extremity: Normal range of motion of the left knee and ankle, no effusion no erythema no ecchymosis; left calf: Normal no edema no erythema no ecchymosis, no tenderness to palpation; neuro: N VID, nonantalgic gait, normal sensation to light touch   Pre-Existing Condition(s):     Assessment:   Condition Improved    Status: Discharged /  MMI  Permanent Disability:No    Plan:   Comments:MMI    Diagnostics:   Comments:US last visit was NEG     Comments:       Disability Information   Status: Released to Full Duty    From:     Through:   Restrictions are:     Physical Restrictions    Sitting:    Standing:    Stooping:    Bending:      Squatting:    Walking:    Climbing:    Pushing:      Pulling:    Other:    Reaching Above Shoulder (L):   Reaching Above Shoulder (R):       Reaching Below Shoulder (L):    Reaching Below Shoulder (R):      Not to exceed Weight Limits   Carrying(hrs):   Weight Limit(lb):   Lifting(hrs):   Weight  Limit(lb):     Comments: MMI    Repetitive Actions   Hands: i.e. Fine Manipulations from Grasping:     Feet: i.e. Operating Foot Controls:     Driving / Operate Machinery:     Physician Name: Gaston Romano P.A.-C. Physician Signature: GASTON Carmichael P.A.-C. e-Signature: Dr. Elliott Shrestha, Medical Director   Clinic Name / Location: [address]  [place] [address] Clinic Phone Number: Dept: [phone]   Appointment Time: 1:50 Pm Visit Start Time: 1:51 PM   Check-In Time:  1:48 Pm Visit Discharge Time: 2:58 PM   Original-Treating Physician or Chiropractor    Page 2-Insurer/TPA    Page 3-Employer    Page 4-Employee

## 2020-01-03 ENCOUNTER — OFFICE VISIT (OUTPATIENT)
Dept: URGENT CARE | Facility: PHYSICIAN GROUP | Age: 55
End: 2020-01-03

## 2020-01-03 VITALS
TEMPERATURE: 99 F | HEIGHT: 66 IN | HEART RATE: 101 BPM | OXYGEN SATURATION: 94 % | DIASTOLIC BLOOD PRESSURE: 80 MMHG | WEIGHT: 280 LBS | BODY MASS INDEX: 45 KG/M2 | SYSTOLIC BLOOD PRESSURE: 156 MMHG

## 2020-01-03 DIAGNOSIS — M25.50 ARTHRALGIA, UNSPECIFIED JOINT: ICD-10-CM

## 2020-01-03 PROCEDURE — 99214 OFFICE O/P EST MOD 30 MIN: CPT | Performed by: NURSE PRACTITIONER

## 2020-01-03 RX ORDER — SULFAMETHOXAZOLE AND TRIMETHOPRIM 800; 160 MG/1; MG/1
1 TABLET ORAL 2 TIMES DAILY
Qty: 14 TAB | Refills: 0 | Status: SHIPPED | OUTPATIENT
Start: 2020-01-03 | End: 2020-01-10

## 2020-01-03 RX ORDER — PREDNISONE 20 MG/1
TABLET ORAL
Qty: 15 TAB | Refills: 0 | Status: SHIPPED | OUTPATIENT
Start: 2020-01-03 | End: 2020-02-28

## 2020-01-03 ASSESSMENT — ENCOUNTER SYMPTOMS
TINGLING: 0
CARDIOVASCULAR NEGATIVE: 1
FOCAL WEAKNESS: 0
RESPIRATORY NEGATIVE: 1
SENSORY CHANGE: 0
CHILLS: 0
CONSTITUTIONAL NEGATIVE: 1
FEVER: 0
NEUROLOGICAL NEGATIVE: 1

## 2020-01-03 NOTE — PATIENT INSTRUCTIONS
Joint Pain  Joint pain, which is also called arthralgia, can be caused by many things. Joint pain often goes away when you follow your health care provider's instructions for relieving pain at home. However, joint pain can also be caused by conditions that require further treatment. Common causes of joint pain include:  · Bruising in the area of the joint.  · Overuse of the joint.  · Wear and tear on the joints that occur with aging (osteoarthritis).  · Various other forms of arthritis.  · A buildup of a crystal form of uric acid in the joint (gout).  · Infections of the joint (septic arthritis) or of the bone (osteomyelitis).  Your health care provider may recommend medicine to help with the pain. If your joint pain continues, additional tests may be needed to diagnose your condition.  Follow these instructions at home:  Watch your condition for any changes. Follow these instructions as directed to lessen the pain that you are feeling.  · Take medicines only as directed by your health care provider.  · Rest the affected area for as long as your health care provider says that you should. If directed to do so, raise the painful joint above the level of your heart while you are sitting or lying down.  · Do not do things that cause or worsen pain.  · If directed, apply ice to the painful area:  ¨ Put ice in a plastic bag.  ¨ Place a towel between your skin and the bag.  ¨ Leave the ice on for 20 minutes, 2-3 times per day.  · Wear an elastic bandage, splint, or sling as directed by your health care provider. Loosen the elastic bandage or splint if your fingers or toes become numb and tingle, or if they turn cold and blue.  · Begin exercising or stretching the affected area as directed by your health care provider. Ask your health care provider what types of exercise are safe for you.  · Keep all follow-up visits as directed by your health care provider. This is important.  Contact a health care provider if:  · Your  pain increases, and medicine does not help.  · Your joint pain does not improve within 3 days.  · You have increased bruising or swelling.  · You have a fever.  · You lose 10 lb (4.5 kg) or more without trying.  Get help right away if:  · You are not able to move the joint.  · Your fingers or toes become numb or they turn cold and blue.  This information is not intended to replace advice given to you by your health care provider. Make sure you discuss any questions you have with your health care provider.  Document Released: 12/18/2006 Document Revised: 05/19/2017 Document Reviewed: 09/29/2015  [employer] Interactive Patient Education © 2017 [employer]

## 2020-01-03 NOTE — PROGRESS NOTES
Subjective:     Mingo Christiansen is a 54 y.o. male who presents for Temporomandibular Joint Pain (x1 month. Pain in bilateral shoulders, elbow, wrists, knee.)       Other   This is a new problem. The problem has been gradually worsening. Pertinent negatives include no chills or fever.     Patient presents urgent care with reports of multiple joint pain x1 month.  Denies acute injury.  Reports that he had to quit his job due to the pain.  Has not been working ever since.  Reports that pain started off in the shoulders and eventually made its way to the elbows, wrists, knees, and his feet.  Prior therapy: OTC naproxen which helped with the pain.  Once he stopped the medication, the pain came back.  Reports a history of gout but the symptoms feel different.  Patient concerned of potential infection.    PMH:  has a past medical history of Asthma, Gout, and Hypertension.    MEDS:   Current Outpatient Medications:   •  sulfamethoxazole-trimethoprim (BACTRIM DS) 800-160 MG tablet, Take 1 Tab by mouth 2 times a day for 7 days., Disp: 14 Tab, Rfl: 0  •  predniSONE (DELTASONE) 20 MG Tab, Take 3 tabs daily x 3 days, then 2 tabs daily x 2 days, then 1 tab x 2 days., Disp: 15 Tab, Rfl: 0  •  carvedilol (COREG) 6.25 MG Tab, Take 1 Tab by mouth 2 times a day, with meals., Disp: 180 Tab, Rfl: 3  •  naproxen (NAPROSYN) 500 MG Tab, Take 1 Tab by mouth 2 times a day, with meals., Disp: 60 Tab, Rfl: 0  •  allopurinol (ZYLOPRIM) 300 MG Tab, Take 300 mg by mouth every day., Disp: , Rfl:   •  therapeutic multivitamin-minerals (THERAGRAN-M) Tab, Take 2 Tabs by mouth every day., Disp: , Rfl:   •  ketoconazole (NIZORAL) 2 % Cream, , Disp: , Rfl:   •  tacrolimus (PROTOPIC) 0.1 % Ointment, , Disp: , Rfl:   •  mupirocin (BACTROBAN) 2 % Ointment, , Disp: , Rfl:   •  Coenzyme Q10 (CO Q 10 PO), Take  by mouth., Disp: , Rfl:   •  albuterol (VENTOLIN OR PROVENTIL) 108 (90 BASE) MCG/ACT Aero Soln inhalation aerosol, Inhale 2 Puffs by mouth  "every 6 hours as needed for Shortness of Breath., Disp: , Rfl:   •  fluticasone-salmeterol (ADVAIR HFA) 115-21 MCG/ACT inhaler, Inhale 2 Puffs by mouth 2 times a day., Disp: , Rfl:     ALLERGIES:   Allergies   Allergen Reactions   • Erythromycin Unspecified     Pt states \"Years ago he had a high temp\".     SURGHX:   Past Surgical History:   Procedure Laterality Date   • OTHER ORTHOPEDIC SURGERY  1995    L Knee     SOCHX:  reports that he has never smoked. He has never used smokeless tobacco. He reports current alcohol use. He reports that he does not use drugs.     FH: Reviewed with patient, not pertinent to this visit.    Review of Systems   Constitutional: Negative.  Negative for chills, fever and malaise/fatigue.   HENT: Negative.    Respiratory: Negative.    Cardiovascular: Negative.    Musculoskeletal: Positive for joint pain.   Neurological: Negative.  Negative for tingling, sensory change and focal weakness.   All other systems reviewed and are negative.    Objective:     /80   Pulse (!) 101   Temp 37.2 °C (99 °F)   Ht 1.676 m (5' 6\")   Wt (!) 127 kg (280 lb)   SpO2 94%   BMI 45.19 kg/m²     Physical Exam  Vitals signs reviewed.   Constitutional:       General: He is not in acute distress.     Appearance: He is well-developed. He is not toxic-appearing or diaphoretic.   HENT:      Head: Normocephalic.      Right Ear: External ear normal.      Left Ear: External ear normal.      Nose: Nose normal.   Eyes:      Extraocular Movements: Extraocular movements intact.      Conjunctiva/sclera: Conjunctivae normal.   Neck:      Musculoskeletal: Normal range of motion.   Cardiovascular:      Rate and Rhythm: Normal rate.      Pulses: Normal pulses.   Pulmonary:      Effort: Pulmonary effort is normal. No respiratory distress.   Musculoskeletal: Normal range of motion.         General: No swelling, tenderness or deformity.      Right lower leg: No edema.      Left lower leg: No edema.   Skin:     General: " Skin is warm and dry.      Capillary Refill: Capillary refill takes less than 2 seconds.      Coloration: Skin is not pale.   Neurological:      General: No focal deficit present.      Mental Status: He is alert and oriented to person, place, and time.      Sensory: No sensory deficit.      Coordination: Coordination normal.      Gait: Gait normal.   Psychiatric:         Behavior: Behavior normal. Behavior is cooperative.          Assessment/Plan:     1. Arthralgia, unspecified joint  - sulfamethoxazole-trimethoprim (BACTRIM DS) 800-160 MG tablet; Take 1 Tab by mouth 2 times a day for 7 days.  Dispense: 14 Tab; Refill: 0  - predniSONE (DELTASONE) 20 MG Tab; Take 3 tabs daily x 3 days, then 2 tabs daily x 2 days, then 1 tab x 2 days.  Dispense: 15 Tab; Refill: 0  - REFERRAL TO FAMILY PRACTICE    Unclear etiology.  Various differentials of symptoms discussed.    Rx as above sent electronically. Advised to avoid NSAIDs while on steroid therapy.    May use ice and gentle ROM exercises.    Referral for family practice follow-up.    Vital signs stable, afebrile, asymptomatic, no acute distress.    Warning signs reviewed. Return precautions discussed.    Patient advised to: Return for 1) Symptoms don't improve or worsen, or go to ER, 2) Follow up with primary care in 7-10 days.    Differential diagnosis, natural history, supportive care, and indications for immediate follow-up discussed. All questions answered. Patient agrees with the plan of care.

## 2020-01-10 ENCOUNTER — TELEPHONE (OUTPATIENT)
Dept: SCHEDULING | Facility: IMAGING CENTER | Age: 55
End: 2020-01-10

## 2020-01-13 ENCOUNTER — OFFICE VISIT (OUTPATIENT)
Dept: MEDICAL GROUP | Facility: PHYSICIAN GROUP | Age: 55
End: 2020-01-13

## 2020-01-13 ENCOUNTER — HOSPITAL ENCOUNTER (OUTPATIENT)
Dept: LAB | Facility: MEDICAL CENTER | Age: 55
End: 2020-01-13
Attending: FAMILY MEDICINE

## 2020-01-13 VITALS
WEIGHT: 272.4 LBS | BODY MASS INDEX: 43.78 KG/M2 | HEART RATE: 90 BPM | SYSTOLIC BLOOD PRESSURE: 158 MMHG | HEIGHT: 66 IN | DIASTOLIC BLOOD PRESSURE: 86 MMHG | RESPIRATION RATE: 16 BRPM | OXYGEN SATURATION: 94 % | TEMPERATURE: 98.2 F

## 2020-01-13 DIAGNOSIS — M25.50 GENERALIZED JOINT PAIN: ICD-10-CM

## 2020-01-13 LAB
ANION GAP SERPL CALC-SCNC: 10 MMOL/L (ref 0–11.9)
BUN SERPL-MCNC: 15 MG/DL (ref 8–22)
CALCIUM SERPL-MCNC: 9.1 MG/DL (ref 8.5–10.5)
CHLORIDE SERPL-SCNC: 104 MMOL/L (ref 96–112)
CO2 SERPL-SCNC: 27 MMOL/L (ref 20–33)
CREAT SERPL-MCNC: 0.92 MG/DL (ref 0.5–1.4)
ERYTHROCYTE [DISTWIDTH] IN BLOOD BY AUTOMATED COUNT: 48.6 FL (ref 35.9–50)
ERYTHROCYTE [SEDIMENTATION RATE] IN BLOOD BY WESTERGREN METHOD: 9 MM/HOUR (ref 0–20)
FASTING STATUS PATIENT QL REPORTED: NORMAL
GLUCOSE SERPL-MCNC: 102 MG/DL (ref 65–99)
HCT VFR BLD AUTO: 47.1 % (ref 42–52)
HGB BLD-MCNC: 15.5 G/DL (ref 14–18)
MCH RBC QN AUTO: 32.6 PG (ref 27–33)
MCHC RBC AUTO-ENTMCNC: 32.9 G/DL (ref 33.7–35.3)
MCV RBC AUTO: 99.2 FL (ref 81.4–97.8)
PLATELET # BLD AUTO: 241 K/UL (ref 164–446)
PMV BLD AUTO: 10 FL (ref 9–12.9)
POTASSIUM SERPL-SCNC: 4.2 MMOL/L (ref 3.6–5.5)
RBC # BLD AUTO: 4.75 M/UL (ref 4.7–6.1)
RHEUMATOID FACT SER IA-ACNC: <10 IU/ML (ref 0–14)
SODIUM SERPL-SCNC: 141 MMOL/L (ref 135–145)
URATE SERPL-MCNC: 6.2 MG/DL (ref 2.5–8.3)
WBC # BLD AUTO: 8.3 K/UL (ref 4.8–10.8)

## 2020-01-13 PROCEDURE — 86038 ANTINUCLEAR ANTIBODIES: CPT

## 2020-01-13 PROCEDURE — 86431 RHEUMATOID FACTOR QUANT: CPT

## 2020-01-13 PROCEDURE — 84550 ASSAY OF BLOOD/URIC ACID: CPT

## 2020-01-13 PROCEDURE — 85652 RBC SED RATE AUTOMATED: CPT

## 2020-01-13 PROCEDURE — 99204 OFFICE O/P NEW MOD 45 MIN: CPT | Performed by: FAMILY MEDICINE

## 2020-01-13 PROCEDURE — 86039 ANTINUCLEAR ANTIBODIES (ANA): CPT

## 2020-01-13 PROCEDURE — 80048 BASIC METABOLIC PNL TOTAL CA: CPT

## 2020-01-13 PROCEDURE — 85027 COMPLETE CBC AUTOMATED: CPT

## 2020-01-13 PROCEDURE — 36415 COLL VENOUS BLD VENIPUNCTURE: CPT

## 2020-01-13 PROCEDURE — 86200 CCP ANTIBODY: CPT

## 2020-01-13 RX ORDER — MELOXICAM 7.5 MG/1
7.5 TABLET ORAL DAILY
Qty: 30 TAB | Refills: 0 | Status: SHIPPED | OUTPATIENT
Start: 2020-01-13 | End: 2020-02-05

## 2020-01-13 RX ORDER — ALLOPURINOL 300 MG/1
300 TABLET ORAL DAILY
Qty: 30 TAB | Refills: 3 | Status: SHIPPED | OUTPATIENT
Start: 2020-01-13 | End: 2020-07-06

## 2020-01-13 ASSESSMENT — PATIENT HEALTH QUESTIONNAIRE - PHQ9: CLINICAL INTERPRETATION OF PHQ2 SCORE: 0

## 2020-01-13 NOTE — ASSESSMENT & PLAN NOTE
This is a new condition.  Onset: Started developing generalized joint pain over the past 2 months.   Location: He describes pain localized to: started left elbow. Then moved to left fingers, then right knee, and right elbow, and shoulders and right thumb.   Duration: Pain is ongoing   Severity:  Up to 10/10  Quality: Describes as sharp  Modifying factors: Went to  on 1/3/20 - and was given Bactrim + Prednisone. Felt better and now returned. He has a hx of gout and takes allopurinol. He usually gets right podagra. Denies any flares.   Precipitating trauma: None.   Associated symptoms: Denies any fever or chills.   Home treatments: Naproxen in the past.

## 2020-01-13 NOTE — PROGRESS NOTES
Subjective:     CC:  The encounter diagnosis was Generalized joint pain.    HISTORY OF THE PRESENT ILLNESS: Patient is a 54 y.o. male. This pleasant patient is here today to establish care and discuss the following problems.   Prior PCP: Eusebio Remy     Generalized joint pain  This is a new condition.  Onset: Started developing generalized joint pain over the past 2 months.   Location: He describes pain localized to: started left elbow. Then moved to left fingers, then right knee, and right elbow, and shoulders and right thumb.   Duration: Pain is ongoing   Severity:  Up to 10/10  Quality: Describes as sharp  Modifying factors: Went to  on 1/3/20 - and was given Bactrim + Prednisone. Felt better and now returned. He has a hx of gout and takes allopurinol. He usually gets right podagra. Denies any flares.   Precipitating trauma: None.   Associated symptoms: Denies any fever or chills.   Home treatments: Naproxen in the past.       Allergies: Erythromycin    Current Outpatient Medications Ordered in Epic   Medication Sig Dispense Refill   • meloxicam (MOBIC) 7.5 MG Tab Take 1 Tab by mouth every day. 30 Tab 0   • allopurinol (ZYLOPRIM) 300 MG Tab Take 1 Tab by mouth every day. 30 Tab 3   • carvedilol (COREG) 6.25 MG Tab Take 1 Tab by mouth 2 times a day, with meals. 180 Tab 3   • albuterol (VENTOLIN OR PROVENTIL) 108 (90 BASE) MCG/ACT Aero Soln inhalation aerosol Inhale 2 Puffs by mouth every 6 hours as needed for Shortness of Breath.     • fluticasone-salmeterol (ADVAIR HFA) 115-21 MCG/ACT inhaler Inhale 2 Puffs by mouth 2 times a day.     • predniSONE (DELTASONE) 20 MG Tab Take 3 tabs daily x 3 days, then 2 tabs daily x 2 days, then 1 tab x 2 days. (Patient not taking: Reported on 1/13/2020) 15 Tab 0     No current Epic-ordered facility-administered medications on file.        Past Medical History:   Diagnosis Date   • Asthma    • Gout    • Hypertension        Past Surgical History:   Procedure Laterality  "Date   • OTHER ORTHOPEDIC SURGERY  1995    L Knee       Social History     Tobacco Use   • Smoking status: Never Smoker   • Smokeless tobacco: Never Used   Substance Use Topics   • Alcohol use: Not Currently   • Drug use: Never       Social History     Patient does not qualify to have social determinant information on file (likely too young).   Social History Narrative   • Not on file       Family History   Problem Relation Age of Onset   • Arthritis Father        Health Maintenance: Completed    ROS:   Gen: no fevers/chills, no changes in weight  Eyes: no changes in vision  ENT: no sore throat, no hearing loss, no bloody nose  Pulm: no sob, no cough  CV: no chest pain, no palpitations  GI: no nausea/vomiting, no diarrhea  : no dysuria  MSk: no myalgias  Skin: no rash  Neuro: no headaches, no numbness/tingling  Heme/Lymph: no easy bruising      Objective:     Exam: /86 (BP Location: Left arm, Patient Position: Sitting, BP Cuff Size: Adult)   Pulse 90   Temp 36.8 °C (98.2 °F) (Temporal)   Resp 16   Ht 1.676 m (5' 6\")   Wt 123.6 kg (272 lb 6.4 oz)   SpO2 94%  Body mass index is 43.97 kg/m².    General: Normal appearing. No distress.  HENT: Normocephalic. Ears normal shape and contour, canals are clear bilaterally, tympanic membranes are benign, nasal mucosa benign, oropharynx is without erythema, edema or exudates.   Eyes: Conjunctiva clear lids without ptosis, pupils equal and reactive to light accommodation.  Neck: Supple without JVD. Thyroid is not enlarged.  Pulmonary: Clear to ausculation.  Normal effort. No rales, ronchi, or wheezing.  Cardiovascular: Regular rate and rhythm without murmur. Radial pulses are intact and equal bilaterally.  Abdomen: Soft, nontender, nondistended. Normal bowel sounds. Liver and spleen are not palpable  Neurologic: Moves all extremities  Lymph: No cervical or supraclavicular lymph nodes are palpable  Skin: Warm and dry.  No obvious lesions.  Musculoskeletal: Normal " gait. No extremity cyanosis, clubbing, or edema.  Psych: Normal mood and affect. Alert and oriented x3.     Assessment & Plan:   54 y.o. male with the following -    1. Generalized joint pain  This is a new problem.  The patient presents today reporting persistent generalized joint pain over the past 2 months.  Specifically, he endorses pain to his elbows shoulders phalangeal joints and knees.  He had to go to urgent care on 1/3/2020 for this and was given a trial of prednisone and Bactrim.  He did feel better while on prednisone.  Etiology is uncertain at this time.  I do consider the possibility of an autoimmune problem.  As such I will order blood tests screening for this.  I will also check uric acid to make sure this is not related to gout.  Discontinue naproxen and do a trial of meloxicam instead.  I will consider referral to rheumatology if he test positive for autoimmune markers.  Otherwise, I will consider referral to physiatry.  Patient is agreeable to plan.  - GOGO REFLEXIVE PROFILE; Future  - WESTERGREN SED RATE; Future  - RHEUMATOID ARTHRITIS FACTOR; Future  - CCP-CYCLIC CITRULLINATED PEPTID; Future  - Basic Metabolic Panel; Future  - CBC WITHOUT DIFFERENTIAL; Future  - URIC ACID; Future  - meloxicam (MOBIC) 7.5 MG Tab; Take 1 Tab by mouth every day.  Dispense: 30 Tab; Refill: 0  - allopurinol (ZYLOPRIM) 300 MG Tab; Take 1 Tab by mouth every day.  Dispense: 30 Tab; Refill: 3    Return in about 3 months (around 4/13/2020).    Please note that this dictation was created using voice recognition software. I have made every reasonable attempt to correct obvious errors, but I expect that there are errors of grammar and possibly content that I did not discover before finalizing the note.

## 2020-01-14 LAB
CCP IGG SERPL-ACNC: 5 UNITS (ref 0–19)
NUCLEAR IGG SER QL IA: DETECTED

## 2020-01-16 LAB
ANA INTERPRETIVE COMMENT Q5143: NORMAL
ANTINUCLEAR ANTIBODY (ANA), HEP-2, IGG Q5142: NORMAL

## 2020-01-17 ENCOUNTER — TELEPHONE (OUTPATIENT)
Dept: MEDICAL GROUP | Facility: PHYSICIAN GROUP | Age: 55
End: 2020-01-17

## 2020-01-17 DIAGNOSIS — M25.50 GENERALIZED JOINT PAIN: ICD-10-CM

## 2020-02-05 DIAGNOSIS — M25.50 GENERALIZED JOINT PAIN: ICD-10-CM

## 2020-02-06 DIAGNOSIS — I10 ESSENTIAL HYPERTENSION: Primary | ICD-10-CM

## 2020-02-06 RX ORDER — MELOXICAM 7.5 MG/1
TABLET ORAL
Qty: 30 TAB | Refills: 0 | Status: SHIPPED
Start: 2020-02-06 | End: 2020-02-21

## 2020-02-06 RX ORDER — CARVEDILOL 6.25 MG/1
6.25 TABLET ORAL 2 TIMES DAILY WITH MEALS
Qty: 180 TAB | Refills: 0 | Status: SHIPPED | OUTPATIENT
Start: 2020-02-06 | End: 2020-02-28 | Stop reason: SDUPTHER

## 2020-02-21 ENCOUNTER — OFFICE VISIT (OUTPATIENT)
Dept: PHYSICAL MEDICINE AND REHAB | Facility: MEDICAL CENTER | Age: 55
End: 2020-02-21

## 2020-02-21 VITALS
OXYGEN SATURATION: 91 % | BODY MASS INDEX: 44.15 KG/M2 | TEMPERATURE: 98.1 F | HEIGHT: 66 IN | WEIGHT: 274.69 LBS | SYSTOLIC BLOOD PRESSURE: 142 MMHG | HEART RATE: 83 BPM | DIASTOLIC BLOOD PRESSURE: 86 MMHG

## 2020-02-21 DIAGNOSIS — M25.50 POLYARTHRALGIA: ICD-10-CM

## 2020-02-21 DIAGNOSIS — M79.10 MYALGIA: ICD-10-CM

## 2020-02-21 PROCEDURE — 99205 OFFICE O/P NEW HI 60 MIN: CPT | Performed by: PHYSICAL MEDICINE & REHABILITATION

## 2020-02-21 RX ORDER — NAPROXEN 375 MG/1
375 TABLET ORAL 2 TIMES DAILY WITH MEALS
Qty: 60 TAB | Refills: 0 | Status: SHIPPED | OUTPATIENT
Start: 2020-02-21 | End: 2020-04-07

## 2020-02-21 RX ORDER — OMEPRAZOLE 20 MG/1
20 CAPSULE, DELAYED RELEASE ORAL DAILY
Qty: 30 CAP | Refills: 1 | Status: SHIPPED | OUTPATIENT
Start: 2020-02-21 | End: 2020-03-22

## 2020-02-21 ASSESSMENT — PATIENT HEALTH QUESTIONNAIRE - PHQ9
SUM OF ALL RESPONSES TO PHQ QUESTIONS 1-9: 4
CLINICAL INTERPRETATION OF PHQ2 SCORE: 2
5. POOR APPETITE OR OVEREATING: 0 - NOT AT ALL

## 2020-02-21 ASSESSMENT — PAIN SCALES - GENERAL: PAINLEVEL: 6=MODERATE PAIN

## 2020-02-21 NOTE — PROGRESS NOTES
New patient note    Physiatry (physical medicine and  Rehabilitation), interventional spine and sports medicine    Date of Service: 02/21/2020    Chief complaint: Polyarthralgias    HISTORY    HPI: Mingo Christiansen 54 y.o. male who presents today for evaluation of polyarthralgias.  Symptoms started November 2019 without any particular cause.  This seemed to started with left elbow pain that seemed to be aching and associated with weakness.      After a few days, he started to have right knee pain, then right elbow pain and fingers, shoulders and thumbs.  He feels like his muscles tighten and he can feel it down the spine.      He is concerned that this started after his flu shot in October 2019.      Then, January 3, 2020 he presented to urgent care and was given bactrim and prednisone.  He did not have swelling or redness or fever. This did seem to help and as he weaned off the prednisone, he could feel the pain returning.  He reports that he has not been sexually active, has no history of remote blood transfusion.    Currently, he is taking meloxicam 7.5mg and this seems to help.  Initially, he took naprosyn.  This seemed to help more.  Ibuprofen did not help much.    Heat and ice are not helpful.  Movement does not particularly worsen pain.  Sleep is more difficult due to pain.  No numbness or tingling.      He feels like he gets weak if he stops the NSAIDs.  He reports that he is not sexually active.    Mingo reports that he had a knee injury in 1995 and had surgery.  In September 2019, this flared.  He reports that this is no longer a worker's compensation claim per the patient.  He has a chronic left foot drop after this and reports that he has not tolerated use of a brace in the past.       Medical records review:  I reviewed the note from the referring provider Candelario Miles.      Previous treatments:    Physical Therapy: No    Medications the patient is tried: NSAIDs, steroids    Previous  "interventions: none     Previous surgeries to relieve the above pain:  none      ROS:   Red Flags ROS:   Fever, Chills, Sweats: Denies  Involuntary Weight Loss: Denies  Bladder Incontinence: Denies  Bowel Incontinence: Denies  Saddle Anesthesia: Denies    All other systems reviewed and negative.       PMHx:   Past Medical History:   Diagnosis Date   • Asthma    • Gout    • Hypertension        PSHx:   Past Surgical History:   Procedure Laterality Date   • OTHER ORTHOPEDIC SURGERY  1995    L Knee       Family history   Family History   Problem Relation Age of Onset   • Arthritis Father          Medications:   Current Outpatient Medications   Medication   • naproxen (NAPROSYN) 375 MG Tab   • omeprazole (PRILOSEC) 20 MG delayed-release capsule   • carvedilol (COREG) 6.25 MG Tab   • allopurinol (ZYLOPRIM) 300 MG Tab   • albuterol (VENTOLIN OR PROVENTIL) 108 (90 BASE) MCG/ACT Aero Soln inhalation aerosol   • fluticasone-salmeterol (ADVAIR HFA) 115-21 MCG/ACT inhaler   • predniSONE (DELTASONE) 20 MG Tab     No current facility-administered medications for this visit.        Allergies:   Allergies   Allergen Reactions   • Erythromycin Unspecified     Pt states \"Years ago he had a high temp\".       Social Hx:   Social History     Socioeconomic History   • Marital status: Single     Spouse name: Not on file   • Number of children: Not on file   • Years of education: Not on file   • Highest education level: Not on file   Occupational History   • Not on file   Social Needs   • Financial resource strain: Not on file   • Food insecurity     Worry: Not on file     Inability: Not on file   • Transportation needs     Medical: Not on file     Non-medical: Not on file   Tobacco Use   • Smoking status: Never Smoker   • Smokeless tobacco: Never Used   Substance and Sexual Activity   • Alcohol use: Not Currently   • Drug use: Never   • Sexual activity: Not on file   Lifestyle   • Physical activity     Days per week: Not on file     " "Minutes per session: Not on file   • Stress: Not on file   Relationships   • Social connections     Talks on phone: Not on file     Gets together: Not on file     Attends Shinto service: Not on file     Active member of club or organization: Not on file     Attends meetings of clubs or organizations: Not on file     Relationship status: Not on file   • Intimate partner violence     Fear of current or ex partner: Not on file     Emotionally abused: Not on file     Physically abused: Not on file     Forced sexual activity: Not on file   Other Topics Concern   •  Service No   • Blood Transfusions No   • Caffeine Concern No   • Occupational Exposure No   • Hobby Hazards No   • Sleep Concern No   • Stress Concern No   • Weight Concern No   • Special Diet No   • Back Care No   • Exercise No   • Bike Helmet Yes   • Seat Belt Yes   • Self-Exams No   Social History Narrative   • Not on file         EXAMINATION     Physical Exam:   Vitals: /86 (BP Location: Right arm, Patient Position: Sitting, BP Cuff Size: Large adult long)   Pulse 83   Temp 36.7 °C (98.1 °F) (Temporal)   Ht 1.676 m (5' 6\")   Wt 124.6 kg (274 lb 11.1 oz)   SpO2 91%     Constitutional:   Body Habitus: Body mass index is 44.34 kg/m².  Cooperation: Fully cooperates with exam  Appearance: Well-groomed, well-nourished, not disheveled, no acute distress    Eyes: No scleral icterus, no proptosis     ENT -no obvious auditory deficits, no obvious tongue lesions, tongue midline, no facial droop     Skin -no rashes or lesions noted     Respiratory-  breathing comfortable on room air, no audible wheezing    Cardiovascular- capillary refills less than 2 seconds. No lower extremity edema is noted.     Gastrointestinal - no obvious abdominal masses, No tenderness to palpation in the abdomen    Psychiatric- alert and oriented ×3. Normal affect.     Gait - Left foot drop, no use of ambulatory device, nonantalgic    Musculoskeletal -   Cervical spine "   Inspection: No deformities of the skin over the cervical spine. No rashes or lesions.    full  A/P ROM in all directions, without  pain      Spurling’s sign: negative bilaterally    No signs of muscular atrophy in bilateral upper extremities     No tenderness to palpation of the cervical facets    Thoracic/Lumbar Spine/Sacral Spine/Hips   Inspection: No evidence of atrophy in bilateral lower extremities throughout     ROM: full  AROM with flexion, extension, lateral flexion, and rotation bilaterally, without pain     Palpation:   No tenderness to palpation in midline at T1-T12 levels. No tenderness to palpation in the left and right of the midline T1-L5  palpation over SI joint: negative bilaterally    palpation over buttock: negative bilaterally    palpation in hip or over the greater trochanters: negative bilaterally      Lumbar spine Special tests  Neuro tension  Seated straight leg test negative bilaterally        HIP  Range of motion in the hips is within normal limits in internal and external rotation bilaterally    SI joint tests  Observation patient sits on one buttocks: Negative   LONG test negative bilaterally       Neuro       Key points for the international standards for neurological classification of spinal cord injury (ISNCSCI) to light touch.     Dermatome R L   C4 2 2   C5 2 2   C6 2 2   C7 2 2   C8 2 2   T1 2 2   T2 2 2   L2 2 2   L3 2 2   L4 2 2   L5 2 2   S1 2 2   S2 2 2           Motor Exam Upper Extremities   ? Myotome R L   Shoulder flexion C5 5 5   Elbow flexion C5 5 5   Wrist extension C6 5 5   Elbow extension C7 5 5   Finger flexion C8 5 5   Finger abduction T1 5 5         Motor Exam Lower Extremities    ? Myotome R L   Hip flexion L2 5 5   Knee extension L3 5 5   Ankle dorsiflexion L4 5 1   Toe extension L5 5 0   Ankle plantarflexion S1 5 3       Alamo’s sign negative bilaterally   Babinski sign negative bilaterally   Clonus of the ankle negative bilaterally     Reflexes  ?  R L    Biceps  3+ 3+   Brachioradialis  3+ 3+   Patella  3+ 1+   Achilles   3+ 1+       MEDICAL DECISION MAKING    Medical records review: see under HPI section.     DATA    Labs:   01/13/2020: ESR 9  01/13/2020: <10  Lab Results   Component Value Date/Time    SODIUM 141 01/13/2020 09:08 AM    POTASSIUM 4.2 01/13/2020 09:08 AM    CHLORIDE 104 01/13/2020 09:08 AM    CO2 27 01/13/2020 09:08 AM    ANION 10.0 01/13/2020 09:08 AM    GLUCOSE 102 (H) 01/13/2020 09:08 AM    BUN 15 01/13/2020 09:08 AM    CREATININE 0.92 01/13/2020 09:08 AM    CALCIUM 9.1 01/13/2020 09:08 AM    ASTSGOT 22 02/15/2016 08:05 AM    ALTSGPT 26 02/15/2016 08:05 AM    TBILIRUBIN 0.3 02/15/2016 08:05 AM    ALBUMIN 4.6 02/15/2016 08:05 AM    TOTPROTEIN 7.5 02/15/2016 08:05 AM    GLOBULIN 2.9 02/15/2016 08:05 AM    AGRATIO 1.6 02/15/2016 08:05 AM     No results found for: PROTHROMBTM, INR     Lab Results   Component Value Date/Time    WBC 8.3 01/13/2020 09:08 AM    RBC 4.75 01/13/2020 09:08 AM    HEMOGLOBIN 15.5 01/13/2020 09:08 AM    HEMATOCRIT 47.1 01/13/2020 09:08 AM    MCV 99.2 (H) 01/13/2020 09:08 AM    MCH 32.6 01/13/2020 09:08 AM    MCHC 32.9 (L) 01/13/2020 09:08 AM    MPV 10.0 01/13/2020 09:08 AM    NEUTSPOLYS 62.80 02/15/2016 08:05 AM    LYMPHOCYTES 26.10 02/15/2016 08:05 AM    MONOCYTES 7.30 02/15/2016 08:05 AM    EOSINOPHILS 3.20 02/15/2016 08:05 AM    BASOPHILS 0.40 02/15/2016 08:05 AM        Lab Results   Component Value Date/Time    HBA1C 5.6 11/18/2015 08:14 AM        Imaging: I personally reviewed following images, these are my reads  Xrays bilateral knees 06/15/2017  Moderate osteoarthritis of the left knee with postoperative changes.  Mild osteoarthritis of the right knee.      IMAGING radiology reads. I reviewed the following radiology reads                                                    Results for orders placed in visit on 06/15/17   DX-KNEES-AP BILATERAL STANDING    Impression 1.  No acute fracture or dislocation.    2.  Moderate  osteoarthritis and postoperative changes in the left knee.    3.  Mild osteoarthritis of the right knee.      Results for orders placed in visit on 06/15/17   DX-KNEE COMPLETE 4+ LEFT    Impression No evidence of fracture or dislocation.  There are degenerative changes and postoperative changes as described above.                                             Diagnosis   Visit Diagnoses     ICD-10-CM   1. Polyarthralgia M25.50   2. Myalgia M79.10           ASSESSMENT:  Mingo Christiansen 54 y.o. male with complaint of polyarthralgias     Mingo was seen today for new patient.    Diagnoses and all orders for this visit:    Polyarthralgia  -     Sed Rate; Future  -     CRP QUANTITIVE (NON-CARDIAC); Future  -     HEPATITIS PANEL ACUTE(4 COMPONENTS); Future  -     naproxen (NAPROSYN) 375 MG Tab; Take 1 Tab by mouth 2 times a day, with meals for 30 days.  -     omeprazole (PRILOSEC) 20 MG delayed-release capsule; Take 1 Cap by mouth every day for 30 days.  -     REFERRAL TO RHEUMATOLOGY    Myalgia  -     Sed Rate; Future  -     CRP QUANTITIVE (NON-CARDIAC); Future  -     REFERRAL TO RHEUMATOLOGY        1. Discussed checking hepatitis panel.  He denies any history of sexual activity.  HIV, gonorrhea were considered, but hold off testing given low chance with his sexual history and no history of IVDA  2. Recheck sed rate and CRP  3. He has done better with napoxen.  Script given with omeprazole for prophylaxis.  Discontinue meloxicam  4. Recommend referral to Rheumatology.  No focal joint pain on exam and no evidence of synovitis on exam today.        Follow-up: Return in about 6 weeks (around 4/3/2020).    Thank you very much for asking me to participate in Mingo Christiansen's care.  Please contact me with any questions or concerns.      Please note that this dictation was created using voice recognition software. I have made every reasonable attempt to correct obvious errors but there may be errors of grammar and  content that I may have overlooked prior to finalization of this note.      Sim Martinez MD  Physical Medicine and Rehabilitation  Interventional Spine and Sports Physiatry  [facility]

## 2020-02-26 ENCOUNTER — HOSPITAL ENCOUNTER (OUTPATIENT)
Dept: LAB | Facility: MEDICAL CENTER | Age: 55
End: 2020-02-26
Attending: PHYSICAL MEDICINE & REHABILITATION

## 2020-02-26 DIAGNOSIS — M79.10 MYALGIA: ICD-10-CM

## 2020-02-26 DIAGNOSIS — M25.50 POLYARTHRALGIA: ICD-10-CM

## 2020-02-26 LAB
CRP SERPL HS-MCNC: 0.43 MG/DL (ref 0–0.75)
ERYTHROCYTE [SEDIMENTATION RATE] IN BLOOD BY WESTERGREN METHOD: 14 MM/HOUR (ref 0–20)

## 2020-02-26 PROCEDURE — 80074 ACUTE HEPATITIS PANEL: CPT

## 2020-02-26 PROCEDURE — 85652 RBC SED RATE AUTOMATED: CPT

## 2020-02-26 PROCEDURE — 36415 COLL VENOUS BLD VENIPUNCTURE: CPT

## 2020-02-26 PROCEDURE — 86140 C-REACTIVE PROTEIN: CPT

## 2020-02-27 LAB
HAV IGM SERPL QL IA: NEGATIVE
HBV CORE IGM SER QL: NEGATIVE
HBV SURFACE AG SER QL: NEGATIVE
HCV AB SER QL: NEGATIVE

## 2020-02-28 ENCOUNTER — OFFICE VISIT (OUTPATIENT)
Dept: CARDIOLOGY | Facility: MEDICAL CENTER | Age: 55
End: 2020-02-28

## 2020-02-28 VITALS
HEIGHT: 66 IN | HEART RATE: 100 BPM | SYSTOLIC BLOOD PRESSURE: 138 MMHG | DIASTOLIC BLOOD PRESSURE: 80 MMHG | OXYGEN SATURATION: 92 % | BODY MASS INDEX: 43.39 KG/M2 | WEIGHT: 270 LBS

## 2020-02-28 DIAGNOSIS — M79.10 MYALGIA: ICD-10-CM

## 2020-02-28 DIAGNOSIS — I10 ESSENTIAL HYPERTENSION: ICD-10-CM

## 2020-02-28 DIAGNOSIS — Z79.899 ENCOUNTER FOR LONG-TERM (CURRENT) USE OF HIGH-RISK MEDICATION: ICD-10-CM

## 2020-02-28 DIAGNOSIS — R53.83 FATIGUE, UNSPECIFIED TYPE: ICD-10-CM

## 2020-02-28 DIAGNOSIS — G47.33 OSA (OBSTRUCTIVE SLEEP APNEA): ICD-10-CM

## 2020-02-28 PROCEDURE — 99215 OFFICE O/P EST HI 40 MIN: CPT | Performed by: INTERNAL MEDICINE

## 2020-02-28 RX ORDER — CARVEDILOL 12.5 MG/1
12.5 TABLET ORAL 2 TIMES DAILY WITH MEALS
Qty: 180 TAB | Refills: 3 | Status: SHIPPED | OUTPATIENT
Start: 2020-02-28 | End: 2021-03-11 | Stop reason: SDUPTHER

## 2020-02-28 ASSESSMENT — ENCOUNTER SYMPTOMS
MYALGIAS: 1
PALPITATIONS: 0
DEPRESSION: 0
ORTHOPNEA: 0
PND: 0
LOSS OF CONSCIOUSNESS: 0
ABDOMINAL PAIN: 0
SHORTNESS OF BREATH: 0
FALLS: 0
DIZZINESS: 0

## 2020-02-28 NOTE — LETTER
[facility]   [address], [address]  [place] [address]  Phone: [phone]  Fax: [phone]              Mingo Christiansen  1965    Encounter Date: 2/28/2020    Madeline Iraheta M.D.          PROGRESS NOTE:  Chief Complaint   Patient presents with   • Chest Pain   • HTN (Controlled)       Subjective:   Mingo Christiansen is a 54 y.o. male who presents today to follow-up on hypertension.    Patient's main concern today is diffuse myalgias and wonders if carvedilol could be the cause of this.  He has been taking NSAIDs for his diffuse pains and reports that his blood pressures have been consistently in the 140s to 150 systolic.  He has been compliant with his medications and has been avoiding salt.    His father who is also a patient of mine wonders if he has a heart problem and wants him to get an EKG.  Patient unfortunately is a self-pay patient and would like to avoid having extra test done at this time.    Patient snores at night and has had apneic episodes as well.    Past Medical History:   Diagnosis Date   • Asthma    • Gout    • Hypertension      Past Surgical History:   Procedure Laterality Date   • OTHER ORTHOPEDIC SURGERY  1995    L Knee     Family History   Problem Relation Age of Onset   • Arthritis Father      Social History     Socioeconomic History   • Marital status: Single     Spouse name: Not on file   • Number of children: Not on file   • Years of education: Not on file   • Highest education level: Not on file   Occupational History   • Not on file   Social Needs   • Financial resource strain: Not on file   • Food insecurity     Worry: Not on file     Inability: Not on file   • Transportation needs     Medical: Not on file     Non-medical: Not on file   Tobacco Use   • Smoking status: Never Smoker   • Smokeless tobacco: Never Used   Substance and Sexual Activity   • Alcohol use: Not Currently   • Drug use: Never   • Sexual activity: Not  "on file   Lifestyle   • Physical activity     Days per week: Not on file     Minutes per session: Not on file   • Stress: Not on file   Relationships   • Social connections     Talks on phone: Not on file     Gets together: Not on file     Attends [employer] service: Not on file     Active member of club or organization: Not on file     Attends meetings of clubs or organizations: Not on file     Relationship status: Not on file   • Intimate partner violence     Fear of current or ex partner: Not on file     Emotionally abused: Not on file     Physically abused: Not on file     Forced sexual activity: Not on file   Other Topics Concern   •  Service No   • Blood Transfusions No   • Caffeine Concern No   • Occupational Exposure No   • Hobby Hazards No   • Sleep Concern No   • Stress Concern No   • Weight Concern No   • Special Diet No   • Back Care No   • Exercise No   • Bike Helmet Yes   • Seat Belt Yes   • Self-Exams No   Social History Narrative   • Not on file     Allergies   Allergen Reactions   • Erythromycin Unspecified     Pt states \"Years ago he had a high temp\".     Outpatient Encounter Medications as of 2/28/2020   Medication Sig Dispense Refill   • carvedilol (COREG) 12.5 MG Tab Take 1 Tab by mouth 2 times a day, with meals. 180 Tab 3   • naproxen (NAPROSYN) 375 MG Tab Take 1 Tab by mouth 2 times a day, with meals for 30 days. 60 Tab 0   • omeprazole (PRILOSEC) 20 MG delayed-release capsule Take 1 Cap by mouth every day for 30 days. 30 Cap 1   • allopurinol (ZYLOPRIM) 300 MG Tab Take 1 Tab by mouth every day. 30 Tab 3   • albuterol (VENTOLIN OR PROVENTIL) 108 (90 BASE) MCG/ACT Aero Soln inhalation aerosol Inhale 2 Puffs by mouth every 6 hours as needed for Shortness of Breath.     • fluticasone-salmeterol (ADVAIR HFA) 115-21 MCG/ACT inhaler Inhale 2 Puffs by mouth 2 times a day.     • [DISCONTINUED] carvedilol (COREG) 6.25 MG Tab Take 1 Tab by mouth 2 times a day, with meals. Needs to be seen for " "further refills. Thank you 180 Tab 0   • [DISCONTINUED] predniSONE (DELTASONE) 20 MG Tab Take 3 tabs daily x 3 days, then 2 tabs daily x 2 days, then 1 tab x 2 days. (Patient not taking: Reported on 1/13/2020) 15 Tab 0     No facility-administered encounter medications on file as of 2/28/2020.      Review of Systems   Constitutional: Negative for malaise/fatigue.   Respiratory: Negative for shortness of breath.    Cardiovascular: Negative for chest pain, palpitations, orthopnea, leg swelling and PND.   Gastrointestinal: Negative for abdominal pain.   Musculoskeletal: Positive for myalgias. Negative for falls.   Neurological: Negative for dizziness and loss of consciousness.   Psychiatric/Behavioral: Negative for depression.   All other systems reviewed and are negative.       Objective:   /80 (BP Location: Left arm, Patient Position: Sitting, BP Cuff Size: Adult)   Pulse 100   Ht 1.676 m (5' 6\")   Wt 122.5 kg (270 lb)   SpO2 92%   BMI 43.58 kg/m²      Physical Exam   Constitutional: He is oriented to person, place, and time. He appears well-developed and well-nourished. No distress.   HENT:   Head: Normocephalic and atraumatic.   Eyes: Conjunctivae are normal. No scleral icterus.   Neck: Normal range of motion. Neck supple.   Cardiovascular: Normal rate, regular rhythm and normal heart sounds. Exam reveals no gallop and no friction rub.   No murmur heard.  Pulmonary/Chest: Effort normal and breath sounds normal. No respiratory distress. He has no wheezes. He has no rales.   Abdominal: Soft. He exhibits no distension. There is no abdominal tenderness.   Musculoskeletal:         General: No edema.   Neurological: He is alert and oriented to person, place, and time.   Skin: Skin is warm and dry. He is not diaphoretic.   Psychiatric: He has a normal mood and affect. His behavior is normal.   Nursing note and vitals reviewed.    Echocardiogram performed in February 2016 showed normal LV function.    Holter " monitor performed in December 2017 showed sinus rhythm.  No arrhythmias.    Labs performed January 2020 were reviewed and showed normal hemoglobin and creatinine.  LDL 92 in May 2019.    Assessment:     1. Essential hypertension  carvedilol (COREG) 12.5 MG Tab   2. Myalgia     3. Fatigue, unspecified type     4. DONNA (obstructive sleep apnea)     5. Encounter for long-term (current) use of high-risk medication         Medical Decision Making:  Today's Assessment / Status / Plan:     Patient's blood pressure is uncontrolled.  Will increase carvedilol to 12.5 mg twice daily.  I have reassured the patient and reviewed Cora comp data.  Carvedilol is not associated with myalgias.    I have encouraged him to follow-up with his PCP regarding his myalgias.  He may benefit with a rheumatology consultation however will defer this to patient's PCP.    I did offer the patient an EKG however he would like to defer because he is a self-pay.    Similarly with his fatigue and his snoring I have offered a sleep study however for the same reason patient is deferring for now.    Return to clinic if needed.  Patient would like to minimize his visits since he is self-pay    Thank you for allowing me to participate in the care of this patient. Please do not hesitate to contact me with any questions.    Madeline Iraheta MD, [facility]  Cardiologist  [facility]    PLEASE NOTE: This dictation was created using voice recognition software.         Candelario Miles M.D.  [address]  [place] [address]  VIA In Basket

## 2020-02-28 NOTE — PROGRESS NOTES
"Subjective:   Mingo Christiansen is a 53-year-old male presenting to clinic for follow-up on hypertension.    Patient is under a lot of stress right now as he quit his job and is currently unemployed.  His father is in his late 90s and has been stressing him as well.    He reports that his blood pressures have been well controlled, usually 110s-120s systolic.    Due to the significant stress, he has not been able to exercise much.    Past Medical History:   Diagnosis Date   • Asthma    • Gout    • Hypertension      Past Surgical History:   Procedure Laterality Date   • OTHER ORTHOPEDIC SURGERY  1995    L Knee     Family History   Problem Relation Age of Onset   • Arthritis Father       Patient is adopted and does not know any of his family history.    Social History     Tobacco Use   Smoking Status Never Smoker   Smokeless Tobacco Never Used     Occasional alcohol use.    Allergies   Allergen Reactions   • Erythromycin Unspecified     Pt states \"Years ago he had a high temp\".     Outpatient Encounter Medications as of 2/28/2020   Medication Sig Dispense Refill   • naproxen (NAPROSYN) 375 MG Tab Take 1 Tab by mouth 2 times a day, with meals for 30 days. 60 Tab 0   • omeprazole (PRILOSEC) 20 MG delayed-release capsule Take 1 Cap by mouth every day for 30 days. 30 Cap 1   • carvedilol (COREG) 6.25 MG Tab Take 1 Tab by mouth 2 times a day, with meals. Needs to be seen for further refills. Thank you 180 Tab 0   • allopurinol (ZYLOPRIM) 300 MG Tab Take 1 Tab by mouth every day. 30 Tab 3   • albuterol (VENTOLIN OR PROVENTIL) 108 (90 BASE) MCG/ACT Aero Soln inhalation aerosol Inhale 2 Puffs by mouth every 6 hours as needed for Shortness of Breath.     • fluticasone-salmeterol (ADVAIR HFA) 115-21 MCG/ACT inhaler Inhale 2 Puffs by mouth 2 times a day.     • [DISCONTINUED] predniSONE (DELTASONE) 20 MG Tab Take 3 tabs daily x 3 days, then 2 tabs daily x 2 days, then 1 tab x 2 days. (Patient not taking: Reported on 1/13/2020) 15 " "Tab 0     No facility-administered encounter medications on file as of 2/28/2020.      Review of Systems   Constitutional: Negative for malaise/fatigue.   Respiratory: Negative for shortness of breath.    Cardiovascular: Negative for chest pain, palpitations, orthopnea, leg swelling and PND.   Gastrointestinal: Negative for abdominal pain.   Musculoskeletal: Negative for falls.   Neurological: Negative for dizziness and loss of consciousness.   Psychiatric/Behavioral: Negative for depression.   All other systems reviewed and are negative.       Objective:   /80 (BP Location: Left arm, Patient Position: Sitting, BP Cuff Size: Adult)   Pulse 100   Ht 1.676 m (5' 6\")   Wt 122.5 kg (270 lb)   SpO2 92%   BMI 43.58 kg/m²     Physical Exam   Constitutional: He is oriented to person, place, and time. He appears well-developed and well-nourished. No distress.   HENT:   Head: Normocephalic and atraumatic.   Eyes: Conjunctivae are normal.   Neck: Normal range of motion. Neck supple. No JVD present.   Cardiovascular: Normal rate, regular rhythm and normal heart sounds. Exam reveals no gallop and no friction rub.   No murmur heard.  Pulmonary/Chest: Effort normal and breath sounds normal. No respiratory distress. He has no wheezes. He has no rales.   Abdominal: Soft. There is no abdominal tenderness.   Musculoskeletal:         General: No edema.   Neurological: He is alert and oriented to person, place, and time.   Skin: Skin is warm and dry. He is not diaphoretic.   Psychiatric: He has a normal mood and affect.   Nursing note and vitals reviewed.    Echocardiogram performed in February 2016 showed normal LV function.    Holter monitor performed in December 2017 showed sinus rhythm.  No arrhythmias.    Assessment:     No diagnosis found.    Medical Decision Making:  Today's Assessment / Status / Plan:     Hypertension: Blood pressure is at goal.  Continue carvedilol at current dose.  Chem-7 ordered today.    Fasting " lipid panel ordered for screening purposes.    Total 15 minutes face-to-face time spent with patient, with greater than 50% of the total time discussing patient's issues and symptoms as listed above in assessment and plan, as well as managing coordination of care for future evaluation and treatment. Most of the time was spent discussing the importance of exercise and weight loss.  Dietary modification discussed as well.      Return to clinic in 1 year or earlier if needed.    Thank you for allowing me to participate in the care of this patient. Please do not hesitate to contact me with any questions.    Madeline Iraheta MD  Cardiologist  [facility]      PLEASE NOTE: This dictation was created using voice recognition software.

## 2020-02-28 NOTE — PROGRESS NOTES
Chief Complaint   Patient presents with   • Chest Pain   • HTN (Controlled)       Subjective:   Mingo Christiansen is a 54 y.o. male who presents today to follow-up on hypertension.    Patient's main concern today is diffuse myalgias and wonders if carvedilol could be the cause of this.  He has been taking NSAIDs for his diffuse pains and reports that his blood pressures have been consistently in the 140s to 150 systolic.  He has been compliant with his medications and has been avoiding salt.    His father who is also a patient of mine wonders if he has a heart problem and wants him to get an EKG.  Patient unfortunately is a self-pay patient and would like to avoid having extra test done at this time.    Patient snores at night and has had apneic episodes as well.    Past Medical History:   Diagnosis Date   • Asthma    • Gout    • Hypertension      Past Surgical History:   Procedure Laterality Date   • OTHER ORTHOPEDIC SURGERY  1995    L Knee     Family History   Problem Relation Age of Onset   • Arthritis Father      Social History     Socioeconomic History   • Marital status: Single     Spouse name: Not on file   • Number of children: Not on file   • Years of education: Not on file   • Highest education level: Not on file   Occupational History   • Not on file   Social Needs   • Financial resource strain: Not on file   • Food insecurity     Worry: Not on file     Inability: Not on file   • Transportation needs     Medical: Not on file     Non-medical: Not on file   Tobacco Use   • Smoking status: Never Smoker   • Smokeless tobacco: Never Used   Substance and Sexual Activity   • Alcohol use: Not Currently   • Drug use: Never   • Sexual activity: Not on file   Lifestyle   • Physical activity     Days per week: Not on file     Minutes per session: Not on file   • Stress: Not on file   Relationships   • Social connections     Talks on phone: Not on file     Gets together: Not on file     Attends [employer] service:  "Not on file     Active member of club or organization: Not on file     Attends meetings of clubs or organizations: Not on file     Relationship status: Not on file   • Intimate partner violence     Fear of current or ex partner: Not on file     Emotionally abused: Not on file     Physically abused: Not on file     Forced sexual activity: Not on file   Other Topics Concern   •  Service No   • Blood Transfusions No   • Caffeine Concern No   • Occupational Exposure No   • Hobby Hazards No   • Sleep Concern No   • Stress Concern No   • Weight Concern No   • Special Diet No   • Back Care No   • Exercise No   • Bike Helmet Yes   • Seat Belt Yes   • Self-Exams No   Social History Narrative   • Not on file     Allergies   Allergen Reactions   • Erythromycin Unspecified     Pt states \"Years ago he had a high temp\".     Outpatient Encounter Medications as of 2/28/2020   Medication Sig Dispense Refill   • carvedilol (COREG) 12.5 MG Tab Take 1 Tab by mouth 2 times a day, with meals. 180 Tab 3   • naproxen (NAPROSYN) 375 MG Tab Take 1 Tab by mouth 2 times a day, with meals for 30 days. 60 Tab 0   • omeprazole (PRILOSEC) 20 MG delayed-release capsule Take 1 Cap by mouth every day for 30 days. 30 Cap 1   • allopurinol (ZYLOPRIM) 300 MG Tab Take 1 Tab by mouth every day. 30 Tab 3   • albuterol (VENTOLIN OR PROVENTIL) 108 (90 BASE) MCG/ACT Aero Soln inhalation aerosol Inhale 2 Puffs by mouth every 6 hours as needed for Shortness of Breath.     • fluticasone-salmeterol (ADVAIR HFA) 115-21 MCG/ACT inhaler Inhale 2 Puffs by mouth 2 times a day.     • [DISCONTINUED] carvedilol (COREG) 6.25 MG Tab Take 1 Tab by mouth 2 times a day, with meals. Needs to be seen for further refills. Thank you 180 Tab 0   • [DISCONTINUED] predniSONE (DELTASONE) 20 MG Tab Take 3 tabs daily x 3 days, then 2 tabs daily x 2 days, then 1 tab x 2 days. (Patient not taking: Reported on 1/13/2020) 15 Tab 0     No facility-administered encounter medications " "on file as of 2/28/2020.      Review of Systems   Constitutional: Negative for malaise/fatigue.   Respiratory: Negative for shortness of breath.    Cardiovascular: Negative for chest pain, palpitations, orthopnea, leg swelling and PND.   Gastrointestinal: Negative for abdominal pain.   Musculoskeletal: Positive for myalgias. Negative for falls.   Neurological: Negative for dizziness and loss of consciousness.   Psychiatric/Behavioral: Negative for depression.   All other systems reviewed and are negative.       Objective:   /80 (BP Location: Left arm, Patient Position: Sitting, BP Cuff Size: Adult)   Pulse 100   Ht 1.676 m (5' 6\")   Wt 122.5 kg (270 lb)   SpO2 92%   BMI 43.58 kg/m²     Physical Exam   Constitutional: He is oriented to person, place, and time. He appears well-developed and well-nourished. No distress.   HENT:   Head: Normocephalic and atraumatic.   Eyes: Conjunctivae are normal. No scleral icterus.   Neck: Normal range of motion. Neck supple.   Cardiovascular: Normal rate, regular rhythm and normal heart sounds. Exam reveals no gallop and no friction rub.   No murmur heard.  Pulmonary/Chest: Effort normal and breath sounds normal. No respiratory distress. He has no wheezes. He has no rales.   Abdominal: Soft. He exhibits no distension. There is no abdominal tenderness.   Musculoskeletal:         General: No edema.   Neurological: He is alert and oriented to person, place, and time.   Skin: Skin is warm and dry. He is not diaphoretic.   Psychiatric: He has a normal mood and affect. His behavior is normal.   Nursing note and vitals reviewed.    Echocardiogram performed in February 2016 showed normal LV function.    Holter monitor performed in December 2017 showed sinus rhythm.  No arrhythmias.    Labs performed January 2020 were reviewed and showed normal hemoglobin and creatinine.  LDL 92 in May 2019.    Assessment:     1. Essential hypertension  carvedilol (COREG) 12.5 MG Tab   2. Myalgia  "    3. Fatigue, unspecified type     4. DONNA (obstructive sleep apnea)     5. Encounter for long-term (current) use of high-risk medication         Medical Decision Making:  Today's Assessment / Status / Plan:     Patient's blood pressure is uncontrolled.  Will increase carvedilol to 12.5 mg twice daily.  I have reassured the patient and reviewed Cora comp data.  Carvedilol is not associated with myalgias.    I have encouraged him to follow-up with his PCP regarding his myalgias.  He may benefit with a rheumatology consultation however will defer this to patient's PCP.    I did offer the patient an EKG however he would like to defer because he is a self-pay.    Similarly with his fatigue and his snoring I have offered a sleep study however for the same reason patient is deferring for now.    Return to clinic if needed.  Patient would like to minimize his visits since he is self-pay    Thank you for allowing me to participate in the care of this patient. Please do not hesitate to contact me with any questions.    Madeline Iraheta MD, [facility]  Cardiologist  [facility]    PLEASE NOTE: This dictation was created using voice recognition software.

## 2020-04-03 DIAGNOSIS — M25.50 GENERALIZED JOINT PAIN: ICD-10-CM

## 2020-04-03 DIAGNOSIS — M25.50 POLYARTHRALGIA: ICD-10-CM

## 2020-04-03 RX ORDER — MELOXICAM 7.5 MG/1
TABLET ORAL
Qty: 30 TAB | Refills: 0 | Status: SHIPPED | OUTPATIENT
Start: 2020-04-03 | End: 2021-03-11

## 2020-04-07 ENCOUNTER — TELEPHONE (OUTPATIENT)
Dept: PHYSICAL MEDICINE AND REHAB | Facility: MEDICAL CENTER | Age: 55
End: 2020-04-07

## 2020-04-07 RX ORDER — NAPROXEN 375 MG/1
TABLET ORAL
Qty: 60 TAB | Refills: 0 | Status: SHIPPED | OUTPATIENT
Start: 2020-04-07 | End: 2021-03-11

## 2020-04-07 NOTE — TELEPHONE ENCOUNTER
Was the patient seen in the last year in this department? Yes    Does patient have an active prescription for medications requested? No      Do they have a refill on file? No     If a controlled substance, is a new  on file? No     Received Request Via: Pharmacy    If pharmacy requests, is the patient still taking the medication or medication discontinued? yes

## 2020-04-07 NOTE — TELEPHONE ENCOUNTER
Refill approved and electronically sent to pharmacy.  Please let him know that I can fill this one last time.  Hopefully, he will be able to get insurance soon.  Thanks.

## 2020-04-07 NOTE — TELEPHONE ENCOUNTER
Can we set him up for a telehealth visit?  He was a new patient in Feb. 2020 and I would like to follow-up prior to refilling this medication.  Thank you.

## 2020-07-06 DIAGNOSIS — M25.50 GENERALIZED JOINT PAIN: ICD-10-CM

## 2020-07-06 RX ORDER — ALLOPURINOL 300 MG/1
TABLET ORAL
Qty: 30 TAB | Refills: 0 | Status: SHIPPED | OUTPATIENT
Start: 2020-07-06 | End: 2020-07-30 | Stop reason: SDUPTHER

## 2020-07-29 ENCOUNTER — PATIENT MESSAGE (OUTPATIENT)
Dept: MEDICAL GROUP | Facility: PHYSICIAN GROUP | Age: 55
End: 2020-07-29

## 2020-07-29 DIAGNOSIS — M25.50 GENERALIZED JOINT PAIN: ICD-10-CM

## 2020-07-30 RX ORDER — ALLOPURINOL 300 MG/1
TABLET ORAL
Qty: 90 TAB | Refills: 3 | Status: SHIPPED | OUTPATIENT
Start: 2020-07-30 | End: 2021-07-01 | Stop reason: SDUPTHER

## 2021-03-11 ENCOUNTER — OFFICE VISIT (OUTPATIENT)
Dept: CARDIOLOGY | Facility: MEDICAL CENTER | Age: 56
End: 2021-03-11

## 2021-03-11 VITALS
WEIGHT: 269 LBS | RESPIRATION RATE: 14 BRPM | HEART RATE: 84 BPM | BODY MASS INDEX: 43.23 KG/M2 | OXYGEN SATURATION: 94 % | SYSTOLIC BLOOD PRESSURE: 142 MMHG | DIASTOLIC BLOOD PRESSURE: 80 MMHG | HEIGHT: 66 IN

## 2021-03-11 DIAGNOSIS — Z13.220 SCREENING FOR CHOLESTEROL LEVEL: ICD-10-CM

## 2021-03-11 DIAGNOSIS — I10 ESSENTIAL HYPERTENSION: ICD-10-CM

## 2021-03-11 PROCEDURE — 99213 OFFICE O/P EST LOW 20 MIN: CPT | Performed by: INTERNAL MEDICINE

## 2021-03-11 RX ORDER — CARVEDILOL 12.5 MG/1
12.5 TABLET ORAL 2 TIMES DAILY WITH MEALS
Qty: 180 TABLET | Refills: 3 | Status: SHIPPED | OUTPATIENT
Start: 2021-03-11 | End: 2021-07-01 | Stop reason: SDUPTHER

## 2021-03-11 ASSESSMENT — ENCOUNTER SYMPTOMS
ABDOMINAL PAIN: 0
SHORTNESS OF BREATH: 0
LOSS OF CONSCIOUSNESS: 0
FALLS: 0
DEPRESSION: 0
PALPITATIONS: 0
PND: 0
ORTHOPNEA: 0
DIZZINESS: 0

## 2021-03-11 NOTE — LETTER
[facility]   [address], [address]  [place] [address]  Phone: [phone]  Fax: [phone]              Mingo Christiansen  1965    Encounter Date: 3/11/2021    Madeline Iraheta M.D.          PROGRESS NOTE:  Chief Complaint   Patient presents with   • Chest Pain   • HTN (Controlled)       Subjective:   Mingo Christiansen is a 55 y.o. male who presents today to follow-up on hypertension.    Patient has been doing well overall.  He is working part-time.  Denies any anginal symptoms with activity.  Has not been checking his blood pressure at home.    Past Medical History:   Diagnosis Date   • Asthma    • Gout    • Hypertension      Past Surgical History:   Procedure Laterality Date   • OTHER ORTHOPEDIC SURGERY  1995    L Knee     Family History   Problem Relation Age of Onset   • Arthritis Father      Social History     Socioeconomic History   • Marital status: Single     Spouse name: Not on file   • Number of children: Not on file   • Years of education: Not on file   • Highest education level: Not on file   Occupational History   • Not on file   Tobacco Use   • Smoking status: Never Smoker   • Smokeless tobacco: Never Used   Substance and Sexual Activity   • Alcohol use: Yes     Alcohol/week: 3.6 oz     Types: 6 Cans of beer per week     Comment: weekends   • Drug use: Never   • Sexual activity: Not on file   Other Topics Concern   •  Service No   • Blood Transfusions No   • Caffeine Concern No   • Occupational Exposure No   • Hobby Hazards No   • Sleep Concern No   • Stress Concern No   • Weight Concern No   • Special Diet No   • Back Care No   • Exercise No   • Bike Helmet Yes   • Seat Belt Yes   • Self-Exams No   Social History Narrative   • Not on file     Social Determinants of Health     Financial Resource Strain:    • Difficulty of Paying Living Expenses:    Food Insecurity:    • Worried About Running Out of Food in the Last Year:    • Ran  "Out of Food in the Last Year:    Transportation Needs:    • Lack of Transportation (Medical):    • Lack of Transportation (Non-Medical):    Physical Activity:    • Days of Exercise per Week:    • Minutes of Exercise per Session:    Stress:    • Feeling of Stress :    Social Connections:    • Frequency of Communication with Friends and Family:    • Frequency of Social Gatherings with Friends and Family:    • Attends Mu-ism Services:    • Active Member of Clubs or Organizations:    • Attends Club or Organization Meetings:    • Marital Status:    Intimate Partner Violence:    • Fear of Current or Ex-Partner:    • Emotionally Abused:    • Physically Abused:    • Sexually Abused:      Allergies   Allergen Reactions   • Erythromycin Unspecified     Pt states \"Years ago he had a high temp\".     Outpatient Encounter Medications as of 3/11/2021   Medication Sig Dispense Refill   • carvedilol (COREG) 12.5 MG Tab Take 1 tablet by mouth 2 times a day, with meals. 180 tablet 3   • allopurinol (ZYLOPRIM) 300 MG Tab Take 1 tablet by mouth once daily 90 Tab 3   • [DISCONTINUED] naproxen (NAPROSYN) 375 MG Tab TAKE 1 TABLET BY MOUTH TWICE DAILY WITH MEALS FOR 30 DAYS 60 Tab 0   • [DISCONTINUED] meloxicam (MOBIC) 7.5 MG Tab Take 1 tablet by mouth once daily 30 Tab 0   • [DISCONTINUED] carvedilol (COREG) 12.5 MG Tab Take 1 Tab by mouth 2 times a day, with meals. 180 Tab 3   • [DISCONTINUED] albuterol (VENTOLIN OR PROVENTIL) 108 (90 BASE) MCG/ACT Aero Soln inhalation aerosol Inhale 2 Puffs by mouth every 6 hours as needed for Shortness of Breath.     • [DISCONTINUED] fluticasone-salmeterol (ADVAIR HFA) 115-21 MCG/ACT inhaler Inhale 2 Puffs by mouth 2 times a day.       No facility-administered encounter medications on file as of 3/11/2021.     Review of Systems   Constitutional: Negative for malaise/fatigue.   Respiratory: Negative for shortness of breath.    Cardiovascular: Negative for chest pain, palpitations, orthopnea, leg " "swelling and PND.   Gastrointestinal: Negative for abdominal pain.   Musculoskeletal: Negative for falls.   Neurological: Negative for dizziness and loss of consciousness.   Psychiatric/Behavioral: Negative for depression.   All other systems reviewed and are negative.       Objective:   /80 (BP Location: Left arm, Patient Position: Sitting, BP Cuff Size: Adult)   Pulse 84   Resp 14   Ht 1.676 m (5' 6\")   Wt 122 kg (269 lb)   SpO2 94%   BMI 43.42 kg/m²     Physical Exam   Constitutional: He is oriented to person, place, and time. He appears well-developed and well-nourished. No distress.   HENT:   Head: Normocephalic and atraumatic.   Eyes: Conjunctivae are normal. No scleral icterus.   Cardiovascular: Normal rate, regular rhythm and normal heart sounds. Exam reveals no gallop and no friction rub.   No murmur heard.  Pulmonary/Chest: Effort normal and breath sounds normal. No respiratory distress. He has no wheezes. He has no rales.   Musculoskeletal:         General: No edema.      Cervical back: Normal range of motion and neck supple.   Neurological: He is alert and oriented to person, place, and time.   Skin: Skin is warm and dry. He is not diaphoretic.   Psychiatric: He has a normal mood and affect. His behavior is normal. Judgment and thought content normal.   Nursing note and vitals reviewed.    Echocardiogram performed in February 2016 showed normal LV function.    Holter monitor performed in December 2017 showed sinus rhythm.  No arrhythmias.    Assessment:     1. Essential hypertension  carvedilol (COREG) 12.5 MG Tab    Basic Metabolic Panel    CBC WITHOUT DIFFERENTIAL   2. Screening for cholesterol level  Lipid Profile    HEPATIC FUNCTION PANEL       Medical Decision Making:  Today's Assessment / Status / Plan:     Hypertension not at goal but patient endorses drinking a lot of caffeine this morning.  I offered an increase in his carvedilol however patient is skeptical as he does not know what " his blood pressure runs at other times of the day.  He will therefore watch his blood pressure at home and if it stays in the 140s systolic or higher consistently, he will let us know.    Basic labs ordered today.    Patient continues to be uninsured and is paying for this visit himself.  Sleep study has previously been discussed with him but he has deferred due to financial reasons.    Return to clinic in 1 year or earlier if needed.    Thank you for allowing me to participate in the care of this patient. Please do not hesitate to contact me with any questions.    Madeline Iraheta MD, [facility]  Cardiologist  [facility]    PLEASE NOTE: This dictation was created using voice recognition software.           Candelario Miles M.D.  [address]  [place] [address]  Via In Basket

## 2021-03-11 NOTE — PROGRESS NOTES
Chief Complaint   Patient presents with   • Chest Pain   • HTN (Controlled)       Subjective:   Mingo Christiansen is a 55 y.o. male who presents today to follow-up on hypertension.    Patient has been doing well overall.  He is working part-time.  Denies any anginal symptoms with activity.  Has not been checking his blood pressure at home.    Past Medical History:   Diagnosis Date   • Asthma    • Gout    • Hypertension      Past Surgical History:   Procedure Laterality Date   • OTHER ORTHOPEDIC SURGERY  1995    L Knee     Family History   Problem Relation Age of Onset   • Arthritis Father      Social History     Socioeconomic History   • Marital status: Single     Spouse name: Not on file   • Number of children: Not on file   • Years of education: Not on file   • Highest education level: Not on file   Occupational History   • Not on file   Tobacco Use   • Smoking status: Never Smoker   • Smokeless tobacco: Never Used   Substance and Sexual Activity   • Alcohol use: Yes     Alcohol/week: 3.6 oz     Types: 6 Cans of beer per week     Comment: weekends   • Drug use: Never   • Sexual activity: Not on file   Other Topics Concern   •  Service No   • Blood Transfusions No   • Caffeine Concern No   • Occupational Exposure No   • Hobby Hazards No   • Sleep Concern No   • Stress Concern No   • Weight Concern No   • Special Diet No   • Back Care No   • Exercise No   • Bike Helmet Yes   • Seat Belt Yes   • Self-Exams No   Social History Narrative   • Not on file     Social Determinants of Health     Financial Resource Strain:    • Difficulty of Paying Living Expenses:    Food Insecurity:    • Worried About Running Out of Food in the Last Year:    • Ran Out of Food in the Last Year:    Transportation Needs:    • Lack of Transportation (Medical):    • Lack of Transportation (Non-Medical):    Physical Activity:    • Days of Exercise per Week:    • Minutes of Exercise per Session:    Stress:    • Feeling of Stress :   "  Social Connections:    • Frequency of Communication with Friends and Family:    • Frequency of Social Gatherings with Friends and Family:    • Attends [employer] Services:    • Active Member of Clubs or Organizations:    • Attends Club or Organization Meetings:    • Marital Status:    Intimate Partner Violence:    • Fear of Current or Ex-Partner:    • Emotionally Abused:    • Physically Abused:    • Sexually Abused:      Allergies   Allergen Reactions   • Erythromycin Unspecified     Pt states \"Years ago he had a high temp\".     Outpatient Encounter Medications as of 3/11/2021   Medication Sig Dispense Refill   • carvedilol (COREG) 12.5 MG Tab Take 1 tablet by mouth 2 times a day, with meals. 180 tablet 3   • allopurinol (ZYLOPRIM) 300 MG Tab Take 1 tablet by mouth once daily 90 Tab 3   • [DISCONTINUED] naproxen (NAPROSYN) 375 MG Tab TAKE 1 TABLET BY MOUTH TWICE DAILY WITH MEALS FOR 30 DAYS 60 Tab 0   • [DISCONTINUED] meloxicam (MOBIC) 7.5 MG Tab Take 1 tablet by mouth once daily 30 Tab 0   • [DISCONTINUED] carvedilol (COREG) 12.5 MG Tab Take 1 Tab by mouth 2 times a day, with meals. 180 Tab 3   • [DISCONTINUED] albuterol (VENTOLIN OR PROVENTIL) 108 (90 BASE) MCG/ACT Aero Soln inhalation aerosol Inhale 2 Puffs by mouth every 6 hours as needed for Shortness of Breath.     • [DISCONTINUED] fluticasone-salmeterol (ADVAIR HFA) 115-21 MCG/ACT inhaler Inhale 2 Puffs by mouth 2 times a day.       No facility-administered encounter medications on file as of 3/11/2021.     Review of Systems   Constitutional: Negative for malaise/fatigue.   Respiratory: Negative for shortness of breath.    Cardiovascular: Negative for chest pain, palpitations, orthopnea, leg swelling and PND.   Gastrointestinal: Negative for abdominal pain.   Musculoskeletal: Negative for falls.   Neurological: Negative for dizziness and loss of consciousness.   Psychiatric/Behavioral: Negative for depression.   All other systems reviewed and are " "negative.       Objective:   /80 (BP Location: Left arm, Patient Position: Sitting, BP Cuff Size: Adult)   Pulse 84   Resp 14   Ht 1.676 m (5' 6\")   Wt 122 kg (269 lb)   SpO2 94%   BMI 43.42 kg/m²     Physical Exam   Constitutional: He is oriented to person, place, and time. He appears well-developed and well-nourished. No distress.   HENT:   Head: Normocephalic and atraumatic.   Eyes: Conjunctivae are normal. No scleral icterus.   Cardiovascular: Normal rate, regular rhythm and normal heart sounds. Exam reveals no gallop and no friction rub.   No murmur heard.  Pulmonary/Chest: Effort normal and breath sounds normal. No respiratory distress. He has no wheezes. He has no rales.   Musculoskeletal:         General: No edema.      Cervical back: Normal range of motion and neck supple.   Neurological: He is alert and oriented to person, place, and time.   Skin: Skin is warm and dry. He is not diaphoretic.   Psychiatric: He has a normal mood and affect. His behavior is normal. Judgment and thought content normal.   Nursing note and vitals reviewed.    Echocardiogram performed in February 2016 showed normal LV function.    Holter monitor performed in December 2017 showed sinus rhythm.  No arrhythmias.    Assessment:     1. Essential hypertension  carvedilol (COREG) 12.5 MG Tab    Basic Metabolic Panel    CBC WITHOUT DIFFERENTIAL   2. Screening for cholesterol level  Lipid Profile    HEPATIC FUNCTION PANEL       Medical Decision Making:  Today's Assessment / Status / Plan:     Hypertension not at goal but patient endorses drinking a lot of caffeine this morning.  I offered an increase in his carvedilol however patient is skeptical as he does not know what his blood pressure runs at other times of the day.  He will therefore watch his blood pressure at home and if it stays in the 140s systolic or higher consistently, he will let us know.    Basic labs ordered today.    Patient continues to be uninsured and is " paying for this visit himself.  Sleep study has previously been discussed with him but he has deferred due to financial reasons.    Return to clinic in 1 year or earlier if needed.    Thank you for allowing me to participate in the care of this patient. Please do not hesitate to contact me with any questions.    Madeline Iraheta MD, [facility]  Cardiologist  [facility]    PLEASE NOTE: This dictation was created using voice recognition software.

## 2021-03-15 DIAGNOSIS — Z23 NEED FOR VACCINATION: ICD-10-CM

## 2021-04-03 ENCOUNTER — HOSPITAL ENCOUNTER (OUTPATIENT)
Dept: LAB | Facility: MEDICAL CENTER | Age: 56
End: 2021-04-03
Attending: INTERNAL MEDICINE

## 2021-04-03 DIAGNOSIS — I10 ESSENTIAL HYPERTENSION: ICD-10-CM

## 2021-04-03 DIAGNOSIS — Z13.220 SCREENING FOR CHOLESTEROL LEVEL: ICD-10-CM

## 2021-04-03 LAB
ALBUMIN SERPL BCP-MCNC: 4.3 G/DL (ref 3.2–4.9)
ALP SERPL-CCNC: 55 U/L (ref 30–99)
ALT SERPL-CCNC: 23 U/L (ref 2–50)
ANION GAP SERPL CALC-SCNC: 8 MMOL/L (ref 7–16)
AST SERPL-CCNC: 24 U/L (ref 12–45)
BILIRUB CONJ SERPL-MCNC: <0.2 MG/DL (ref 0.1–0.5)
BILIRUB INDIRECT SERPL-MCNC: NORMAL MG/DL (ref 0–1)
BILIRUB SERPL-MCNC: 0.4 MG/DL (ref 0.1–1.5)
BUN SERPL-MCNC: 16 MG/DL (ref 8–22)
CALCIUM SERPL-MCNC: 9.4 MG/DL (ref 8.5–10.5)
CHLORIDE SERPL-SCNC: 104 MMOL/L (ref 96–112)
CHOLEST SERPL-MCNC: 153 MG/DL (ref 100–199)
CO2 SERPL-SCNC: 27 MMOL/L (ref 20–33)
CREAT SERPL-MCNC: 0.84 MG/DL (ref 0.5–1.4)
ERYTHROCYTE [DISTWIDTH] IN BLOOD BY AUTOMATED COUNT: 48.4 FL (ref 35.9–50)
GLUCOSE SERPL-MCNC: 116 MG/DL (ref 65–99)
HCT VFR BLD AUTO: 47.6 % (ref 42–52)
HDLC SERPL-MCNC: 64 MG/DL
HGB BLD-MCNC: 15.5 G/DL (ref 14–18)
LDLC SERPL CALC-MCNC: 77 MG/DL
MCH RBC QN AUTO: 33.1 PG (ref 27–33)
MCHC RBC AUTO-ENTMCNC: 32.6 G/DL (ref 33.7–35.3)
MCV RBC AUTO: 101.7 FL (ref 81.4–97.8)
PLATELET # BLD AUTO: 209 K/UL (ref 164–446)
PMV BLD AUTO: 10.3 FL (ref 9–12.9)
POTASSIUM SERPL-SCNC: 4.2 MMOL/L (ref 3.6–5.5)
PROT SERPL-MCNC: 7.3 G/DL (ref 6–8.2)
RBC # BLD AUTO: 4.68 M/UL (ref 4.7–6.1)
SODIUM SERPL-SCNC: 139 MMOL/L (ref 135–145)
TRIGL SERPL-MCNC: 60 MG/DL (ref 0–149)
WBC # BLD AUTO: 5.3 K/UL (ref 4.8–10.8)

## 2021-04-03 PROCEDURE — 80048 BASIC METABOLIC PNL TOTAL CA: CPT

## 2021-04-03 PROCEDURE — 85027 COMPLETE CBC AUTOMATED: CPT

## 2021-04-03 PROCEDURE — 80076 HEPATIC FUNCTION PANEL: CPT

## 2021-04-03 PROCEDURE — 80061 LIPID PANEL: CPT

## 2021-04-03 PROCEDURE — 36415 COLL VENOUS BLD VENIPUNCTURE: CPT

## 2021-04-20 ENCOUNTER — IMMUNIZATION (OUTPATIENT)
Dept: FAMILY PLANNING/WOMEN'S HEALTH CLINIC | Facility: IMMUNIZATION CENTER | Age: 56
End: 2021-04-20
Attending: INTERNAL MEDICINE
Payer: OTHER GOVERNMENT

## 2021-04-20 DIAGNOSIS — Z23 ENCOUNTER FOR VACCINATION: Primary | ICD-10-CM

## 2021-04-20 DIAGNOSIS — Z23 NEED FOR VACCINATION: ICD-10-CM

## 2021-04-20 PROCEDURE — 0001A PFIZER SARS-COV-2 VACCINE: CPT

## 2021-04-20 PROCEDURE — 91300 PFIZER SARS-COV-2 VACCINE: CPT

## 2021-05-14 ENCOUNTER — IMMUNIZATION (OUTPATIENT)
Dept: FAMILY PLANNING/WOMEN'S HEALTH CLINIC | Facility: IMMUNIZATION CENTER | Age: 56
End: 2021-05-14
Payer: OTHER GOVERNMENT

## 2021-05-14 DIAGNOSIS — Z23 ENCOUNTER FOR VACCINATION: Primary | ICD-10-CM

## 2021-05-14 PROCEDURE — 91300 PFIZER SARS-COV-2 VACCINE: CPT | Performed by: INTERNAL MEDICINE

## 2021-05-14 PROCEDURE — 0002A PFIZER SARS-COV-2 VACCINE: CPT | Performed by: INTERNAL MEDICINE

## 2021-07-01 ENCOUNTER — OFFICE VISIT (OUTPATIENT)
Dept: MEDICAL GROUP | Facility: PHYSICIAN GROUP | Age: 56
End: 2021-07-01

## 2021-07-01 VITALS
DIASTOLIC BLOOD PRESSURE: 68 MMHG | SYSTOLIC BLOOD PRESSURE: 120 MMHG | WEIGHT: 271.8 LBS | HEART RATE: 90 BPM | HEIGHT: 66 IN | OXYGEN SATURATION: 95 % | BODY MASS INDEX: 43.68 KG/M2 | TEMPERATURE: 98.4 F

## 2021-07-01 DIAGNOSIS — M25.50 GENERALIZED JOINT PAIN: ICD-10-CM

## 2021-07-01 DIAGNOSIS — Z00.00 WELL ADULT EXAM: ICD-10-CM

## 2021-07-01 DIAGNOSIS — I10 ESSENTIAL HYPERTENSION: ICD-10-CM

## 2021-07-01 DIAGNOSIS — M1A.09X0 CHRONIC GOUT OF MULTIPLE SITES, UNSPECIFIED CAUSE: ICD-10-CM

## 2021-07-01 PROCEDURE — 99214 OFFICE O/P EST MOD 30 MIN: CPT | Performed by: FAMILY MEDICINE

## 2021-07-01 RX ORDER — CARVEDILOL 12.5 MG/1
12.5 TABLET ORAL 2 TIMES DAILY WITH MEALS
Qty: 180 TABLET | Refills: 3 | Status: SHIPPED | OUTPATIENT
Start: 2021-07-01 | End: 2022-08-09 | Stop reason: SDUPTHER

## 2021-07-01 RX ORDER — ALLOPURINOL 300 MG/1
TABLET ORAL
Qty: 90 TABLET | Refills: 3 | Status: SHIPPED | OUTPATIENT
Start: 2021-07-01 | End: 2022-08-01 | Stop reason: SDUPTHER

## 2021-07-01 ASSESSMENT — PATIENT HEALTH QUESTIONNAIRE - PHQ9: CLINICAL INTERPRETATION OF PHQ2 SCORE: 0

## 2021-07-01 ASSESSMENT — FIBROSIS 4 INDEX: FIB4 SCORE: 1.34

## 2021-07-02 NOTE — ASSESSMENT & PLAN NOTE
Chronic issue  On allopurinol  Denies any flares in the past  Requesting new blood work today and refill

## 2021-07-02 NOTE — PROGRESS NOTES
"Subjective:     Chief Complaint   Patient presents with   • Medication Refill       HPI:   Mingo presents today to discuss the following.      Chronic gout of multiple sites  Chronic issue  On allopurinol  Denies any flares in the past  Requesting new blood work today and refill      Essential hypertension  Chronic issue  On coreg 12.5mg BID  Requesting refill today      Past Medical History:   Diagnosis Date   • Asthma    • Gout    • Hypertension        Current Outpatient Medications Ordered in Epic   Medication Sig Dispense Refill   • allopurinol (ZYLOPRIM) 300 MG Tab Take 1 tablet by mouth once daily 90 tablet 3   • carvedilol (COREG) 12.5 MG Tab Take 1 tablet by mouth 2 times a day with meals. 180 tablet 3     No current Epic-ordered facility-administered medications on file.       Allergies:  Erythromycin    Health Maintenance: Completed    ROS:  Gen: no fevers/chills, no changes in weight  Eyes: no changes in vision  Pulm: no sob, no cough  CV: no chest pain, no palpitations  GI: no nausea/vomiting, no diarrhea      Objective:     Exam:  /68 (BP Location: Left arm, Patient Position: Sitting, BP Cuff Size: Adult)   Pulse 90   Temp 36.9 °C (98.4 °F) (Temporal)   Ht 1.676 m (5' 6\")   Wt 123 kg (271 lb 12.8 oz)   SpO2 95%   BMI 43.87 kg/m²  Body mass index is 43.87 kg/m².          Constitutional: Alert, no distress, well-groomed.  Skin: Warm, dry, good turgor, no rashes in visible areas.  Eye: Equal, round and reactive, conjunctiva clear, lids normal.  ENMT: Lips without lesions, good dentition, moist mucous membranes.  Neck: Trachea midline, no masses, no thyromegaly.  Respiratory: Unlabored respiratory effort, no cough.  MSK: Normal gait, moves all extremities.  Neuro: Grossly non-focal.   Psych: Alert and oriented x3, normal affect and mood.        Assessment & Plan:     55 y.o. male with the following -     1. Chronic gout of multiple sites, unspecified cause  2. Generalized joint pain  Chronic, " stable condition.  Well-controlled with allopurinol.  He is due for repeat labs and also requesting a refill for allopurinol today.  - allopurinol (ZYLOPRIM) 300 MG Tab; Take 1 tablet by mouth once daily  Dispense: 90 tablet; Refill: 3    3. Essential hypertension  Chronic, stable condition.  Well-controlled with Coreg.  Requesting a refill today.  - carvedilol (COREG) 12.5 MG Tab; Take 1 tablet by mouth 2 times a day with meals.  Dispense: 180 tablet; Refill: 3    4. Well adult exam  - URIC ACID; Future  - HEMOGLOBIN A1C; Future      Return in about 6 months (around 1/1/2022).    Please note that this dictation was created using voice recognition software. I have made every reasonable attempt to correct obvious errors, but I expect that there are errors of grammar and possibly content that I did not discover before finalizing the note.

## 2021-07-09 ENCOUNTER — HOSPITAL ENCOUNTER (OUTPATIENT)
Dept: LAB | Facility: MEDICAL CENTER | Age: 56
End: 2021-07-09
Attending: FAMILY MEDICINE

## 2021-07-09 DIAGNOSIS — Z00.00 WELL ADULT EXAM: ICD-10-CM

## 2021-07-09 LAB
EST. AVERAGE GLUCOSE BLD GHB EST-MCNC: 117 MG/DL
HBA1C MFR BLD: 5.7 % (ref 4–5.6)
URATE SERPL-MCNC: 5.4 MG/DL (ref 2.5–8.3)

## 2021-07-09 PROCEDURE — 84550 ASSAY OF BLOOD/URIC ACID: CPT

## 2021-07-09 PROCEDURE — 36415 COLL VENOUS BLD VENIPUNCTURE: CPT

## 2021-07-09 PROCEDURE — 83036 HEMOGLOBIN GLYCOSYLATED A1C: CPT

## 2021-11-24 ENCOUNTER — APPOINTMENT (OUTPATIENT)
Dept: RADIOLOGY | Facility: IMAGING CENTER | Age: 56
End: 2021-11-24
Attending: FAMILY MEDICINE

## 2021-11-24 ENCOUNTER — HOSPITAL ENCOUNTER (OUTPATIENT)
Facility: MEDICAL CENTER | Age: 56
End: 2021-11-24
Attending: FAMILY MEDICINE

## 2021-11-24 ENCOUNTER — OFFICE VISIT (OUTPATIENT)
Dept: URGENT CARE | Facility: PHYSICIAN GROUP | Age: 56
End: 2021-11-24
Payer: OTHER GOVERNMENT

## 2021-11-24 VITALS
BODY MASS INDEX: 45.71 KG/M2 | TEMPERATURE: 98.3 F | HEIGHT: 66 IN | SYSTOLIC BLOOD PRESSURE: 130 MMHG | OXYGEN SATURATION: 90 % | DIASTOLIC BLOOD PRESSURE: 78 MMHG | WEIGHT: 284.4 LBS | HEART RATE: 98 BPM | RESPIRATION RATE: 16 BRPM

## 2021-11-24 DIAGNOSIS — R05.9 COUGH: ICD-10-CM

## 2021-11-24 DIAGNOSIS — R06.02 SHORTNESS OF BREATH: ICD-10-CM

## 2021-11-24 LAB
COVID ORDER STATUS COVID19: NORMAL
EXTERNAL QUALITY CONTROL: NORMAL
SARS-COV+SARS-COV-2 AG RESP QL IA.RAPID: NEGATIVE

## 2021-11-24 PROCEDURE — 99214 OFFICE O/P EST MOD 30 MIN: CPT | Mod: CS,25 | Performed by: FAMILY MEDICINE

## 2021-11-24 PROCEDURE — 71046 X-RAY EXAM CHEST 2 VIEWS: CPT | Mod: TC | Performed by: NURSE PRACTITIONER

## 2021-11-24 PROCEDURE — 94640 AIRWAY INHALATION TREATMENT: CPT | Performed by: FAMILY MEDICINE

## 2021-11-24 PROCEDURE — 87426 SARSCOV CORONAVIRUS AG IA: CPT | Performed by: FAMILY MEDICINE

## 2021-11-24 PROCEDURE — U0003 INFECTIOUS AGENT DETECTION BY NUCLEIC ACID (DNA OR RNA); SEVERE ACUTE RESPIRATORY SYNDROME CORONAVIRUS 2 (SARS-COV-2) (CORONAVIRUS DISEASE [COVID-19]), AMPLIFIED PROBE TECHNIQUE, MAKING USE OF HIGH THROUGHPUT TECHNOLOGIES AS DESCRIBED BY CMS-2020-01-R: HCPCS

## 2021-11-24 PROCEDURE — U0005 INFEC AGEN DETEC AMPLI PROBE: HCPCS

## 2021-11-24 PROCEDURE — 94760 N-INVAS EAR/PLS OXIMETRY 1: CPT | Performed by: FAMILY MEDICINE

## 2021-11-24 RX ORDER — ALBUTEROL SULFATE 2.5 MG/3ML
2.5 SOLUTION RESPIRATORY (INHALATION) ONCE
Status: COMPLETED | OUTPATIENT
Start: 2021-11-24 | End: 2021-11-24

## 2021-11-24 RX ORDER — DOXYCYCLINE HYCLATE 100 MG
100 TABLET ORAL 2 TIMES DAILY
Qty: 10 TABLET | Refills: 0 | Status: SHIPPED | OUTPATIENT
Start: 2021-11-24 | End: 2021-11-29

## 2021-11-24 RX ORDER — ALBUTEROL SULFATE 90 UG/1
2 AEROSOL, METERED RESPIRATORY (INHALATION) EVERY 4 HOURS PRN
Qty: 1 EACH | Refills: 0 | Status: SHIPPED | OUTPATIENT
Start: 2021-11-24 | End: 2022-11-18 | Stop reason: SDUPTHER

## 2021-11-24 RX ORDER — PREDNISONE 20 MG/1
40 TABLET ORAL DAILY
Qty: 10 TABLET | Refills: 0 | Status: SHIPPED | OUTPATIENT
Start: 2021-11-24 | End: 2021-11-29

## 2021-11-24 RX ADMIN — ALBUTEROL SULFATE 2.5 MG: 2.5 SOLUTION RESPIRATORY (INHALATION) at 09:34

## 2021-11-24 ASSESSMENT — ENCOUNTER SYMPTOMS
WEIGHT LOSS: 0
EYE REDNESS: 0
MYALGIAS: 0
VOMITING: 0
EYE DISCHARGE: 0
NAUSEA: 0

## 2021-11-24 ASSESSMENT — FIBROSIS 4 INDEX: FIB4 SCORE: 1.364821619282671174

## 2021-11-24 NOTE — PROGRESS NOTES
"Subjective     Mingo Christiansen is a 56 y.o. male who presents with Congestion (cough, phlegm, sore throat, nasal congestion, x3 days )            3 days productive cough without blood in sputum. Nasal congestion. ST. No fever. SOB/wheeze. No PMH asthma/copd/pneumonia. No limb swelling. No loss of taste or smell. C19 vaccinated. No prior c19 infection. No other aggravating or alleviating factors.        Review of Systems   Constitutional: Negative for malaise/fatigue and weight loss.   Eyes: Negative for discharge and redness.   Gastrointestinal: Negative for nausea and vomiting.   Musculoskeletal: Negative for joint pain and myalgias.   Skin: Negative for itching and rash.               Objective     /78 (BP Location: Right arm, Patient Position: Sitting, BP Cuff Size: Adult)   Pulse 98   Temp 36.8 °C (98.3 °F) (Temporal)   Resp 16   Ht 1.676 m (5' 6\")   Wt (!) 129 kg (284 lb 6.4 oz)   SpO2 90%   BMI 45.90 kg/m²      Physical Exam  Constitutional:       General: He is not in acute distress.     Appearance: He is well-developed.   HENT:      Head: Normocephalic and atraumatic.   Eyes:      Conjunctiva/sclera: Conjunctivae normal.   Cardiovascular:      Rate and Rhythm: Normal rate and regular rhythm.      Heart sounds: Normal heart sounds. No murmur heard.      Pulmonary:      Effort: Pulmonary effort is normal.      Breath sounds: Wheezing present.   Skin:     General: Skin is warm and dry.      Findings: No rash.   Neurological:      Mental Status: He is alert and oriented to person, place, and time.                             Assessment & Plan        CXR: no acute cardiopulmonary process per radiology    Rapid COVID-19 testing negative    1. Cough  DX-CHEST-2 VIEWS    POCT SARS-COV Antigen JOSÉ MIGUEL (Symptomatic Only)    COVID/SARS CoV-2 PCR    doxycycline (VIBRAMYCIN) 100 MG Tab   2. Shortness of breath  albuterol (PROVENTIL) 2.5mg/3ml nebulizer solution 2.5 mg    DX-CHEST-2 VIEWS    POCT SARS-COV " Antigen JOSÉ MIGUEL (Symptomatic Only)    COVID/SARS CoV-2 PCR    predniSONE (DELTASONE) 20 MG Tab    albuterol 108 (90 Base) MCG/ACT Aero Soln inhalation aerosol     Differential diagnosis, natural history, supportive care, and indications for immediate follow-up discussed at length.     Given his pulse ox improved to 93% after albuterol nebulizer.  Subjective improvement in moving air better on exam.    He understands that this is a very high probability viral illness and will keep antibiotic contingent    Self isolate per CDC protocol.  Follow-up COVID-19 PCR testing.

## 2021-11-25 LAB
SARS-COV-2 RNA RESP QL NAA+PROBE: NOTDETECTED
SPECIMEN SOURCE: NORMAL

## 2022-07-31 ENCOUNTER — PATIENT MESSAGE (OUTPATIENT)
Dept: MEDICAL GROUP | Facility: PHYSICIAN GROUP | Age: 57
End: 2022-07-31

## 2022-07-31 DIAGNOSIS — M25.50 GENERALIZED JOINT PAIN: ICD-10-CM

## 2022-08-01 RX ORDER — ALLOPURINOL 300 MG/1
TABLET ORAL
Qty: 90 TABLET | Refills: 3 | Status: SHIPPED | OUTPATIENT
Start: 2022-08-01 | End: 2023-08-01 | Stop reason: SDUPTHER

## 2022-08-09 ENCOUNTER — OFFICE VISIT (OUTPATIENT)
Dept: MEDICAL GROUP | Facility: PHYSICIAN GROUP | Age: 57
End: 2022-08-09
Payer: COMMERCIAL

## 2022-08-09 VITALS
DIASTOLIC BLOOD PRESSURE: 74 MMHG | BODY MASS INDEX: 45 KG/M2 | WEIGHT: 280 LBS | HEIGHT: 66 IN | SYSTOLIC BLOOD PRESSURE: 126 MMHG | HEART RATE: 80 BPM | TEMPERATURE: 97.8 F | OXYGEN SATURATION: 94 %

## 2022-08-09 DIAGNOSIS — J34.89 NASAL LESION: ICD-10-CM

## 2022-08-09 DIAGNOSIS — M1A.09X0 CHRONIC GOUT OF MULTIPLE SITES, UNSPECIFIED CAUSE: ICD-10-CM

## 2022-08-09 DIAGNOSIS — I10 ESSENTIAL HYPERTENSION: ICD-10-CM

## 2022-08-09 PROCEDURE — 99214 OFFICE O/P EST MOD 30 MIN: CPT | Performed by: FAMILY MEDICINE

## 2022-08-09 RX ORDER — CARVEDILOL 12.5 MG/1
12.5 TABLET ORAL 2 TIMES DAILY WITH MEALS
Qty: 180 TABLET | Refills: 3 | Status: SHIPPED | OUTPATIENT
Start: 2022-08-09 | End: 2023-08-08 | Stop reason: SDUPTHER

## 2022-08-09 ASSESSMENT — PATIENT HEALTH QUESTIONNAIRE - PHQ9: CLINICAL INTERPRETATION OF PHQ2 SCORE: 0

## 2022-08-09 ASSESSMENT — FIBROSIS 4 INDEX: FIB4 SCORE: 1.39

## 2022-08-09 NOTE — ASSESSMENT & PLAN NOTE
Chronic issue  Has had a right nasal septal wall lesion that scabs and bleeds for several years. Not previously evaluated.

## 2022-08-09 NOTE — PROGRESS NOTES
"Subjective:     Chief Complaint   Patient presents with   • Medication Refill     Carvedilol       HPI:   Mingo presents today to discuss the following.    Essential hypertension  Chronic issue  On coreg 12.5mg BID  Due for refill    BP at home is in good rage    Chronic gout of multiple sites  Chronic issue  Stable on allopurinol     Nasal lesion  Chronic issue  Has had a right nasal septal wall lesion that scabs and bleeds for several years. Not previously evaluated.       Past Medical History:   Diagnosis Date   • Asthma    • Gout    • Hypertension        Current Outpatient Medications Ordered in Epic   Medication Sig Dispense Refill   • carvedilol (COREG) 12.5 MG Tab Take 1 Tablet by mouth 2 times a day with meals. 180 Tablet 3   • allopurinol (ZYLOPRIM) 300 MG Tab Take 1 tablet by mouth once daily 90 Tablet 3   • albuterol 108 (90 Base) MCG/ACT Aero Soln inhalation aerosol Inhale 2 Puffs every four hours as needed for Shortness of Breath. 1 Each 0     No current [facility]-ordered facility-administered medications on file.       Allergies:  Erythromycin    Health Maintenance: Completed    ROS:  Gen: no fevers/chills, no changes in weight  Eyes: no changes in vision  Pulm: no sob, no cough  CV: no chest pain, no palpitations  GI: no nausea/vomiting, no diarrhea      Objective:     Exam:  /74 (BP Location: Left arm, Patient Position: Sitting, BP Cuff Size: Adult)   Pulse 80   Temp 36.6 °C (97.8 °F) (Temporal)   Ht 1.676 m (5' 6\")   Wt (!) 127 kg (280 lb)   SpO2 94%   BMI 45.19 kg/m²  Body mass index is 45.19 kg/m².      Constitutional: Alert, no distress, well-groomed.  Skin: Warm, dry, good turgor, no rashes in visible areas.  Eye: Equal, round and reactive, conjunctiva clear, lids normal.  ENMT: Inspection of the nose shows evidence of a scabbing lesion on the right nasal septal wall with dried blood.  Lips without lesions, good dentition, moist mucous membranes.  Neck: Trachea midline, no masses, no " thyromegaly.  Respiratory: Unlabored respiratory effort, no cough.  MSK: Normal gait, moves all extremities.  Neuro: Grossly non-focal.   Psych: Alert and oriented x3, normal affect and mood.        Assessment & Plan:     57 y.o. male with the following -     1. Essential hypertension  Chronic, stable condition.  Continue with Coreg.  Refilling today.  - carvedilol (COREG) 12.5 MG Tab; Take 1 Tablet by mouth 2 times a day with meals.  Dispense: 180 Tablet; Refill: 3    2. Chronic gout of multiple sites, unspecified cause  Chronic condition.  Due for uric acid level.  Continue with allopurinol.  - URIC ACID; Future    3. Nasal lesion  Chronic condition.  Unchanged.  Patient has had a longstanding history of a nasal lesion that recurrently bleeds.  I would like to refer to ENT for further evaluation  - Referral to ENT      Return in about 6 months (around 2/9/2023).    Please note that this dictation was created using voice recognition software. I have made every reasonable attempt to correct obvious errors, but I expect that there are errors of grammar and possibly content that I did not discover before finalizing the note.

## 2022-08-22 ENCOUNTER — HOSPITAL ENCOUNTER (OUTPATIENT)
Dept: LAB | Facility: MEDICAL CENTER | Age: 57
End: 2022-08-22
Attending: FAMILY MEDICINE
Payer: COMMERCIAL

## 2022-08-22 DIAGNOSIS — M1A.09X0 CHRONIC GOUT OF MULTIPLE SITES, UNSPECIFIED CAUSE: ICD-10-CM

## 2022-08-22 LAB — URATE SERPL-MCNC: 5.5 MG/DL (ref 2.5–8.3)

## 2022-08-22 PROCEDURE — 84550 ASSAY OF BLOOD/URIC ACID: CPT

## 2022-08-22 PROCEDURE — 36415 COLL VENOUS BLD VENIPUNCTURE: CPT

## 2022-09-21 ENCOUNTER — OFFICE VISIT (OUTPATIENT)
Dept: CARDIOLOGY | Facility: MEDICAL CENTER | Age: 57
End: 2022-09-21
Payer: COMMERCIAL

## 2022-09-21 VITALS
BODY MASS INDEX: 44.39 KG/M2 | HEIGHT: 66 IN | DIASTOLIC BLOOD PRESSURE: 82 MMHG | OXYGEN SATURATION: 95 % | WEIGHT: 276.2 LBS | SYSTOLIC BLOOD PRESSURE: 130 MMHG | HEART RATE: 92 BPM | RESPIRATION RATE: 14 BRPM

## 2022-09-21 DIAGNOSIS — I87.2 VENOUS INSUFFICIENCY: ICD-10-CM

## 2022-09-21 DIAGNOSIS — M25.512 ACUTE PAIN OF LEFT SHOULDER: ICD-10-CM

## 2022-09-21 DIAGNOSIS — I10 ESSENTIAL HYPERTENSION: ICD-10-CM

## 2022-09-21 LAB — EKG IMPRESSION: NORMAL

## 2022-09-21 PROCEDURE — 99214 OFFICE O/P EST MOD 30 MIN: CPT | Performed by: INTERNAL MEDICINE

## 2022-09-21 PROCEDURE — 93000 ELECTROCARDIOGRAM COMPLETE: CPT | Performed by: INTERNAL MEDICINE

## 2022-09-21 ASSESSMENT — ENCOUNTER SYMPTOMS
DIZZINESS: 0
PND: 0
WEIGHT GAIN: 0
SYNCOPE: 0
IRREGULAR HEARTBEAT: 0
DECREASED APPETITE: 0
FEVER: 0
ALTERED MENTAL STATUS: 0
WEIGHT LOSS: 0
DIARRHEA: 0
COUGH: 0
SHORTNESS OF BREATH: 0
FLANK PAIN: 0
ABDOMINAL PAIN: 0
BACK PAIN: 0
NAUSEA: 0
HEARTBURN: 0
NEAR-SYNCOPE: 0
VOMITING: 0
PALPITATIONS: 0
ORTHOPNEA: 0
CLAUDICATION: 0
DYSPNEA ON EXERTION: 0
DEPRESSION: 0
BLURRED VISION: 0
CONSTIPATION: 0

## 2022-09-21 ASSESSMENT — FIBROSIS 4 INDEX: FIB4 SCORE: 1.39

## 2022-09-21 NOTE — PROGRESS NOTES
Cardiology Note    Chief Complaint   Patient presents with    Hypertension     F/V Dx: Essential hypertension         History of Present Illness: Mingo Christiansen is a 57 y.o. male PMH HTN who presents for follow up.     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 Standing the entire day. Noticed about a month ago left shoulder pain. Also separately noticed ankle and foot swelling. Describes swelling worse end of the day and better at start. Denies chest pain/pressure. Nonexertional shoulder symptoms. Shoulder pain exacerbated with moving arm. No other cardiac complaints. Compliant with medications and denies adverse effects.    Review of Systems   Constitutional: Negative for decreased appetite, fever, malaise/fatigue, weight gain and weight loss.   HENT:  Negative for congestion and nosebleeds.    Eyes:  Negative for blurred vision.   Cardiovascular:  Negative for chest pain, claudication, dyspnea on exertion, irregular heartbeat, leg swelling, near-syncope, orthopnea, palpitations, paroxysmal nocturnal dyspnea and syncope.   Respiratory:  Negative for cough and shortness of breath.    Endocrine: Negative for cold intolerance and heat intolerance.   Skin:  Negative for rash.   Musculoskeletal:  Negative for back pain.   Gastrointestinal:  Negative for abdominal pain, constipation, diarrhea, heartburn, melena, nausea and vomiting.   Genitourinary:  Negative for dysuria, flank pain and hematuria.   Neurological:  Negative for dizziness.   Psychiatric/Behavioral:  Negative for altered mental status and depression.        Past Medical History:   Diagnosis Date    Asthma     Gout     Hypertension          Past Surgical History:   Procedure Laterality Date    OTHER ORTHOPEDIC SURGERY  1995    L Knee         Current Outpatient Medications   Medication Sig Dispense Refill    Multiple Vitamins-Minerals (DAILY MULTI PO) Take  by mouth.      carvedilol (COREG) 12.5 MG Tab  "Take 1 Tablet by mouth 2 times a day with meals. 180 Tablet 3    allopurinol (ZYLOPRIM) 300 MG Tab Take 1 tablet by mouth once daily 90 Tablet 3    albuterol 108 (90 Base) MCG/ACT Aero Soln inhalation aerosol Inhale 2 Puffs every four hours as needed for Shortness of Breath. 1 Each 0     No current facility-administered medications for this visit.         Allergies   Allergen Reactions    Erythromycin Unspecified     Pt states \"Years ago he had a high temp\".         Family History   Problem Relation Age of Onset    Arthritis Father          Social History     Socioeconomic History    Marital status: Single     Spouse name: Not on file    Number of children: Not on file    Years of education: Not on file    Highest education level: Not on file   Occupational History    Not on file   Tobacco Use    Smoking status: Never    Smokeless tobacco: Never   Vaping Use    Vaping Use: Never used   Substance and Sexual Activity    Alcohol use: Not Currently     Alcohol/week: 3.6 oz     Types: 6 Cans of beer per week     Comment: weekends    Drug use: Never    Sexual activity: Not on file   Other Topics Concern     Service No    Blood Transfusions No    Caffeine Concern No    Occupational Exposure No    Hobby Hazards No    Sleep Concern No    Stress Concern No    Weight Concern No    Special Diet No    Back Care No    Exercise No    Bike Helmet Yes    Seat Belt Yes    Self-Exams No   Social History Narrative    Not on file     Social Determinants of Health     Financial Resource Strain: Not on file   Food Insecurity: Not on file   Transportation Needs: Not on file   Physical Activity: Not on file   Stress: Not on file   Social Connections: Not on file   Intimate Partner Violence: Not on file   Housing Stability: Not on file         Physical Exam:  Ambulatory Vitals  /82 (BP Location: Left arm, Patient Position: Sitting, BP Cuff Size: Adult)   Pulse 92   Resp 14   Ht 1.676 m (5' 6\")   Wt (!) 125 kg (276 lb 3.2 " "oz)   SpO2 95%    BP Readings from Last 4 Encounters:   09/21/22 130/82   08/09/22 126/74   11/24/21 130/78   07/01/21 120/68     Weight/BMI:   Vitals:    09/21/22 1053   BP: 130/82   Weight: (!) 125 kg (276 lb 3.2 oz)   Height: 1.676 m (5' 6\")    Body mass index is 44.58 kg/m².  Wt Readings from Last 4 Encounters:   09/21/22 (!) 125 kg (276 lb 3.2 oz)   08/09/22 (!) 127 kg (280 lb)   11/24/21 (!) 129 kg (284 lb 6.4 oz)   07/01/21 123 kg (271 lb 12.8 oz)       Physical Exam  Constitutional:       General: He is not in acute distress.  HENT:      Head: Normocephalic and atraumatic.   Eyes:      Conjunctiva/sclera: Conjunctivae normal.      Pupils: Pupils are equal, round, and reactive to light.   Neck:      Vascular: No JVD.   Cardiovascular:      Rate and Rhythm: Normal rate and regular rhythm.      Heart sounds: Normal heart sounds. No murmur heard.    No friction rub. No gallop.   Pulmonary:      Effort: Pulmonary effort is normal. No respiratory distress.      Breath sounds: Normal breath sounds. No wheezing or rales.   Chest:      Chest wall: No tenderness.   Abdominal:      General: Bowel sounds are normal. There is no distension.      Palpations: Abdomen is soft.   Musculoskeletal:      Cervical back: Normal range of motion and neck supple.   Skin:     General: Skin is warm and dry.   Neurological:      Mental Status: He is alert and oriented to person, place, and time.   Psychiatric:         Mood and Affect: Affect normal.         Judgment: Judgment normal.       Lab Data Review:  Lab Results   Component Value Date/Time    CHOLSTRLTOT 153 04/03/2021 07:41 AM    LDL 77 04/03/2021 07:41 AM    HDL 64 04/03/2021 07:41 AM    TRIGLYCERIDE 60 04/03/2021 07:41 AM       Lab Results   Component Value Date/Time    SODIUM 139 04/03/2021 07:41 AM    POTASSIUM 4.2 04/03/2021 07:41 AM    CHLORIDE 104 04/03/2021 07:41 AM    CO2 27 04/03/2021 07:41 AM    GLUCOSE 116 (H) 04/03/2021 07:41 AM    BUN 16 04/03/2021 07:41 AM    " CREATININE 0.84 04/03/2021 07:41 AM     CrCl cannot be calculated (Patient's most recent lab result is older than the maximum 7 days allowed.).  Lab Results   Component Value Date/Time    ALKPHOSPHAT 55 04/03/2021 07:41 AM    ASTSGOT 24 04/03/2021 07:41 AM    ALTSGPT 23 04/03/2021 07:41 AM    TBILIRUBIN 0.4 04/03/2021 07:41 AM      Lab Results   Component Value Date/Time    WBC 5.3 04/03/2021 07:41 AM     Lab Results   Component Value Date/Time    HBA1C 5.7 (H) 07/09/2021 03:52 PM     No components found for: TROP      Cardiac Imaging and Procedures Review:      EKG 9/21/22 interpreted by me sinus, short AL, no ischemic st/t changes    Mpi spect 2/2016   IMPRESSIONS   No evidence of significant jeopardized viable myocardium or prior myocardial    infarction.   Normal left ventricular size, ejection fraction, and wall motion.    TTE 2/2016  CONCLUSIONS  Normal left ventricular chamber size.  Normal left ventricular wall thickness.  Normal left ventricular systolic function.  Structurally normal aortic and mitral valve without significant   stenosis or regurgitation.  Normal pericardium without effusion.    Medical Decision Making:  Problem List Items Addressed This Visit       Essential hypertension    Relevant Orders    EKG (Completed)    Acute pain of left shoulder    Venous insufficiency     Noncardiac shoulder pain - follow up with Dr Miles to further discuss. If character changes to more typical angina can consider function testing with stress.    Edema suggestive of venous insufficiency by history. Trial compression socks/stockings.     It was my pleasure to meet with Mr. Christiansen.

## 2022-11-10 ENCOUNTER — HOSPITAL ENCOUNTER (OUTPATIENT)
Facility: MEDICAL CENTER | Age: 57
End: 2022-11-10
Attending: STUDENT IN AN ORGANIZED HEALTH CARE EDUCATION/TRAINING PROGRAM
Payer: COMMERCIAL

## 2022-11-10 ENCOUNTER — OFFICE VISIT (OUTPATIENT)
Dept: URGENT CARE | Facility: PHYSICIAN GROUP | Age: 57
End: 2022-11-10
Payer: COMMERCIAL

## 2022-11-10 VITALS
HEIGHT: 66 IN | SYSTOLIC BLOOD PRESSURE: 126 MMHG | TEMPERATURE: 97.7 F | RESPIRATION RATE: 16 BRPM | HEART RATE: 83 BPM | WEIGHT: 278 LBS | BODY MASS INDEX: 44.68 KG/M2 | DIASTOLIC BLOOD PRESSURE: 70 MMHG | OXYGEN SATURATION: 94 %

## 2022-11-10 DIAGNOSIS — R30.0 BURNING WITH URINATION: ICD-10-CM

## 2022-11-10 DIAGNOSIS — J02.0 STREP PHARYNGITIS: ICD-10-CM

## 2022-11-10 LAB
APPEARANCE UR: CLEAR
BILIRUB UR STRIP-MCNC: NEGATIVE MG/DL
COLOR UR AUTO: YELLOW
EXTERNAL QUALITY CONTROL: NORMAL
GLUCOSE UR STRIP.AUTO-MCNC: NEGATIVE MG/DL
INT CON NEG: NORMAL
INT CON NEG: NORMAL
INT CON POS: NORMAL
INT CON POS: NORMAL
KETONES UR STRIP.AUTO-MCNC: NEGATIVE MG/DL
LEUKOCYTE ESTERASE UR QL STRIP.AUTO: NEGATIVE
NITRITE UR QL STRIP.AUTO: NEGATIVE
PH UR STRIP.AUTO: 5.5 [PH] (ref 5–8)
PROT UR QL STRIP: NEGATIVE MG/DL
RBC UR QL AUTO: NEGATIVE
S PYO AG THROAT QL: POSITIVE
SARS-COV+SARS-COV-2 AG RESP QL IA.RAPID: NEGATIVE
SP GR UR STRIP.AUTO: 1.02
UROBILINOGEN UR STRIP-MCNC: NORMAL MG/DL

## 2022-11-10 PROCEDURE — 87086 URINE CULTURE/COLONY COUNT: CPT

## 2022-11-10 PROCEDURE — 81002 URINALYSIS NONAUTO W/O SCOPE: CPT | Performed by: STUDENT IN AN ORGANIZED HEALTH CARE EDUCATION/TRAINING PROGRAM

## 2022-11-10 PROCEDURE — 87426 SARSCOV CORONAVIRUS AG IA: CPT | Performed by: STUDENT IN AN ORGANIZED HEALTH CARE EDUCATION/TRAINING PROGRAM

## 2022-11-10 PROCEDURE — 99204 OFFICE O/P NEW MOD 45 MIN: CPT | Mod: CS | Performed by: STUDENT IN AN ORGANIZED HEALTH CARE EDUCATION/TRAINING PROGRAM

## 2022-11-10 PROCEDURE — 87880 STREP A ASSAY W/OPTIC: CPT | Performed by: STUDENT IN AN ORGANIZED HEALTH CARE EDUCATION/TRAINING PROGRAM

## 2022-11-10 RX ORDER — AMOXICILLIN 500 MG/1
1000 CAPSULE ORAL DAILY
Qty: 20 CAPSULE | Refills: 0 | Status: SHIPPED | OUTPATIENT
Start: 2022-11-10 | End: 2022-11-20

## 2022-11-10 ASSESSMENT — FIBROSIS 4 INDEX: FIB4 SCORE: 1.39

## 2022-11-10 NOTE — LETTER
[facility] [place]  [address]  [place] [id number]     November 10, 2022    Patient: Mingo Christiansen   YOB: 1965   Date of Visit: 11/10/2022       To Whom It May Concern:    Mingo Christiansen was seen and treated in our department on 11/10/2022.  Patient is excuse from work on 11/9/2022 through 11/10/2022.    Sincerely,     Alexi Brown P.A.-C.

## 2022-11-10 NOTE — PROGRESS NOTES
"Subjective:   Mingo Christiansen is a 57 y.o. male who presents for Sore Throat (Sx began Sunday, cough ) and UTI      HPI:  Pleasant 57-year-old male presents to clinic for 5 days of sore throat and a dry cough.  He also reports some mild intermittent burning with urination and is concerned for possible UTI.  Patient denies any known history of UTIs.  He is able to maintain adequate oral intake of fluids and solids.  He states he has been using some Robitussin and naproxen with some relief in his symptoms.  Denies fever, chills, sinus pain, sinus congestion, ear pain, chest pain, palpitations, sputum production, shortness of breath, nausea, vomiting, abdominal pain, diarrhea, hematuria, increased urinary frequency, flank pain, back pain, myalgias, dizziness, or headache.  Patient states that he has not had any unprotected sex.  He is not concerned for STIs.  He denies any penile lesions, testicular pain, or penile discharge.    Medications:    albuterol Aers  allopurinol Tabs  carvedilol Tabs  DAILY MULTI PO    Allergies: Erythromycin    Problem List: Mingo Christiansen does not have any pertinent problems on file.    Surgical History:  Past Surgical History:   Procedure Laterality Date    OTHER ORTHOPEDIC SURGERY  1995    L Knee       Past Social Hx: Migno Christiansen  reports that he has never smoked. He has never used smokeless tobacco. He reports that he does not currently use alcohol after a past usage of about 3.6 oz per week. He reports that he does not use drugs.     Past Family Hx:  Mingo Christiansen family history includes Arthritis in his father.     Problem list, medications, and allergies reviewed by myself today in Epic.     Objective:     /70 (BP Location: Right arm, Patient Position: Sitting, BP Cuff Size: Large adult)   Pulse 83   Temp 36.5 °C (97.7 °F) (Temporal)   Resp 16   Ht 1.676 m (5' 6\")   Wt (!) 126 kg (278 lb)   SpO2 94%   BMI 44.87 kg/m²     Physical " Exam  Vitals reviewed.   Constitutional:       General: He is not in acute distress.     Appearance: Normal appearance.   HENT:      Head: Normocephalic.      Right Ear: Tympanic membrane, ear canal and external ear normal.      Left Ear: Tympanic membrane, ear canal and external ear normal.      Nose: Nose normal.      Mouth/Throat:      Mouth: Mucous membranes are moist.      Pharynx: Oropharynx is clear. Uvula midline. Posterior oropharyngeal erythema present. No oropharyngeal exudate.      Tonsils: Tonsillar exudate present. No tonsillar abscesses. 0 on the right. 0 on the left.   Eyes:      Conjunctiva/sclera: Conjunctivae normal.      Pupils: Pupils are equal, round, and reactive to light.   Cardiovascular:      Rate and Rhythm: Normal rate and regular rhythm.      Pulses: Normal pulses.      Heart sounds: Normal heart sounds. No murmur heard.  Pulmonary:      Effort: Pulmonary effort is normal. No respiratory distress.      Breath sounds: Normal breath sounds. No stridor. No wheezing, rhonchi or rales.   Abdominal:      Tenderness: There is no abdominal tenderness. There is no right CVA tenderness or left CVA tenderness.   Musculoskeletal:      Cervical back: Normal range of motion.   Lymphadenopathy:      Cervical: No cervical adenopathy.   Skin:     General: Skin is warm and dry.      Capillary Refill: Capillary refill takes less than 2 seconds.      Findings: No erythema, lesion or rash.   Neurological:      General: No focal deficit present.      Mental Status: He is alert and oriented to person, place, and time.     Lab Results/POC Test Results   Results for orders placed or performed in visit on 11/10/22   POCT Rapid Strep A   Result Value Ref Range    Rapid Strep Screen Positive     Internal Control Positive Valid     Internal Control Negative Valid    POCT Urinalysis   Result Value Ref Range    POC Color Yellow Negative    POC Appearance Clear Negative    POC Leukocyte Esterase Negative Negative     POC Nitrites Negative Negative    POC Urobiligen 0.2 E.U Negative (0.2) mg/dL    POC Protein Negative Negative mg/dL    POC Urine PH 5.5 5.0 - 8.0    POC Blood Negative Negative    POC Specific Gravity 1.020 <1.005 - >1.030    POC Ketones Negative Negative mg/dL    POC Bilirubin Negative Negative mg/dL    POC Glucose Negative Negative mg/dL   POCT SARS-COV Antigen JOSÉ MIGUEL (Symptomatic only)   Result Value Ref Range    Internal  Valid     SARS-COV ANTIGEN JOSÉ MIGUEL Negative Negative, Indeterminate, None Detected, Not Detected, Detected, NotDetected, Valid, Invalid, Pass    Internal Control Positive Valid     Internal Control Negative Valid              Assessment/Plan:     Diagnosis and associated orders:     1. Strep pharyngitis  POCT Rapid Strep A    POCT SARS-COV Antigen JOSÉ MIGUEL (Symptomatic only)    amoxicillin (AMOXIL) 500 MG Cap      2. Burning with urination  POCT Urinalysis    URINE CULTURE(NEW)         Comments/MDM:     POCT strep a positive.  POCT COVID-19 negative.  Patient's presentation physical exam findings are consistent with a diagnosis of strep pharyngitis.  Patient will be treated with amoxicillin.  He denies known allergy to this medication.  Advised him that he may use ibuprofen/Tylenol at home, may use warm salt water gargles, home humidifier, nasal saline spray, and plenty of oral hydration.  Patient's second complaint is intermittent burning with urination over the past few days.  Urinalysis shows no abnormal findings.  No signs of infection or kidney stones.  No CVA tenderness or abdominal pain.  Discussed with patient that this may be interstitial cystitis and advised pushing more fluids.  We will do a urine culture for further evaluation.  He has no concern for STIs and does not want any additional testing for chlamydia or gonorrhea.  Patient will be contacted with any positive culture result which requires additional treatment.  ED/return precautions were given.         Differential  diagnosis, natural history, supportive care, and indications for immediate follow-up discussed.    Advised the patient to follow-up with the primary care physician for recheck, reevaluation, and consideration of further management.    Please note that this dictation was created using voice recognition software. I have made a reasonable attempt to correct obvious errors, but I expect that there are errors of grammar and possibly content that I did not discover before finalizing the note.    Electronically signed by Alexi Brown PA-C.

## 2022-11-12 LAB
BACTERIA UR CULT: NORMAL
SIGNIFICANT IND 70042: NORMAL
SITE SITE: NORMAL
SOURCE SOURCE: NORMAL

## 2022-11-18 ENCOUNTER — OFFICE VISIT (OUTPATIENT)
Dept: URGENT CARE | Facility: CLINIC | Age: 57
End: 2022-11-18
Payer: COMMERCIAL

## 2022-11-18 VITALS
BODY MASS INDEX: 44.68 KG/M2 | OXYGEN SATURATION: 99 % | HEIGHT: 66 IN | WEIGHT: 278 LBS | SYSTOLIC BLOOD PRESSURE: 130 MMHG | TEMPERATURE: 97.2 F | RESPIRATION RATE: 20 BRPM | HEART RATE: 68 BPM | DIASTOLIC BLOOD PRESSURE: 80 MMHG

## 2022-11-18 DIAGNOSIS — R06.02 SOB (SHORTNESS OF BREATH): ICD-10-CM

## 2022-11-18 DIAGNOSIS — R06.02 SHORTNESS OF BREATH: ICD-10-CM

## 2022-11-18 DIAGNOSIS — J10.1 INFLUENZA B: ICD-10-CM

## 2022-11-18 DIAGNOSIS — R50.9 FEVER, UNSPECIFIED FEVER CAUSE: ICD-10-CM

## 2022-11-18 DIAGNOSIS — J22 LOWER RESPIRATORY INFECTION: ICD-10-CM

## 2022-11-18 LAB
EXTERNAL QUALITY CONTROL: NORMAL
FLUAV+FLUBV AG SPEC QL IA: NORMAL
INT CON NEG: NORMAL
INT CON POS: NORMAL
S PYO AG THROAT QL: NEGATIVE
SARS-COV+SARS-COV-2 AG RESP QL IA.RAPID: NEGATIVE

## 2022-11-18 PROCEDURE — 94640 AIRWAY INHALATION TREATMENT: CPT

## 2022-11-18 PROCEDURE — 87804 INFLUENZA ASSAY W/OPTIC: CPT

## 2022-11-18 PROCEDURE — 99213 OFFICE O/P EST LOW 20 MIN: CPT | Mod: CS,25

## 2022-11-18 PROCEDURE — 87880 STREP A ASSAY W/OPTIC: CPT

## 2022-11-18 PROCEDURE — 87426 SARSCOV CORONAVIRUS AG IA: CPT

## 2022-11-18 RX ORDER — IPRATROPIUM BROMIDE AND ALBUTEROL SULFATE 2.5; .5 MG/3ML; MG/3ML
3 SOLUTION RESPIRATORY (INHALATION) ONCE
Status: COMPLETED | OUTPATIENT
Start: 2022-11-18 | End: 2022-11-18

## 2022-11-18 RX ORDER — PREDNISONE 20 MG/1
40 TABLET ORAL DAILY
Qty: 10 TABLET | Refills: 0 | Status: SHIPPED | OUTPATIENT
Start: 2022-11-18 | End: 2022-11-23

## 2022-11-18 RX ORDER — ALBUTEROL SULFATE 90 UG/1
2 AEROSOL, METERED RESPIRATORY (INHALATION) EVERY 4 HOURS PRN
Qty: 1 EACH | Refills: 0 | Status: SHIPPED | OUTPATIENT
Start: 2022-11-18 | End: 2023-08-08

## 2022-11-18 RX ORDER — DOXYCYCLINE HYCLATE 100 MG
100 TABLET ORAL 2 TIMES DAILY
Qty: 14 TABLET | Refills: 0 | Status: SHIPPED | OUTPATIENT
Start: 2022-11-18 | End: 2022-11-25

## 2022-11-18 RX ADMIN — IPRATROPIUM BROMIDE AND ALBUTEROL SULFATE 3 ML: 2.5; .5 SOLUTION RESPIRATORY (INHALATION) at 18:51

## 2022-11-18 ASSESSMENT — FIBROSIS 4 INDEX: FIB4 SCORE: 1.39

## 2022-11-18 NOTE — LETTER
November 18, 2022    To Whom It May Concern:    This is confirmation that Mingo Christiansen was seen in urgent care for illness today. Please excuse him from work until Monday, 11/21/22. He tested positive for Influenza B. If you have any questions please do not hesitate to call me at the phone number listed below.    Sincerely,      [place] [employer]  (Electronically Signed)  [phone]

## 2022-11-19 NOTE — PROGRESS NOTES
Subjective:   Mingo Christiansen is a 57 y.o. male who presents for Cough (X 7 days, Coughing yellow phlegm , SOB. Negative home covid test on 11/10/22. Taking Amoxicillin for strep.)      HPI:    Patient presents to urgent care with complaints of productive cough, shortness of breath, chest tightness. Patient reports he tested positive for strep on 11/10, has been taking amoxil. But he reports he has been feeling worse, in that he feels like his cough has changed. He reports increased frequency, intensity. He states he has been coughing up yellow sputum. He also reports fatigue and no energy. He denies presence of rash, cyanosis, swelling of lips, tongue, throat, difficulty breathing.. Denies chest pain, dizziness, leg swelling. Onset was shortly after he was seen for his strep diagnosis. Mild improvement with otc cough and cold medications, his  albuterol inhalers. Denies ear pain, congestion, sore throat. He reports his strep symptoms have slightly improved. Endorses ever, chills, night sweats, nausea,. Denies vomiting, diarrhea. Denies vaccination for influenza this year.     ROS As above in HPI    Medications:    Current Outpatient Medications on File Prior to Visit   Medication Sig Dispense Refill    amoxicillin (AMOXIL) 500 MG Cap Take 2 Capsules by mouth every day for 10 days. 20 Capsule 0    Multiple Vitamins-Minerals (DAILY MULTI PO) Take  by mouth.      carvedilol (COREG) 12.5 MG Tab Take 1 Tablet by mouth 2 times a day with meals. 180 Tablet 3    allopurinol (ZYLOPRIM) 300 MG Tab Take 1 tablet by mouth once daily 90 Tablet 3     No current facility-administered medications on file prior to visit.        Allergies:   Erythromycin    Problem List:   Patient Active Problem List   Diagnosis    Essential hypertension    Generalized joint pain    Chronic gout of multiple sites    Nasal lesion    Acute pain of left shoulder    Venous insufficiency        Surgical History:  Past Surgical History:  "  Procedure Laterality Date    OTHER ORTHOPEDIC SURGERY  1995    L Knee       Past Social Hx:   Social History     Tobacco Use    Smoking status: Never    Smokeless tobacco: Never   Vaping Use    Vaping Use: Never used   Substance Use Topics    Alcohol use: Yes     Alcohol/week: 3.6 oz     Types: 6 Cans of beer per week     Comment: weekends    Drug use: Never          Problem list, medications, and allergies reviewed by myself today in Epic.     Objective:     /80   Pulse 68   Temp 36.2 °C (97.2 °F) (Temporal)   Resp 20   Ht 1.676 m (5' 6\")   Wt (!) 126 kg (278 lb)   SpO2 99%   BMI 44.87 kg/m²     Physical Exam  Vitals and nursing note reviewed.   Constitutional:       General: He is not in acute distress.     Appearance: Normal appearance. He is not ill-appearing or diaphoretic.   HENT:      Head: Normocephalic and atraumatic.      Right Ear: Ear canal normal. Tympanic membrane is injected.      Left Ear: Ear canal normal. Tympanic membrane is injected.      Nose: Congestion and rhinorrhea present. Rhinorrhea is clear.      Right Sinus: No maxillary sinus tenderness or frontal sinus tenderness.      Left Sinus: No maxillary sinus tenderness or frontal sinus tenderness.      Mouth/Throat:      Mouth: Mucous membranes are moist.      Pharynx: Oropharynx is clear. Uvula midline. Posterior oropharyngeal erythema present. No pharyngeal swelling or oropharyngeal exudate.      Tonsils: No tonsillar exudate.   Eyes:      Conjunctiva/sclera: Conjunctivae normal.      Pupils: Pupils are equal, round, and reactive to light.   Cardiovascular:      Rate and Rhythm: Normal rate and regular rhythm.      Heart sounds: Normal heart sounds.   Pulmonary:      Effort: No respiratory distress.      Breath sounds: Normal air entry. No stridor. Examination of the right-middle field reveals decreased breath sounds and wheezing. Examination of the left-middle field reveals decreased breath sounds and wheezing. Examination of " the right-lower field reveals decreased breath sounds and wheezing. Examination of the left-lower field reveals decreased breath sounds and wheezing. Decreased breath sounds and wheezing present. No rhonchi or rales.   Chest:      Chest wall: Tenderness present.   Abdominal:      General: Bowel sounds are normal.      Palpations: Abdomen is soft.   Skin:     General: Skin is warm and dry.      Capillary Refill: Capillary refill takes less than 2 seconds.      Findings: No rash.   Neurological:      Mental Status: He is oriented to person, place, and time.       Assessment/Plan:       Results for orders placed or performed in visit on 11/18/22   POCT Rapid Strep A   Result Value Ref Range    Rapid Strep Screen Negative     Internal Control Positive Valid     Internal Control Negative Valid    POCT Influenza A/B   Result Value Ref Range    Rapid Influenza A-B Positive B     Internal Control Positive Valid     Internal Control Negative Valid    POCT SARS-COV Antigen JOSÉ MIGUEL (Symptomatic only)   Result Value Ref Range    Internal  Valid     SARS-COV ANTIGEN JOSÉ MIGUEL Negative Negative, Indeterminate, None Detected, Not Detected, Detected, NotDetected, Valid, Invalid, Pass    Internal Control Positive Valid     Internal Control Negative Valid        Diagnosis and associated orders:   1. Influenza B  - predniSONE (DELTASONE) 20 MG Tab; Take 2 Tablets by mouth every day for 5 days.  Dispense: 10 Tablet; Refill: 0  - doxycycline (VIBRAMYCIN) 100 MG Tab; Take 1 Tablet by mouth 2 times a day for 7 days.  Dispense: 14 Tablet; Refill: 0    2. Fever, unspecified fever cause  - POCT Rapid Strep A  - POCT Influenza A/B  - POCT SARS-COV Antigen JOSÉ MIGUEL (Symptomatic only)    3. SOB (shortness of breath)  - ipratropium-albuterol (DUONEB) nebulizer solution    4. Shortness of breath  - albuterol 108 (90 Base) MCG/ACT Aero Soln inhalation aerosol; Inhale 2 Puffs every four hours as needed for Shortness of Breath.  Dispense: 1 Each;  Refill: 0    5. Lower respiratory infection  - predniSONE (DELTASONE) 20 MG Tab; Take 2 Tablets by mouth every day for 5 days.  Dispense: 10 Tablet; Refill: 0  - doxycycline (VIBRAMYCIN) 100 MG Tab; Take 1 Tablet by mouth 2 times a day for 7 days.  Dispense: 14 Tablet; Refill: 0      Comments/MDM:     Patient tested positive for Influenza B in office today. Rapid strep and covid are negative. Lungs are CTA s/p nebulizer treatment, patient reports improvement in chest tightness and shortness of breath. As patient estimates symptoms started shortly after being diagnosed with strep, he is likely outside of the timeframe for influenza antivirals. He is on day 8 of 10 of amoxil, however, will start him on doxycycline as there is concern the patient may be developing a lower respiratory infection given his coinfection and productive cough.   Supportive measures encouraged  Return to urgent care prn if new or worsening sx or if there is no improvement in condition prn.    Educated in Red flags and indications to immediately call 911 or present to the Emergency Department.   Advised the patient to follow-up with the primary care physician for recheck, reevaluation, and consideration of further management.        Pt is clinically stable at today's acute urgent care visit.  No acute distress noted. Appropriate for outpatient management at this time.       Discussed DDx, management options (risks,benefits, and alternatives to planned treatment), natural progression and supportive care.  Expressed understanding and the treatment plan was agreed upon. Questions were encouraged and answered     Please note that this dictation was created using voice recognition software. I have made a reasonable attempt to correct obvious errors, but I expect that there are errors of grammar and possibly content that I did not discover before finalizing the note.    This note was electronically signed by Jessa Calhoun, DANYELLE

## 2023-02-06 ENCOUNTER — OFFICE VISIT (OUTPATIENT)
Dept: MEDICAL GROUP | Facility: PHYSICIAN GROUP | Age: 58
End: 2023-02-06
Payer: COMMERCIAL

## 2023-02-06 VITALS
DIASTOLIC BLOOD PRESSURE: 78 MMHG | TEMPERATURE: 97.3 F | BODY MASS INDEX: 44.2 KG/M2 | SYSTOLIC BLOOD PRESSURE: 124 MMHG | HEIGHT: 66 IN | WEIGHT: 275 LBS | HEART RATE: 84 BPM | OXYGEN SATURATION: 97 %

## 2023-02-06 DIAGNOSIS — M54.42 CHRONIC BILATERAL LOW BACK PAIN WITH BILATERAL SCIATICA: ICD-10-CM

## 2023-02-06 DIAGNOSIS — M54.41 CHRONIC BILATERAL LOW BACK PAIN WITH BILATERAL SCIATICA: ICD-10-CM

## 2023-02-06 DIAGNOSIS — G89.29 CHRONIC BILATERAL LOW BACK PAIN WITH BILATERAL SCIATICA: ICD-10-CM

## 2023-02-06 DIAGNOSIS — I10 ESSENTIAL HYPERTENSION: ICD-10-CM

## 2023-02-06 PROCEDURE — 99214 OFFICE O/P EST MOD 30 MIN: CPT | Performed by: FAMILY MEDICINE

## 2023-02-06 RX ORDER — METHOCARBAMOL 750 MG/1
750 TABLET, FILM COATED ORAL 4 TIMES DAILY
Qty: 56 TABLET | Refills: 3 | Status: SHIPPED | OUTPATIENT
Start: 2023-02-06 | End: 2023-02-20

## 2023-02-06 ASSESSMENT — PATIENT HEALTH QUESTIONNAIRE - PHQ9: CLINICAL INTERPRETATION OF PHQ2 SCORE: 0

## 2023-02-06 ASSESSMENT — FIBROSIS 4 INDEX: FIB4 SCORE: 1.39

## 2023-02-06 NOTE — PROGRESS NOTES
"Subjective:     Chief Complaint   Patient presents with    Other     Questions only for PCP       HPI:   Mingo presents today to discuss the following.    Chronic bilateral low back pain with bilateral sciatica  Chronic issue  Having some leg heaviness with associated back pain  Has taken NSAIDs which helps    Denies any incontinence     Essential hypertension  Chronic issue  stable    Past Medical History:   Diagnosis Date    Asthma     Gout     Hypertension        Current Outpatient Medications Ordered in Epic   Medication Sig Dispense Refill    methocarbamol (ROBAXIN) 750 MG Tab Take 1 Tablet by mouth 4 times a day for 14 days. 56 Tablet 3    albuterol 108 (90 Base) MCG/ACT Aero Soln inhalation aerosol Inhale 2 Puffs every four hours as needed for Shortness of Breath. 1 Each 0    Multiple Vitamins-Minerals (DAILY MULTI PO) Take  by mouth.      carvedilol (COREG) 12.5 MG Tab Take 1 Tablet by mouth 2 times a day with meals. 180 Tablet 3    allopurinol (ZYLOPRIM) 300 MG Tab Take 1 tablet by mouth once daily 90 Tablet 3     No current Epic-ordered facility-administered medications on file.       Allergies:  Erythromycin    Health Maintenance: Completed    ROS:  Gen: no fevers/chills, no changes in weight  Eyes: no changes in vision  Pulm: no sob, no cough  CV: no chest pain, no palpitations  GI: no nausea/vomiting, no diarrhea      Objective:     Exam:  /78 (BP Location: Left arm, Patient Position: Sitting, BP Cuff Size: Adult)   Pulse 84   Temp 36.3 °C (97.3 °F) (Temporal)   Ht 1.676 m (5' 6\")   Wt 125 kg (275 lb)   SpO2 97%   BMI 44.39 kg/m²  Body mass index is 44.39 kg/m².      Constitutional: Alert, no distress, well-groomed.  Skin: Warm, dry, good turgor, no rashes in visible areas.  Eye: Equal, round and reactive, conjunctiva clear, lids normal.  ENMT: Lips without lesions, good dentition, moist mucous membranes.  Neck: Trachea midline, no masses, no thyromegaly.  Respiratory: Unlabored respiratory " effort, no cough.  MSK: Normal gait, moves all extremities.  Neuro: Grossly non-focal.   Psych: Alert and oriented x3, normal affect and mood.        Assessment & Plan:     57 y.o. male with the following -     1. Chronic bilateral low back pain with bilateral sciatica  Flaring condition.  I would like to do a trial of Robaxin at this time.  Offered physical therapy but he would like to hold off on this.  Continue with naproxen as needed.  - methocarbamol (ROBAXIN) 750 MG Tab; Take 1 Tablet by mouth 4 times a day for 14 days.  Dispense: 56 Tablet; Refill: 3    2. Essential hypertension  Chronic, stable condition.  Continue with prescribed regimen.      Return in about 6 months (around 8/6/2023).          Please note that this dictation was created using voice recognition software. I have made every reasonable attempt to correct obvious errors, but I expect that there are errors of grammar and possibly content that I did not discover before finalizing the note.

## 2023-02-06 NOTE — ASSESSMENT & PLAN NOTE
Chronic issue  Having some leg heaviness with associated back pain  Has taken NSAIDs which helps    Denies any incontinence

## 2023-05-29 ENCOUNTER — OFFICE VISIT (OUTPATIENT)
Dept: URGENT CARE | Facility: CLINIC | Age: 58
End: 2023-05-29
Payer: COMMERCIAL

## 2023-05-29 VITALS
OXYGEN SATURATION: 98 % | BODY MASS INDEX: 44.2 KG/M2 | SYSTOLIC BLOOD PRESSURE: 130 MMHG | DIASTOLIC BLOOD PRESSURE: 60 MMHG | TEMPERATURE: 97.4 F | HEIGHT: 66 IN | WEIGHT: 275 LBS | RESPIRATION RATE: 18 BRPM | HEART RATE: 86 BPM

## 2023-05-29 DIAGNOSIS — M54.50 LUMBAR BACK PAIN: ICD-10-CM

## 2023-05-29 DIAGNOSIS — M54.32 SCIATICA OF LEFT SIDE: ICD-10-CM

## 2023-05-29 LAB
APPEARANCE UR: CLEAR
BILIRUB UR STRIP-MCNC: NEGATIVE MG/DL
COLOR UR AUTO: YELLOW
GLUCOSE UR STRIP.AUTO-MCNC: NEGATIVE MG/DL
KETONES UR STRIP.AUTO-MCNC: NEGATIVE MG/DL
LEUKOCYTE ESTERASE UR QL STRIP.AUTO: NEGATIVE
NITRITE UR QL STRIP.AUTO: NEGATIVE
PH UR STRIP.AUTO: 5 [PH] (ref 5–8)
PROT UR QL STRIP: NEGATIVE MG/DL
RBC UR QL AUTO: NORMAL
SP GR UR STRIP.AUTO: 1.03
UROBILINOGEN UR STRIP-MCNC: 0.2 MG/DL

## 2023-05-29 PROCEDURE — 81002 URINALYSIS NONAUTO W/O SCOPE: CPT | Performed by: NURSE PRACTITIONER

## 2023-05-29 PROCEDURE — 3078F DIAST BP <80 MM HG: CPT | Performed by: NURSE PRACTITIONER

## 2023-05-29 PROCEDURE — 3075F SYST BP GE 130 - 139MM HG: CPT | Performed by: NURSE PRACTITIONER

## 2023-05-29 PROCEDURE — 99214 OFFICE O/P EST MOD 30 MIN: CPT | Performed by: NURSE PRACTITIONER

## 2023-05-29 RX ORDER — IBUPROFEN 800 MG/1
800 TABLET ORAL EVERY 8 HOURS PRN
Qty: 90 TABLET | Refills: 0 | Status: SHIPPED | OUTPATIENT
Start: 2023-05-29 | End: 2023-08-08

## 2023-05-29 RX ORDER — GABAPENTIN 100 MG/1
100 CAPSULE ORAL 3 TIMES DAILY
Qty: 90 CAPSULE | Refills: 0 | Status: SHIPPED | OUTPATIENT
Start: 2023-05-29 | End: 2023-08-08

## 2023-05-29 RX ORDER — METHOCARBAMOL 750 MG/1
750 TABLET, FILM COATED ORAL 4 TIMES DAILY
Qty: 28 TABLET | Refills: 0 | Status: SHIPPED | OUTPATIENT
Start: 2023-05-29 | End: 2023-08-08

## 2023-05-29 NOTE — PROGRESS NOTES
Subjective     Mingo BURT is a 57 y.o. male who presents with Back Pain (X3 weeks. Left side. OTC not working. )            HPI  New problem.  Patient is a 57-year-old male who presents with a 3-week history of left-sided back pain.  He reports over the past day or so this pain has become worse to where it is not responding to over-the-counter anti-inflammatories.  He denies any radiation of pain, abdominal pain, chest pain, bowel or bladder issues, or pelvic paresthesia.  He does have a history of chronic bilateral back pain with sciatica.  He has been taking over-the-counter Tylenol and ibuprofen with minimal relief of symptoms  Erythromycin  Current Outpatient Medications on File Prior to Visit   Medication Sig Dispense Refill    albuterol 108 (90 Base) MCG/ACT Aero Soln inhalation aerosol Inhale 2 Puffs every four hours as needed for Shortness of Breath. 1 Each 0    Multiple Vitamins-Minerals (DAILY MULTI PO) Take  by mouth.      carvedilol (COREG) 12.5 MG Tab Take 1 Tablet by mouth 2 times a day with meals. 180 Tablet 3    allopurinol (ZYLOPRIM) 300 MG Tab Take 1 tablet by mouth once daily 90 Tablet 3     No current facility-administered medications on file prior to visit.     Social History     Socioeconomic History    Marital status: Single     Spouse name: Not on file    Number of children: Not on file    Years of education: Not on file    Highest education level: Not on file   Occupational History    Not on file   Tobacco Use    Smoking status: Never    Smokeless tobacco: Never   Vaping Use    Vaping Use: Never used   Substance and Sexual Activity    Alcohol use: Yes     Alcohol/week: 3.6 oz     Types: 6 Cans of beer per week     Comment: weekends    Drug use: Never    Sexual activity: Not on file   Other Topics Concern     Service No    Blood Transfusions No    Caffeine Concern No    Occupational Exposure No    Hobby Hazards No    Sleep Concern No    Stress Concern No    Weight Concern  "No    Special Diet No    Back Care No    Exercise No    Bike Helmet Yes    Seat Belt Yes    Self-Exams No   Social History Narrative    Not on file     Social Determinants of Health     Financial Resource Strain: Not on file   Food Insecurity: Not on file   Transportation Needs: Not on file   Physical Activity: Not on file   Stress: Not on file   Social Connections: Not on file   Intimate Partner Violence: Not on file   Housing Stability: Not on file     Breast Cancer-related family history is not on file.      Review of Systems   Constitutional:  Negative for chills and fever.   Cardiovascular:  Negative for chest pain.   Gastrointestinal:  Negative for abdominal pain and nausea.   Genitourinary:  Negative for dysuria.   Musculoskeletal:  Positive for back pain.   Neurological:  Negative for tingling, sensory change, focal weakness and weakness.              Objective     /60 (BP Location: Left arm, Patient Position: Sitting, BP Cuff Size: Adult long)   Pulse 86   Temp 36.3 °C (97.4 °F) (Temporal)   Resp 18   Ht 1.676 m (5' 6\")   Wt 125 kg (275 lb)   SpO2 98%   BMI 44.39 kg/m²      Physical Exam  Constitutional:       General: He is not in acute distress.     Appearance: He is well-developed.   Cardiovascular:      Rate and Rhythm: Normal rate and regular rhythm.      Heart sounds: Normal heart sounds. No murmur heard.  Pulmonary:      Effort: Pulmonary effort is normal.      Breath sounds: Normal breath sounds.   Musculoskeletal:      Lumbar back: Spasms and tenderness present. No swelling. Decreased range of motion.   Neurological:      Mental Status: He is alert.      Sensory: No sensory deficit.      Motor: No abnormal muscle tone.      Gait: Gait normal.      Deep Tendon Reflexes:      Reflex Scores:       Patellar reflexes are 2+ on the right side and 2+ on the left side.                            Assessment & Plan        1. Lumbar back pain  POCT Urinalysis    methocarbamol (ROBAXIN) 750 MG Tab "    ibuprofen (MOTRIN) 800 MG Tab      2. Sciatica of left side  gabapentin (NEURONTIN) 100 MG Cap        U/a negative  Trial of gabapentin.  Robaxin (sedation precautions given).  Ibuprofen.  Ice/heat.  Differential diagnosis, natural history, supportive care, and indications for immediate follow-up were discussed.

## 2023-05-29 NOTE — LETTER
May 29, 2023        DANIA MAIN CARMEL  [address] [place] [address]        Dania was seen in our clinic today and he is excused from work tonite. Thank you.  If you have any questions or concerns, please don't hesitate to call.        Sincerely,        AG [place].    Electronically Signed

## 2023-06-05 ASSESSMENT — ENCOUNTER SYMPTOMS
CHILLS: 0
TINGLING: 0
ABDOMINAL PAIN: 0
FEVER: 0
BACK PAIN: 1
FOCAL WEAKNESS: 0
SENSORY CHANGE: 0
WEAKNESS: 0
NAUSEA: 0

## 2023-06-14 ENCOUNTER — HOSPITAL ENCOUNTER (OUTPATIENT)
Dept: RADIOLOGY | Facility: MEDICAL CENTER | Age: 58
End: 2023-06-14
Attending: PHYSICIAN ASSISTANT
Payer: COMMERCIAL

## 2023-06-14 DIAGNOSIS — M54.16 LUMBAR RADICULOPATHY: ICD-10-CM

## 2023-06-14 PROCEDURE — 72148 MRI LUMBAR SPINE W/O DYE: CPT

## 2023-07-07 ENCOUNTER — PATIENT MESSAGE (OUTPATIENT)
Dept: MEDICAL GROUP | Facility: PHYSICIAN GROUP | Age: 58
End: 2023-07-07
Payer: COMMERCIAL

## 2023-07-07 DIAGNOSIS — Z00.00 WELL ADULT EXAM: ICD-10-CM

## 2023-07-07 DIAGNOSIS — Z12.5 SCREENING FOR MALIGNANT NEOPLASM OF PROSTATE: ICD-10-CM

## 2023-08-01 ENCOUNTER — PATIENT MESSAGE (OUTPATIENT)
Dept: MEDICAL GROUP | Facility: PHYSICIAN GROUP | Age: 58
End: 2023-08-01
Payer: COMMERCIAL

## 2023-08-01 DIAGNOSIS — M25.50 GENERALIZED JOINT PAIN: ICD-10-CM

## 2023-08-01 RX ORDER — ALLOPURINOL 300 MG/1
TABLET ORAL
Qty: 90 TABLET | Refills: 0 | Status: SHIPPED | OUTPATIENT
Start: 2023-08-01 | End: 2023-08-08 | Stop reason: SDUPTHER

## 2023-08-03 ENCOUNTER — HOSPITAL ENCOUNTER (OUTPATIENT)
Dept: LAB | Facility: MEDICAL CENTER | Age: 58
End: 2023-08-03
Attending: FAMILY MEDICINE
Payer: COMMERCIAL

## 2023-08-03 ENCOUNTER — TELEPHONE (OUTPATIENT)
Dept: HEALTH INFORMATION MANAGEMENT | Facility: OTHER | Age: 58
End: 2023-08-03
Payer: COMMERCIAL

## 2023-08-03 DIAGNOSIS — Z12.5 SCREENING FOR MALIGNANT NEOPLASM OF PROSTATE: ICD-10-CM

## 2023-08-03 DIAGNOSIS — Z00.00 WELL ADULT EXAM: ICD-10-CM

## 2023-08-03 LAB
CHOLEST SERPL-MCNC: 169 MG/DL (ref 100–199)
ERYTHROCYTE [DISTWIDTH] IN BLOOD BY AUTOMATED COUNT: 51.4 FL (ref 35.9–50)
EST. AVERAGE GLUCOSE BLD GHB EST-MCNC: 117 MG/DL
FASTING STATUS PATIENT QL REPORTED: NORMAL
HBA1C MFR BLD: 5.7 % (ref 4–5.6)
HCT VFR BLD AUTO: 46.1 % (ref 42–52)
HDLC SERPL-MCNC: 66 MG/DL
HGB BLD-MCNC: 15 G/DL (ref 14–18)
LDLC SERPL CALC-MCNC: 92 MG/DL
MCH RBC QN AUTO: 32.8 PG (ref 27–33)
MCHC RBC AUTO-ENTMCNC: 32.5 G/DL (ref 32.3–36.5)
MCV RBC AUTO: 100.7 FL (ref 81.4–97.8)
PLATELET # BLD AUTO: 231 K/UL (ref 164–446)
PMV BLD AUTO: 10.1 FL (ref 9–12.9)
PSA SERPL-MCNC: 0.39 NG/ML (ref 0–4)
RBC # BLD AUTO: 4.58 M/UL (ref 4.7–6.1)
TRIGL SERPL-MCNC: 53 MG/DL (ref 0–149)
URATE SERPL-MCNC: 5.2 MG/DL (ref 2.5–8.3)
WBC # BLD AUTO: 6.7 K/UL (ref 4.8–10.8)

## 2023-08-03 PROCEDURE — 84153 ASSAY OF PSA TOTAL: CPT

## 2023-08-03 PROCEDURE — 85027 COMPLETE CBC AUTOMATED: CPT

## 2023-08-03 PROCEDURE — 84550 ASSAY OF BLOOD/URIC ACID: CPT

## 2023-08-03 PROCEDURE — 80061 LIPID PANEL: CPT

## 2023-08-03 PROCEDURE — 36415 COLL VENOUS BLD VENIPUNCTURE: CPT

## 2023-08-03 PROCEDURE — 83036 HEMOGLOBIN GLYCOSYLATED A1C: CPT

## 2023-08-08 ENCOUNTER — OFFICE VISIT (OUTPATIENT)
Dept: MEDICAL GROUP | Facility: PHYSICIAN GROUP | Age: 58
End: 2023-08-08
Payer: COMMERCIAL

## 2023-08-08 VITALS
WEIGHT: 279.2 LBS | SYSTOLIC BLOOD PRESSURE: 108 MMHG | TEMPERATURE: 98.6 F | OXYGEN SATURATION: 93 % | BODY MASS INDEX: 44.87 KG/M2 | HEART RATE: 87 BPM | HEIGHT: 66 IN | DIASTOLIC BLOOD PRESSURE: 60 MMHG

## 2023-08-08 DIAGNOSIS — M25.50 GENERALIZED JOINT PAIN: ICD-10-CM

## 2023-08-08 DIAGNOSIS — I10 ESSENTIAL HYPERTENSION: ICD-10-CM

## 2023-08-08 DIAGNOSIS — Z00.00 WELL ADULT EXAM: ICD-10-CM

## 2023-08-08 PROCEDURE — 99396 PREV VISIT EST AGE 40-64: CPT | Performed by: FAMILY MEDICINE

## 2023-08-08 PROCEDURE — 3074F SYST BP LT 130 MM HG: CPT | Performed by: FAMILY MEDICINE

## 2023-08-08 PROCEDURE — 3078F DIAST BP <80 MM HG: CPT | Performed by: FAMILY MEDICINE

## 2023-08-08 RX ORDER — CARVEDILOL 12.5 MG/1
12.5 TABLET ORAL 2 TIMES DAILY WITH MEALS
Qty: 180 TABLET | Refills: 3 | Status: SHIPPED | OUTPATIENT
Start: 2023-08-08

## 2023-08-08 RX ORDER — ALLOPURINOL 300 MG/1
TABLET ORAL
Qty: 90 TABLET | Refills: 3 | Status: SHIPPED | OUTPATIENT
Start: 2023-08-08

## 2023-08-08 NOTE — PROGRESS NOTES
Subjective:     CC:   Chief Complaint   Patient presents with    Annual Exam       HPI:   Mingo Christiansen is a 58 y.o. male who presents for an annual exam. He is feeling well and has no complaints.    Health Maintenance  Diet: trying to work on it   Exercise: trying to keep active   Substance Abuse: none   Seat belts, bike helmet, gun safety discussed.  Sun protection used.    Cancer screening  Colorectal Cancer Screening: UTD      Prostate Cancer Screening/PSA: done     Infectious disease screening/Immunizations  --Immunizations:    UTD    He  has a past medical history of Asthma, Gout, and Hypertension.  He  has a past surgical history that includes other orthopedic surgery (1995).  Family History   Problem Relation Age of Onset    Arthritis Father      Social History     Tobacco Use    Smoking status: Never    Smokeless tobacco: Never   Vaping Use    Vaping Use: Never used   Substance Use Topics    Alcohol use: Yes     Alcohol/week: 3.6 oz     Types: 6 Cans of beer per week     Comment: weekends    Drug use: Never       Patient Active Problem List    Diagnosis Date Noted    Chronic bilateral low back pain with bilateral sciatica 02/06/2023    Acute pain of left shoulder 09/21/2022    Venous insufficiency 09/21/2022    Nasal lesion 08/09/2022    Chronic gout of multiple sites 07/01/2021    Generalized joint pain 01/13/2020    Essential hypertension 01/04/2018       Current Outpatient Medications   Medication Sig Dispense Refill    allopurinol (ZYLOPRIM) 300 MG Tab Take 1 tablet by mouth once daily 90 Tablet 0    Multiple Vitamins-Minerals (DAILY MULTI PO) Take  by mouth.      carvedilol (COREG) 12.5 MG Tab Take 1 Tablet by mouth 2 times a day with meals. 180 Tablet 3     No current facility-administered medications for this visit.    (including changes today)  Allergies: Erythromycin    Review of Systems   Constitutional: Negative for fever, chills and malaise/fatigue.   HENT: Negative for congestion.   "  Eyes: Negative for pain.   Respiratory: Negative for cough and shortness of breath.    Cardiovascular: Negative for leg swelling.   Gastrointestinal: Negative for nausea, vomiting, abdominal pain and diarrhea.   Genitourinary: Negative for dysuria and hematuria.   Skin: Negative for rash.   Neurological: Negative for dizziness, focal weakness and headaches.   Endo/Heme/Allergies: Does not bruise/bleed easily.   Psychiatric/Behavioral: Negative for depression.  The patient is not nervous/anxious.      Objective:     /60 (BP Location: Left arm, Patient Position: Sitting, BP Cuff Size: Adult)   Pulse 87   Temp 37 °C (98.6 °F) (Temporal)   Ht 1.676 m (5' 6\")   Wt (!) 127 kg (279 lb 3.2 oz)   SpO2 93%   BMI 45.06 kg/m²   Body mass index is 45.06 kg/m².  Wt Readings from Last 4 Encounters:   08/08/23 (!) 127 kg (279 lb 3.2 oz)   05/29/23 125 kg (275 lb)   02/06/23 125 kg (275 lb)   11/18/22 (!) 126 kg (278 lb)       Physical Exam:  Constitutional: Well-developed and well-nourished. Not diaphoretic. No distress.   Skin: Skin is warm and dry. No rash noted.  Head: Atraumatic without lesions.  Eyes: Conjunctivae and extraocular motions are normal. Pupils are equal, round, and reactive to light. No scleral icterus.   Ears:  External ears unremarkable. Tympanic membranes clear and intact.  Nose: Nares patent. Septum midline. Turbinates without erythema nor edema. No discharge.   Mouth/Throat: Dentition is normal. Tongue normal. Oropharynx is clear and moist. Posterior pharynx without erythema or exudates.  Neck: Supple, trachea midline. Normal range of motion. No thyromegaly present. No lymphadenopathy--cervical or supraclavicular.  Cardiovascular: Regular rate and rhythm, S1 and S2 without murmur, rubs, or gallops.    Lungs: Effort normal. Clear to auscultation throughout. No adventitious sounds. No CVA tenderness.  Abdomen: Soft, non tender, and without distention. Active bowel sounds in all four quadrants. No " rebound, guarding, masses or HSM.  Extremities: No cyanosis, clubbing, erythema, nor edema. Distal pulses intact and symmetric.   Musculoskeletal: All major joints AROM full in all directions without pain.  Neurological: Alert and oriented x 3.   Psychiatric:  Behavior, mood, and affect are appropriate.    Assessment and Plan:     1. Well adult exam            HCM: Completed.    Counseling about diet, supplements, exercise, skin care and safe sex.    Follow-up: No follow-ups on file.

## 2024-02-21 ENCOUNTER — APPOINTMENT (OUTPATIENT)
Dept: MEDICAL GROUP | Facility: PHYSICIAN GROUP | Age: 59
End: 2024-02-21

## 2024-04-24 ENCOUNTER — APPOINTMENT (OUTPATIENT)
Dept: MEDICAL GROUP | Facility: PHYSICIAN GROUP | Age: 59
End: 2024-04-24

## 2024-05-09 ENCOUNTER — OFFICE VISIT (OUTPATIENT)
Dept: URGENT CARE | Facility: CLINIC | Age: 59
End: 2024-05-09
Payer: COMMERCIAL

## 2024-05-09 VITALS
TEMPERATURE: 97.3 F | DIASTOLIC BLOOD PRESSURE: 78 MMHG | BODY MASS INDEX: 45.64 KG/M2 | RESPIRATION RATE: 18 BRPM | WEIGHT: 284 LBS | HEART RATE: 93 BPM | SYSTOLIC BLOOD PRESSURE: 148 MMHG | OXYGEN SATURATION: 94 % | HEIGHT: 66 IN

## 2024-05-09 DIAGNOSIS — R05.1 ACUTE COUGH: ICD-10-CM

## 2024-05-09 DIAGNOSIS — I10 ESSENTIAL HYPERTENSION: ICD-10-CM

## 2024-05-09 DIAGNOSIS — J45.901 REACTIVE AIRWAY DISEASE WITH ACUTE EXACERBATION, UNSPECIFIED ASTHMA SEVERITY, UNSPECIFIED WHETHER PERSISTENT: ICD-10-CM

## 2024-05-09 PROCEDURE — 99214 OFFICE O/P EST MOD 30 MIN: CPT | Performed by: REGISTERED NURSE

## 2024-05-09 PROCEDURE — 3077F SYST BP >= 140 MM HG: CPT | Performed by: REGISTERED NURSE

## 2024-05-09 PROCEDURE — 3078F DIAST BP <80 MM HG: CPT | Performed by: REGISTERED NURSE

## 2024-05-09 RX ORDER — PREDNISONE 10 MG/1
40 TABLET ORAL DAILY
Qty: 20 TABLET | Refills: 0 | Status: SHIPPED | OUTPATIENT
Start: 2024-05-09 | End: 2024-05-14

## 2024-05-09 RX ORDER — BENZONATATE 100 MG/1
100 CAPSULE ORAL 3 TIMES DAILY PRN
Qty: 30 CAPSULE | Refills: 0 | Status: SHIPPED | OUTPATIENT
Start: 2024-05-09 | End: 2024-05-19

## 2024-05-09 RX ORDER — ALBUTEROL SULFATE 90 UG/1
2 AEROSOL, METERED RESPIRATORY (INHALATION) EVERY 6 HOURS PRN
Qty: 8.5 G | Refills: 0 | Status: SHIPPED | OUTPATIENT
Start: 2024-05-09

## 2024-05-09 ASSESSMENT — ENCOUNTER SYMPTOMS
SHORTNESS OF BREATH: 0
CHILLS: 0
DIZZINESS: 0
FEVER: 0
WHEEZING: 1

## 2024-05-09 NOTE — PROGRESS NOTES
"Subjective:   Mingo Christiansen is a 58 y.o. male who presents for Congestion (Wheezing x 4 days)      HPI  4 days ago had a cold and now it feels as if it has moved into the lungs with some wheezing, has had some episodes of reactive airway since he was younger.  Has used albuterol in the past, his current inhaler is  which he is using more frequently. He was exposed to people at work who are sick. Hx of HTN, using carvedilol and is compliant, does endorse some weight gain. Tolerating PO.    Review of Systems   Constitutional:  Negative for chills and fever.   HENT:  Positive for congestion.    Respiratory:  Positive for wheezing. Negative for shortness of breath.    Cardiovascular:  Negative for chest pain.   Skin:  Negative for rash.   Neurological:  Negative for dizziness.       Allergies   Allergen Reactions    Erythromycin Unspecified     Pt states \"Years ago he had a high temp\".       Patient Active Problem List    Diagnosis Date Noted    Chronic bilateral low back pain with bilateral sciatica 2023    Acute pain of left shoulder 2022    Venous insufficiency 2022    Nasal lesion 2022    Chronic gout of multiple sites 2021    Generalized joint pain 2020    Essential hypertension 2018       Current Outpatient Medications Ordered in Epic   Medication Sig Dispense Refill    predniSONE (DELTASONE) 10 MG Tab Take 4 Tablets by mouth every day for 5 days. 20 Tablet 0    albuterol 108 (90 Base) MCG/ACT Aero Soln inhalation aerosol Inhale 2 Puffs every 6 hours as needed for Shortness of Breath. 8.5 g 0    benzonatate (TESSALON) 100 MG Cap Take 1 Capsule by mouth 3 times a day as needed for Cough for up to 10 days. 30 Capsule 0    carvedilol (COREG) 12.5 MG Tab Take 1 Tablet by mouth 2 times a day with meals. 180 Tablet 3    allopurinol (ZYLOPRIM) 300 MG Tab Take 1 tablet by mouth once daily 90 Tablet 3    Multiple Vitamins-Minerals (DAILY MULTI PO) Take  by mouth.   " "    No current [facility]-ordered facility-administered medications on file.       Past Surgical History:   Procedure Laterality Date    OTHER ORTHOPEDIC SURGERY  1995    L Knee       Social History     Tobacco Use    Smoking status: Never    Smokeless tobacco: Never   Vaping Use    Vaping Use: Never used   Substance Use Topics    Alcohol use: Yes     Alcohol/week: 3.6 oz     Types: 6 Cans of beer per week     Comment: weekends    Drug use: Never       family history includes Arthritis in his father.     Problem list, medications, and allergies reviewed by myself today in Epic.     Objective:   BP (!) 148/78 (BP Location: Left arm, Patient Position: Sitting, BP Cuff Size: Adult long)   Pulse 93   Temp 36.3 °C (97.3 °F) (Temporal)   Resp 18   Ht 1.676 m (5' 6\")   Wt (!) 129 kg (284 lb)   SpO2 94%   BMI 45.84 kg/m²     Physical Exam  Vitals and nursing note reviewed.   Constitutional:       General: He is not in acute distress.     Appearance: Normal appearance. He is well-developed. He is not ill-appearing, toxic-appearing or diaphoretic.   HENT:      Head: Normocephalic and atraumatic.      Right Ear: Tympanic membrane, ear canal and external ear normal.      Left Ear: Tympanic membrane, ear canal and external ear normal.      Nose: Congestion and rhinorrhea present.      Mouth/Throat:      Mouth: Mucous membranes are moist.      Pharynx: Oropharynx is clear. No oropharyngeal exudate or posterior oropharyngeal erythema.   Eyes:      General:         Right eye: No discharge.         Left eye: No discharge.      Conjunctiva/sclera: Conjunctivae normal.   Cardiovascular:      Rate and Rhythm: Normal rate and regular rhythm.      Pulses: Normal pulses.   Pulmonary:      Effort: Pulmonary effort is normal. No respiratory distress.      Breath sounds: Wheezing (scattered expiratory) present. No rhonchi or rales.   Chest:      Chest wall: No tenderness.   Musculoskeletal:         General: No swelling or tenderness.      " Cervical back: Normal range of motion and neck supple.      Right lower leg: No edema.      Left lower leg: No edema.   Lymphadenopathy:      Cervical: No cervical adenopathy.   Skin:     General: Skin is warm and dry.   Neurological:      General: No focal deficit present.      Mental Status: He is alert and oriented to person, place, and time. Mental status is at baseline.      Cranial Nerves: No cranial nerve deficit.   Psychiatric:         Mood and Affect: Mood normal.         Behavior: Behavior normal.         Thought Content: Thought content normal.         Judgment: Judgment normal.         Assessment/Plan:     I personally reviewed prior external notes and test results pertinent to today's visit as well as additional imaging and testing completed in clinic today. Shared decision-making was utilized with patient for treatment plan.     1. Reactive airway disease with acute exacerbation, unspecified asthma severity, unspecified whether persistent  predniSONE (DELTASONE) 10 MG Tab    albuterol 108 (90 Base) MCG/ACT Aero Soln inhalation aerosol      2. Essential hypertension        3. Acute cough  benzonatate (TESSALON) 100 MG Cap        TESTING: deferred  VITAL SIGNS: Afebrile, no tachycardia, good oxygenation he is hypertensive at 148/78  MDM/PLAN: Very pleasant 58-year-old who developed cold-like symptoms at the beginning of the week, does have underlying history of reactive airway disease which currently seems to be in exacerbation.  Has been using his albuterol inhaler more frequently.  He is not exhibiting signs of distress.  Talking full sentences.  I did appreciate some scattered expiratory wheezes.  Remainder of the exam was unremarkable.  We did have a long discussion about his elevated blood pressure at 148/78, he does report compliance with his carvedilol but he is currently not at goal with his hypertension.    Prednisone for reactive airway exacerbation  Albuterol for reactive airway  exacerbation  Benzonatate  OTC cold and cough medicine  Pulmonary toilet  Recommend adequate hydration   Discussed blood pressure log and monitoring symptoms at home following up with primary care in 1 month for reevaluation and medication management  Rest  Return precautions discussed        Medication discussed included indication for use and the potential benefits and side effects. Education was provided regarding the aforementioned assessments. All of the patient's questions were answered to their satisfaction at the time of discharge. Patient was encouraged to monitor symptoms closely and we reviewed the signs and symptoms which would warrant concern and mandate seeking a higher level of service through the emergency department. Patient stated agreement and understanding of this plan of care.     Please note that this dictation was created using voice recognition software. I have made every reasonable attempt to correct obvious errors, but I expect that there are errors of grammar and possibly content that I did not discover before finalizing the note.    This note was electronically signed by KAYKAY Mcmahon

## 2024-05-09 NOTE — LETTER
May 9, 2024         Patient: Mingo Christiansen   YOB: 1965   Date of Visit: 5/9/2024           To Whom it May Concern:    Mingo Christiansen was seen in my clinic on 5/9/2024. He may return to work on 05/13/24.    If you have any questions or concerns, please don't hesitate to call.        Sincerely,           KAYKAY Mcmahon  Electronically Signed

## 2024-07-22 ENCOUNTER — OFFICE VISIT (OUTPATIENT)
Dept: URGENT CARE | Facility: PHYSICIAN GROUP | Age: 59
End: 2024-07-22
Payer: COMMERCIAL

## 2024-07-22 VITALS
HEIGHT: 66 IN | OXYGEN SATURATION: 91 % | DIASTOLIC BLOOD PRESSURE: 82 MMHG | TEMPERATURE: 98.3 F | RESPIRATION RATE: 14 BRPM | SYSTOLIC BLOOD PRESSURE: 126 MMHG | HEART RATE: 82 BPM | BODY MASS INDEX: 44.2 KG/M2 | WEIGHT: 275 LBS

## 2024-07-22 DIAGNOSIS — I10 ESSENTIAL HYPERTENSION: ICD-10-CM

## 2024-07-22 DIAGNOSIS — J45.20 MILD INTERMITTENT ASTHMA WITHOUT COMPLICATION: ICD-10-CM

## 2024-07-22 DIAGNOSIS — U07.1 COVID-19: ICD-10-CM

## 2024-07-22 LAB
FLUAV RNA SPEC QL NAA+PROBE: NEGATIVE
FLUBV RNA SPEC QL NAA+PROBE: NEGATIVE
RSV RNA SPEC QL NAA+PROBE: NEGATIVE
SARS-COV-2 RNA RESP QL NAA+PROBE: POSITIVE

## 2024-07-22 PROCEDURE — 0241U POCT CEPHEID COV-2, FLU A/B, RSV - PCR: CPT | Performed by: FAMILY MEDICINE

## 2024-07-22 PROCEDURE — 3074F SYST BP LT 130 MM HG: CPT | Performed by: FAMILY MEDICINE

## 2024-07-22 PROCEDURE — 3079F DIAST BP 80-89 MM HG: CPT | Performed by: FAMILY MEDICINE

## 2024-07-22 PROCEDURE — 99214 OFFICE O/P EST MOD 30 MIN: CPT | Performed by: FAMILY MEDICINE

## 2024-07-22 RX ORDER — PREDNISONE 20 MG/1
40 TABLET ORAL DAILY
Qty: 10 TABLET | Refills: 0 | Status: SHIPPED | OUTPATIENT
Start: 2024-07-22 | End: 2024-07-27

## 2024-07-22 ASSESSMENT — ENCOUNTER SYMPTOMS
MYALGIAS: 1
SORE THROAT: 1

## 2024-08-11 SDOH — ECONOMIC STABILITY: HOUSING INSECURITY
IN THE LAST 12 MONTHS, WAS THERE A TIME WHEN YOU DID NOT HAVE A STEADY PLACE TO SLEEP OR SLEPT IN A SHELTER (INCLUDING NOW)?: NO

## 2024-08-11 SDOH — ECONOMIC STABILITY: TRANSPORTATION INSECURITY
IN THE PAST 12 MONTHS, HAS LACK OF TRANSPORTATION KEPT YOU FROM MEETINGS, WORK, OR FROM GETTING THINGS NEEDED FOR DAILY LIVING?: NO

## 2024-08-11 SDOH — ECONOMIC STABILITY: INCOME INSECURITY: IN THE LAST 12 MONTHS, WAS THERE A TIME WHEN YOU WERE NOT ABLE TO PAY THE MORTGAGE OR RENT ON TIME?: NO

## 2024-08-11 SDOH — ECONOMIC STABILITY: FOOD INSECURITY: WITHIN THE PAST 12 MONTHS, THE FOOD YOU BOUGHT JUST DIDN'T LAST AND YOU DIDN'T HAVE MONEY TO GET MORE.: NEVER TRUE

## 2024-08-11 SDOH — HEALTH STABILITY: PHYSICAL HEALTH: ON AVERAGE, HOW MANY MINUTES DO YOU ENGAGE IN EXERCISE AT THIS LEVEL?: 30 MIN

## 2024-08-11 SDOH — ECONOMIC STABILITY: TRANSPORTATION INSECURITY
IN THE PAST 12 MONTHS, HAS THE LACK OF TRANSPORTATION KEPT YOU FROM MEDICAL APPOINTMENTS OR FROM GETTING MEDICATIONS?: NO

## 2024-08-11 SDOH — ECONOMIC STABILITY: TRANSPORTATION INSECURITY
IN THE PAST 12 MONTHS, HAS LACK OF RELIABLE TRANSPORTATION KEPT YOU FROM MEDICAL APPOINTMENTS, MEETINGS, WORK OR FROM GETTING THINGS NEEDED FOR DAILY LIVING?: NO

## 2024-08-11 SDOH — ECONOMIC STABILITY: FOOD INSECURITY: WITHIN THE PAST 12 MONTHS, YOU WORRIED THAT YOUR FOOD WOULD RUN OUT BEFORE YOU GOT MONEY TO BUY MORE.: NEVER TRUE

## 2024-08-11 SDOH — HEALTH STABILITY: PHYSICAL HEALTH: ON AVERAGE, HOW MANY DAYS PER WEEK DO YOU ENGAGE IN MODERATE TO STRENUOUS EXERCISE (LIKE A BRISK WALK)?: 2 DAYS

## 2024-08-11 SDOH — HEALTH STABILITY: MENTAL HEALTH
STRESS IS WHEN SOMEONE FEELS TENSE, NERVOUS, ANXIOUS, OR CAN'T SLEEP AT NIGHT BECAUSE THEIR MIND IS TROUBLED. HOW STRESSED ARE YOU?: NOT AT ALL

## 2024-08-11 SDOH — ECONOMIC STABILITY: INCOME INSECURITY: HOW HARD IS IT FOR YOU TO PAY FOR THE VERY BASICS LIKE FOOD, HOUSING, MEDICAL CARE, AND HEATING?: NOT HARD AT ALL

## 2024-08-11 SDOH — ECONOMIC STABILITY: HOUSING INSECURITY: IN THE LAST 12 MONTHS, HOW MANY PLACES HAVE YOU LIVED?: 1

## 2024-08-11 ASSESSMENT — SOCIAL DETERMINANTS OF HEALTH (SDOH)
IN A TYPICAL WEEK, HOW MANY TIMES DO YOU TALK ON THE PHONE WITH FAMILY, FRIENDS, OR NEIGHBORS?: NEVER
IN A TYPICAL WEEK, HOW MANY TIMES DO YOU TALK ON THE PHONE WITH FAMILY, FRIENDS, OR NEIGHBORS?: NEVER
HOW OFTEN DO YOU ATTEND CHURCH OR RELIGIOUS SERVICES?: NEVER
IN THE PAST 12 MONTHS, HAS THE ELECTRIC, GAS, OIL, OR WATER COMPANY THREATENED TO SHUT OFF SERVICE IN YOUR HOME?: NO
HOW OFTEN DO YOU ATTENT MEETINGS OF THE CLUB OR ORGANIZATION YOU BELONG TO?: NEVER
HOW OFTEN DO YOU ATTENT MEETINGS OF THE CLUB OR ORGANIZATION YOU BELONG TO?: NEVER
HOW HARD IS IT FOR YOU TO PAY FOR THE VERY BASICS LIKE FOOD, HOUSING, MEDICAL CARE, AND HEATING?: NOT HARD AT ALL
WITHIN THE PAST 12 MONTHS, YOU WORRIED THAT YOUR FOOD WOULD RUN OUT BEFORE YOU GOT THE MONEY TO BUY MORE: NEVER TRUE
DO YOU BELONG TO ANY CLUBS OR ORGANIZATIONS SUCH AS CHURCH GROUPS UNIONS, FRATERNAL OR ATHLETIC GROUPS, OR SCHOOL GROUPS?: NO
HOW OFTEN DO YOU GET TOGETHER WITH FRIENDS OR RELATIVES?: NEVER
DO YOU BELONG TO ANY CLUBS OR ORGANIZATIONS SUCH AS CHURCH GROUPS UNIONS, FRATERNAL OR ATHLETIC GROUPS, OR SCHOOL GROUPS?: NO
ARE YOU MARRIED, WIDOWED, DIVORCED, SEPARATED, NEVER MARRIED, OR LIVING WITH A PARTNER?: NEVER MARRIED
HOW OFTEN DO YOU HAVE A DRINK CONTAINING ALCOHOL: 2-4 TIMES A MONTH
ARE YOU MARRIED, WIDOWED, DIVORCED, SEPARATED, NEVER MARRIED, OR LIVING WITH A PARTNER?: NEVER MARRIED
HOW OFTEN DO YOU ATTEND CHURCH OR RELIGIOUS SERVICES?: NEVER
HOW OFTEN DO YOU HAVE SIX OR MORE DRINKS ON ONE OCCASION: MONTHLY
HOW OFTEN DO YOU GET TOGETHER WITH FRIENDS OR RELATIVES?: NEVER
HOW MANY DRINKS CONTAINING ALCOHOL DO YOU HAVE ON A TYPICAL DAY WHEN YOU ARE DRINKING: 1 OR 2

## 2024-08-11 ASSESSMENT — LIFESTYLE VARIABLES
HOW OFTEN DO YOU HAVE A DRINK CONTAINING ALCOHOL: 2-4 TIMES A MONTH
AUDIT-C TOTAL SCORE: 4
HOW MANY STANDARD DRINKS CONTAINING ALCOHOL DO YOU HAVE ON A TYPICAL DAY: 1 OR 2
SKIP TO QUESTIONS 9-10: 0
HOW OFTEN DO YOU HAVE SIX OR MORE DRINKS ON ONE OCCASION: MONTHLY

## 2024-08-14 ENCOUNTER — OFFICE VISIT (OUTPATIENT)
Dept: MEDICAL GROUP | Facility: MEDICAL CENTER | Age: 59
End: 2024-08-14
Payer: COMMERCIAL

## 2024-08-14 VITALS
SYSTOLIC BLOOD PRESSURE: 138 MMHG | DIASTOLIC BLOOD PRESSURE: 78 MMHG | HEART RATE: 102 BPM | WEIGHT: 277.67 LBS | TEMPERATURE: 98.5 F | HEIGHT: 66 IN | BODY MASS INDEX: 44.63 KG/M2 | RESPIRATION RATE: 18 BRPM | OXYGEN SATURATION: 95 %

## 2024-08-14 DIAGNOSIS — M25.50 GENERALIZED JOINT PAIN: ICD-10-CM

## 2024-08-14 DIAGNOSIS — R73.03 PREDIABETES: ICD-10-CM

## 2024-08-14 DIAGNOSIS — Z76.89 ENCOUNTER TO ESTABLISH CARE: ICD-10-CM

## 2024-08-14 DIAGNOSIS — I10 ESSENTIAL HYPERTENSION: ICD-10-CM

## 2024-08-14 DIAGNOSIS — J45.901 REACTIVE AIRWAY DISEASE WITH ACUTE EXACERBATION, UNSPECIFIED ASTHMA SEVERITY, UNSPECIFIED WHETHER PERSISTENT: ICD-10-CM

## 2024-08-14 DIAGNOSIS — M1A.9XX0 CHRONIC GOUT INVOLVING TOE OF RIGHT FOOT WITHOUT TOPHUS, UNSPECIFIED CAUSE: ICD-10-CM

## 2024-08-14 DIAGNOSIS — Z00.00 ENCOUNTER FOR PREVENTIVE CARE: ICD-10-CM

## 2024-08-14 PROBLEM — J34.89 NASAL LESION: Status: RESOLVED | Noted: 2022-08-09 | Resolved: 2024-08-14

## 2024-08-14 PROBLEM — M25.512 ACUTE PAIN OF LEFT SHOULDER: Status: RESOLVED | Noted: 2022-09-21 | Resolved: 2024-08-14

## 2024-08-14 PROCEDURE — 90471 IMMUNIZATION ADMIN: CPT | Performed by: STUDENT IN AN ORGANIZED HEALTH CARE EDUCATION/TRAINING PROGRAM

## 2024-08-14 PROCEDURE — 3078F DIAST BP <80 MM HG: CPT | Performed by: STUDENT IN AN ORGANIZED HEALTH CARE EDUCATION/TRAINING PROGRAM

## 2024-08-14 PROCEDURE — 3075F SYST BP GE 130 - 139MM HG: CPT | Performed by: STUDENT IN AN ORGANIZED HEALTH CARE EDUCATION/TRAINING PROGRAM

## 2024-08-14 PROCEDURE — 1126F AMNT PAIN NOTED NONE PRSNT: CPT | Performed by: STUDENT IN AN ORGANIZED HEALTH CARE EDUCATION/TRAINING PROGRAM

## 2024-08-14 PROCEDURE — 90715 TDAP VACCINE 7 YRS/> IM: CPT | Performed by: STUDENT IN AN ORGANIZED HEALTH CARE EDUCATION/TRAINING PROGRAM

## 2024-08-14 PROCEDURE — 99214 OFFICE O/P EST MOD 30 MIN: CPT | Mod: 25 | Performed by: STUDENT IN AN ORGANIZED HEALTH CARE EDUCATION/TRAINING PROGRAM

## 2024-08-14 RX ORDER — ALLOPURINOL 300 MG/1
TABLET ORAL
Qty: 90 TABLET | Refills: 3 | Status: SHIPPED | OUTPATIENT
Start: 2024-08-14

## 2024-08-14 RX ORDER — CARVEDILOL 12.5 MG/1
12.5 TABLET ORAL 2 TIMES DAILY WITH MEALS
Qty: 180 TABLET | Refills: 3 | Status: SHIPPED | OUTPATIENT
Start: 2024-08-14

## 2024-08-14 ASSESSMENT — ENCOUNTER SYMPTOMS
ABDOMINAL PAIN: 0
DIARRHEA: 0
PALPITATIONS: 0
FEVER: 0
VOMITING: 0
CHILLS: 0

## 2024-08-14 ASSESSMENT — PAIN SCALES - GENERAL: PAINLEVEL: NO PAIN

## 2024-08-14 ASSESSMENT — PATIENT HEALTH QUESTIONNAIRE - PHQ9: CLINICAL INTERPRETATION OF PHQ2 SCORE: 0

## 2024-08-14 NOTE — PROGRESS NOTES
Mingo Christiansen is a 59 y.o. old male  who is here to \[facility]\"" care and medication refill    Chief Complaint   Patient presents with    Establish Care     Medication refills   Requesting lab orders         Problem   Prediabetes    Lab Results   Component Value Date/Time    HBA1C 5.7 (H) 08/03/2023 07:12 AM           Essential Hypertension    Patient is on Coreg 12.52 times daily.  He reports he had tachycardia previously and that is the reason he was started on Coreg.  He checks blood pressure at home and usually ranges less than 140.     Acute Pain of Left Shoulder (Resolved)   Nasal Lesion (Resolved)         Review of Systems   Constitutional:  Negative for chills and fever.   Cardiovascular:  Negative for chest pain and palpitations.   Gastrointestinal:  Negative for abdominal pain, diarrhea and vomiting.        Patient  has a past medical history of Asthma, Gout, and Hypertension.  Patient  has a past surgical history that includes other orthopedic surgery (01/01/1995) and blepharoplasty.  History reviewed. No pertinent family history.    Social History     Tobacco Use    Smoking status: Never    Smokeless tobacco: Never   Vaping Use    Vaping status: Never Used   Substance Use Topics    Alcohol use: Not Currently     Alcohol/week: 3.6 oz     Types: 6 Standard drinks or equivalent per week     Comment: weekends    Drug use: Never     Patient Active Problem List    Diagnosis Date Noted    Prediabetes 08/14/2024    Chronic bilateral low back pain with bilateral sciatica 02/06/2023    Venous insufficiency 09/21/2022    Chronic gout of multiple sites 07/01/2021    Generalized joint pain 01/13/2020    Essential hypertension 01/04/2018     Current Outpatient Medications   Medication Sig Dispense Refill    carvedilol (COREG) 12.5 MG Tab Take 1 Tablet by mouth 2 times a day with meals. 180 Tablet 3    allopurinol (ZYLOPRIM) 300 MG Tab Take 1 tablet by mouth once daily 90 Tablet 3    albuterol 108 (90 Base)  "MCG/ACT Aero Soln inhalation aerosol Inhale 2 Puffs every 6 hours as needed for Shortness of Breath. 8.5 g 0    Multiple Vitamins-Minerals (DAILY MULTI PO) Take  by mouth.       No current facility-administered medications for this visit.    (including changes today)  Allergies: Erythromycin    /78   Pulse (!) 102   Temp 36.9 °C (98.5 °F) (Temporal)   Resp 18   Ht 1.676 m (5' 6\")   Wt (!) 126 kg (277 lb 10.7 oz)   SpO2 95%      Physical Exam  Constitutional:       Appearance: Normal appearance. He is obese.   Cardiovascular:      Rate and Rhythm: Normal rate and regular rhythm.      Pulses: Normal pulses.      Heart sounds: Normal heart sounds. No murmur heard.  Pulmonary:      Effort: Pulmonary effort is normal. No respiratory distress.      Breath sounds: No wheezing.   Abdominal:      Palpations: Abdomen is soft.      Tenderness: There is no abdominal tenderness.   Skin:     General: Skin is warm.      Coloration: Skin is not jaundiced or pale.   Neurological:      General: No focal deficit present.      Mental Status: He is alert and oriented to person, place, and time.   Psychiatric:         Mood and Affect: Mood normal.          Health maintenance:   Tdap today    Recent lab results reviewed    Assessment and plan:    Problem List Items Addressed This Visit          Family Medicine Problems    Essential hypertension     - Continue Coreg 12.5 twice daily.         Relevant Medications    carvedilol (COREG) 12.5 MG Tab       Other    Generalized joint pain    Relevant Medications    allopurinol (ZYLOPRIM) 300 MG Tab    Prediabetes     - Counseled on diet and lifestyle modification          Other Visit Diagnoses       Reactive airway disease with acute exacerbation, unspecified asthma severity, unspecified whether persistent        Encounter for preventive care        Relevant Orders    CBC WITH DIFFERENTIAL    Lipid Profile    Comp Metabolic Panel    TSH    PROSTATE SPECIFIC AG SCREENING    VITAMIN " D,25 HYDROXY (DEFICIENCY)    HEMOGLOBIN A1C    HIV AG/AB COMBO ASSAY SCREENING    Tdap Vaccine =>8YO IM (Completed)    Encounter to establish care        Chronic gout involving toe of right foot without tophus, unspecified cause        Relevant Medications    allopurinol (ZYLOPRIM) 300 MG Tab    Other Relevant Orders    URIC ACID, SERUM                Return in about 3 months (around 11/14/2024) for Annualwellness.          Please note that this dictation was created using voice recognition software. I have made every reasonable attempt to correct obvious errors, but I expect that there are errors of grammar and possibly content that I did not discover before finalizing the note.

## 2024-08-28 ENCOUNTER — HOSPITAL ENCOUNTER (OUTPATIENT)
Dept: LAB | Facility: MEDICAL CENTER | Age: 59
End: 2024-08-28
Attending: STUDENT IN AN ORGANIZED HEALTH CARE EDUCATION/TRAINING PROGRAM
Payer: COMMERCIAL

## 2024-08-28 DIAGNOSIS — Z00.00 ENCOUNTER FOR PREVENTIVE CARE: ICD-10-CM

## 2024-08-28 LAB
25(OH)D3 SERPL-MCNC: 30 NG/ML (ref 30–100)
BASOPHILS # BLD AUTO: 0.6 % (ref 0–1.8)
BASOPHILS # BLD: 0.04 K/UL (ref 0–0.12)
EOSINOPHIL # BLD AUTO: 0.25 K/UL (ref 0–0.51)
EOSINOPHIL NFR BLD: 4.1 % (ref 0–6.9)
ERYTHROCYTE [DISTWIDTH] IN BLOOD BY AUTOMATED COUNT: 52.2 FL (ref 35.9–50)
EST. AVERAGE GLUCOSE BLD GHB EST-MCNC: 114 MG/DL
HBA1C MFR BLD: 5.6 % (ref 4–5.6)
HCT VFR BLD AUTO: 46.2 % (ref 42–52)
HGB BLD-MCNC: 15.5 G/DL (ref 14–18)
HIV 1+2 AB+HIV1 P24 AG SERPL QL IA: NORMAL
IMM GRANULOCYTES # BLD AUTO: 0.01 K/UL (ref 0–0.11)
IMM GRANULOCYTES NFR BLD AUTO: 0.2 % (ref 0–0.9)
LYMPHOCYTES # BLD AUTO: 1.89 K/UL (ref 1–4.8)
LYMPHOCYTES NFR BLD: 30.7 % (ref 22–41)
MCH RBC QN AUTO: 33.8 PG (ref 27–33)
MCHC RBC AUTO-ENTMCNC: 33.5 G/DL (ref 32.3–36.5)
MCV RBC AUTO: 100.9 FL (ref 81.4–97.8)
MONOCYTES # BLD AUTO: 0.62 K/UL (ref 0–0.85)
MONOCYTES NFR BLD AUTO: 10.1 % (ref 0–13.4)
NEUTROPHILS # BLD AUTO: 3.35 K/UL (ref 1.82–7.42)
NEUTROPHILS NFR BLD: 54.3 % (ref 44–72)
NRBC # BLD AUTO: 0 K/UL
NRBC BLD-RTO: 0 /100 WBC (ref 0–0.2)
PLATELET # BLD AUTO: 225 K/UL (ref 164–446)
PMV BLD AUTO: 10 FL (ref 9–12.9)
PSA SERPL-MCNC: 0.5 NG/ML (ref 0–4)
RBC # BLD AUTO: 4.58 M/UL (ref 4.7–6.1)
TSH SERPL-ACNC: 2.4 UIU/ML (ref 0.35–5.5)
WBC # BLD AUTO: 6.2 K/UL (ref 4.8–10.8)

## 2024-08-28 PROCEDURE — 83036 HEMOGLOBIN GLYCOSYLATED A1C: CPT

## 2024-08-28 PROCEDURE — 80053 COMPREHEN METABOLIC PANEL: CPT

## 2024-08-28 PROCEDURE — 84443 ASSAY THYROID STIM HORMONE: CPT

## 2024-08-28 PROCEDURE — 36415 COLL VENOUS BLD VENIPUNCTURE: CPT

## 2024-08-28 PROCEDURE — 84550 ASSAY OF BLOOD/URIC ACID: CPT

## 2024-08-28 PROCEDURE — 82306 VITAMIN D 25 HYDROXY: CPT

## 2024-08-28 PROCEDURE — 87389 HIV-1 AG W/HIV-1&-2 AB AG IA: CPT

## 2024-08-28 PROCEDURE — 80061 LIPID PANEL: CPT

## 2024-08-28 PROCEDURE — 85025 COMPLETE CBC W/AUTO DIFF WBC: CPT

## 2024-08-28 PROCEDURE — 84153 ASSAY OF PSA TOTAL: CPT

## 2024-08-29 LAB
ALBUMIN SERPL BCP-MCNC: 4.3 G/DL (ref 3.2–4.9)
ALBUMIN/GLOB SERPL: 1.5 G/DL
ALP SERPL-CCNC: 69 U/L (ref 30–99)
ALT SERPL-CCNC: 31 U/L (ref 2–50)
ANION GAP SERPL CALC-SCNC: 12 MMOL/L (ref 7–16)
AST SERPL-CCNC: 30 U/L (ref 12–45)
BILIRUB SERPL-MCNC: 0.4 MG/DL (ref 0.1–1.5)
BUN SERPL-MCNC: 16 MG/DL (ref 8–22)
CALCIUM ALBUM COR SERPL-MCNC: 9.6 MG/DL (ref 8.5–10.5)
CALCIUM SERPL-MCNC: 9.8 MG/DL (ref 8.5–10.5)
CHLORIDE SERPL-SCNC: 104 MMOL/L (ref 96–112)
CHOLEST SERPL-MCNC: 171 MG/DL (ref 100–199)
CO2 SERPL-SCNC: 24 MMOL/L (ref 20–33)
CREAT SERPL-MCNC: 0.83 MG/DL (ref 0.5–1.4)
GFR SERPLBLD CREATININE-BSD FMLA CKD-EPI: 100 ML/MIN/1.73 M 2
GLOBULIN SER CALC-MCNC: 2.9 G/DL (ref 1.9–3.5)
GLUCOSE SERPL-MCNC: 95 MG/DL (ref 65–99)
HDLC SERPL-MCNC: 70 MG/DL
LDLC SERPL CALC-MCNC: 87 MG/DL
POTASSIUM SERPL-SCNC: 4.6 MMOL/L (ref 3.6–5.5)
PROT SERPL-MCNC: 7.2 G/DL (ref 6–8.2)
SODIUM SERPL-SCNC: 140 MMOL/L (ref 135–145)
TRIGL SERPL-MCNC: 68 MG/DL (ref 0–149)
URATE SERPL-MCNC: 5.4 MG/DL (ref 2.5–8.3)

## 2024-11-13 SDOH — ECONOMIC STABILITY: FOOD INSECURITY: WITHIN THE PAST 12 MONTHS, THE FOOD YOU BOUGHT JUST DIDN'T LAST AND YOU DIDN'T HAVE MONEY TO GET MORE.: NEVER TRUE

## 2024-11-13 SDOH — ECONOMIC STABILITY: INCOME INSECURITY: HOW HARD IS IT FOR YOU TO PAY FOR THE VERY BASICS LIKE FOOD, HOUSING, MEDICAL CARE, AND HEATING?: NOT HARD AT ALL

## 2024-11-13 SDOH — HEALTH STABILITY: PHYSICAL HEALTH: ON AVERAGE, HOW MANY MINUTES DO YOU ENGAGE IN EXERCISE AT THIS LEVEL?: 30 MIN

## 2024-11-13 SDOH — ECONOMIC STABILITY: INCOME INSECURITY: IN THE LAST 12 MONTHS, WAS THERE A TIME WHEN YOU WERE NOT ABLE TO PAY THE MORTGAGE OR RENT ON TIME?: NO

## 2024-11-13 SDOH — HEALTH STABILITY: PHYSICAL HEALTH: ON AVERAGE, HOW MANY DAYS PER WEEK DO YOU ENGAGE IN MODERATE TO STRENUOUS EXERCISE (LIKE A BRISK WALK)?: 3 DAYS

## 2024-11-13 SDOH — ECONOMIC STABILITY: FOOD INSECURITY: WITHIN THE PAST 12 MONTHS, YOU WORRIED THAT YOUR FOOD WOULD RUN OUT BEFORE YOU GOT MONEY TO BUY MORE.: NEVER TRUE

## 2024-11-13 ASSESSMENT — SOCIAL DETERMINANTS OF HEALTH (SDOH)
HOW MANY DRINKS CONTAINING ALCOHOL DO YOU HAVE ON A TYPICAL DAY WHEN YOU ARE DRINKING: 3 OR 4
IN THE PAST 12 MONTHS, HAS THE ELECTRIC, GAS, OIL, OR WATER COMPANY THREATENED TO SHUT OFF SERVICE IN YOUR HOME?: NO
HOW HARD IS IT FOR YOU TO PAY FOR THE VERY BASICS LIKE FOOD, HOUSING, MEDICAL CARE, AND HEATING?: NOT HARD AT ALL
HOW OFTEN DO YOU GET TOGETHER WITH FRIENDS OR RELATIVES?: NEVER
HOW OFTEN DO YOU HAVE A DRINK CONTAINING ALCOHOL: 2-4 TIMES A MONTH
HOW OFTEN DO YOU ATTENT MEETINGS OF THE CLUB OR ORGANIZATION YOU BELONG TO?: NEVER
IN A TYPICAL WEEK, HOW MANY TIMES DO YOU TALK ON THE PHONE WITH FAMILY, FRIENDS, OR NEIGHBORS?: TWICE A WEEK
IN A TYPICAL WEEK, HOW MANY TIMES DO YOU TALK ON THE PHONE WITH FAMILY, FRIENDS, OR NEIGHBORS?: TWICE A WEEK
WITHIN THE PAST 12 MONTHS, YOU WORRIED THAT YOUR FOOD WOULD RUN OUT BEFORE YOU GOT THE MONEY TO BUY MORE: NEVER TRUE
HOW OFTEN DO YOU HAVE SIX OR MORE DRINKS ON ONE OCCASION: LESS THAN MONTHLY
HOW OFTEN DO YOU ATTEND CHURCH OR RELIGIOUS SERVICES?: NEVER
DO YOU BELONG TO ANY CLUBS OR ORGANIZATIONS SUCH AS CHURCH GROUPS UNIONS, FRATERNAL OR ATHLETIC GROUPS, OR SCHOOL GROUPS?: NO
ARE YOU MARRIED, WIDOWED, DIVORCED, SEPARATED, NEVER MARRIED, OR LIVING WITH A PARTNER?: NEVER MARRIED
HOW OFTEN DO YOU ATTEND CHURCH OR RELIGIOUS SERVICES?: NEVER
HOW OFTEN DO YOU GET TOGETHER WITH FRIENDS OR RELATIVES?: NEVER
ARE YOU MARRIED, WIDOWED, DIVORCED, SEPARATED, NEVER MARRIED, OR LIVING WITH A PARTNER?: NEVER MARRIED
HOW OFTEN DO YOU ATTENT MEETINGS OF THE CLUB OR ORGANIZATION YOU BELONG TO?: NEVER
DO YOU BELONG TO ANY CLUBS OR ORGANIZATIONS SUCH AS CHURCH GROUPS UNIONS, FRATERNAL OR ATHLETIC GROUPS, OR SCHOOL GROUPS?: NO

## 2024-11-13 ASSESSMENT — LIFESTYLE VARIABLES
HOW MANY STANDARD DRINKS CONTAINING ALCOHOL DO YOU HAVE ON A TYPICAL DAY: 3 OR 4
AUDIT-C TOTAL SCORE: 4
SKIP TO QUESTIONS 9-10: 0
HOW OFTEN DO YOU HAVE A DRINK CONTAINING ALCOHOL: 2-4 TIMES A MONTH
HOW OFTEN DO YOU HAVE SIX OR MORE DRINKS ON ONE OCCASION: LESS THAN MONTHLY

## 2024-11-14 ENCOUNTER — OFFICE VISIT (OUTPATIENT)
Dept: MEDICAL GROUP | Facility: MEDICAL CENTER | Age: 59
End: 2024-11-14
Payer: COMMERCIAL

## 2024-11-14 VITALS
TEMPERATURE: 99 F | HEIGHT: 66 IN | WEIGHT: 265 LBS | BODY MASS INDEX: 42.59 KG/M2 | OXYGEN SATURATION: 96 % | DIASTOLIC BLOOD PRESSURE: 76 MMHG | SYSTOLIC BLOOD PRESSURE: 140 MMHG | HEART RATE: 88 BPM

## 2024-11-14 DIAGNOSIS — D75.89 MACROCYTOSIS: ICD-10-CM

## 2024-11-14 DIAGNOSIS — Z00.00 ENCOUNTER FOR PREVENTIVE CARE: ICD-10-CM

## 2024-11-14 DIAGNOSIS — I10 ESSENTIAL HYPERTENSION: ICD-10-CM

## 2024-11-14 PROCEDURE — 90656 IIV3 VACC NO PRSV 0.5 ML IM: CPT | Performed by: STUDENT IN AN ORGANIZED HEALTH CARE EDUCATION/TRAINING PROGRAM

## 2024-11-14 PROCEDURE — 3077F SYST BP >= 140 MM HG: CPT | Performed by: STUDENT IN AN ORGANIZED HEALTH CARE EDUCATION/TRAINING PROGRAM

## 2024-11-14 PROCEDURE — 90471 IMMUNIZATION ADMIN: CPT | Performed by: STUDENT IN AN ORGANIZED HEALTH CARE EDUCATION/TRAINING PROGRAM

## 2024-11-14 PROCEDURE — 3078F DIAST BP <80 MM HG: CPT | Performed by: STUDENT IN AN ORGANIZED HEALTH CARE EDUCATION/TRAINING PROGRAM

## 2024-11-14 PROCEDURE — 99396 PREV VISIT EST AGE 40-64: CPT | Mod: 25 | Performed by: STUDENT IN AN ORGANIZED HEALTH CARE EDUCATION/TRAINING PROGRAM

## 2024-11-14 RX ORDER — LISINOPRIL 5 MG/1
5 TABLET ORAL DAILY
Qty: 30 TABLET | Refills: 2 | Status: SHIPPED | OUTPATIENT
Start: 2024-11-14 | End: 2025-02-12

## 2024-11-14 ASSESSMENT — FIBROSIS 4 INDEX: FIB4 SCORE: 1.436842416214199206

## 2024-11-15 NOTE — PROGRESS NOTES
Subjective:     CC:   Chief Complaint   Patient presents with    Annual Exam       HPI:   Mingo Christiansen is a 59 y.o. male who presents for an annual exam. He is feeling well and has no complaints.    Health Maintenance  Cholesterol Screening:   Lab Results   Component Value Date/Time    CHOLSTRLTOT 171 08/28/2024 1500    TRIGLYCERIDE 68 08/28/2024 1500    HDL 70 08/28/2024 1500    LDL 87 08/28/2024 1500     Diabetes Screening:  Lab Results   Component Value Date/Time    HBA1C 5.6 08/28/2024 03:00 PM      Family History:  Updated: Adopted, does not know about his family history    Anticipatory Guidance  Diet:Moderately healthy, counseled adding more fruits and vegetables   Exercise: Counseled  Substance Abuse: alcohol 6 beers/pack once a week, counseled  Safe in relationship.   Seat belts, bike helmet, gun safety discussed.  Sun protection used.    Cancer screening  Colorectal Cancer Screening: next due 1/2026    Prostate Cancer Screening/PSA:    Latest Reference Range & Units 08/28/24 15:00   Prostatic Specific Antigen Tot 0.00 - 4.00 ng/mL 0.50       Infectious disease screening/Immunizations  --HIV Screening:    Latest Reference Range & Units 08/28/24 15:00   HIV Ag/Ab Combo Assay Non Reactive  Non-Reactive     --Hepatitis C Screening:   Latest Reference Range & Units 02/26/20 09:41   Hepatitis C Antibody Negative  Negative     --Immunizations:    Influenza: today   Tetanus: last tdap 8/2024   Shingles:Recommend to obtain from outside pharmacy    COVID-19: Recommend to obtain from outside pharmacy     He  has a past medical history of Asthma, Gout, and Hypertension.  He  has a past surgical history that includes other orthopedic surgery (01/01/1995) and blepharoplasty.  History reviewed. No pertinent family history.  Social History     Tobacco Use    Smoking status: Never    Smokeless tobacco: Never   Vaping Use    Vaping status: Never Used   Substance Use Topics    Alcohol use: Not Currently      "Alcohol/week: 3.6 oz     Types: 6 Standard drinks or equivalent per week     Comment: weekends    Drug use: Never       Patient Active Problem List    Diagnosis Date Noted    Prediabetes 08/14/2024    Chronic bilateral low back pain with bilateral sciatica 02/06/2023    Venous insufficiency 09/21/2022    Chronic gout of multiple sites 07/01/2021    Generalized joint pain 01/13/2020    Essential hypertension 01/04/2018       Current Outpatient Medications   Medication Sig Dispense Refill    lisinopril (PRINIVIL) 5 MG Tab Take 1 Tablet by mouth every day for 90 days. 30 Tablet 2    carvedilol (COREG) 12.5 MG Tab Take 1 Tablet by mouth 2 times a day with meals. 180 Tablet 3    allopurinol (ZYLOPRIM) 300 MG Tab Take 1 tablet by mouth once daily 90 Tablet 3    albuterol 108 (90 Base) MCG/ACT Aero Soln inhalation aerosol Inhale 2 Puffs every 6 hours as needed for Shortness of Breath. 8.5 g 0    Multiple Vitamins-Minerals (DAILY MULTI PO) Take  by mouth.       No current facility-administered medications for this visit.    (including changes today)  Allergies: Erythromycin    Review of Systems   Constitutional: Negative for fever, chills and malaise/fatigue.   HENT: Negative for congestion.    Eyes: Negative for pain.   Respiratory: Negative for cough and shortness of breath.    Cardiovascular: Negative for leg swelling.   Gastrointestinal: Negative for nausea, vomiting, abdominal pain and diarrhea.   Genitourinary: Negative for dysuria and hematuria.   Skin: Negative for rash.   Neurological: Negative for dizziness,  and headaches.   Endo/Heme/Allergies: Does not bleed easily.   Psychiatric/Behavioral:  The patient is not nervous/anxious.      Objective:     BP (!) 140/76 (BP Location: Left arm, Patient Position: Sitting, BP Cuff Size: Adult)   Pulse 88   Temp 37.2 °C (99 °F) (Temporal)   Ht 1.676 m (5' 6\")   Wt 120 kg (265 lb)   SpO2 96%   BMI 42.77 kg/m²   Body mass index is 42.77 kg/m².  Wt Readings from Last 4 " Encounters:   11/14/24 120 kg (265 lb)   08/14/24 (!) 126 kg (277 lb 10.7 oz)   07/22/24 125 kg (275 lb)   05/09/24 (!) 129 kg (284 lb)       Physical Exam:  Constitutional: Well-developed and well-nourished. Not diaphoretic. No distress.   Skin: Skin is warm and dry. No rash noted.  Head: Atraumatic without lesions.  Eyes: Conjunctivae and extraocular motions are normal. Pupils are equal, round, and reactive to light. No scleral icterus.   Ears:  External ears unremarkable. Tympanic membranes clear and intact.  Nose: Nares patent. Septum midline. Turbinates without erythema nor edema. No discharge.   Mouth/Throat: Dentition is fair. Tongue normal. Oropharynx is clear and moist. Posterior pharynx without erythema or exudates.  Neck: Supple, trachea midline. Normal range of motion. No thyromegaly present. No lymphadenopathy--cervical or supraclavicular.  Cardiovascular: Regular rate and rhythm, S1 and S2 without murmur  Lungs: Effort normal. Clear to auscultation throughout. No adventitious sounds.   Abdomen: Soft, non tender, and without distention. Active bowel sounds in all four quadrants. No rebound, guarding, masses or HSM.  Extremities: No cyanosis, clubbing, erythema, nor edema. Distal pulses intact and symmetric.   Musculoskeletal: All major joints AROM full in all directions without pain., foot drop left ankle-chronic  Neurological: Alert and oriented x 3. DTRs 2+/3 and symmetric. No cranial nerve deficit. 5/5 myotomes. Sensation intact.  Psychiatric:  Behavior, mood, and affect are appropriate.      Assessment and Plan:     Problem List Items Addressed This Visit          Family Medicine Problems    Essential hypertension     -chronic,unstable  -add lisinopril 5 mg daily         Relevant Medications    lisinopril (PRINIVIL) 5 MG Tab     Other Visit Diagnoses       Macrocytosis        Relevant Orders    VITAMIN B12    FOLATE    Encounter for preventive care        Relevant Orders    INFLUENZA VACCINE TRI INJ  (PF) (Completed)              Labs per orders.  Vaccinations per orders.  Counseling about diet, supplements, exercise, skin care   Referral for genetic research was offered. Patient declined    Follow-up: Return in about 4 months (around 3/14/2025) for Hypertension.

## 2024-11-21 ENCOUNTER — HOSPITAL ENCOUNTER (OUTPATIENT)
Dept: LAB | Facility: MEDICAL CENTER | Age: 59
End: 2024-11-21
Attending: STUDENT IN AN ORGANIZED HEALTH CARE EDUCATION/TRAINING PROGRAM
Payer: COMMERCIAL

## 2024-11-21 DIAGNOSIS — D75.89 MACROCYTOSIS: ICD-10-CM

## 2024-11-21 LAB
FOLATE SERPL-MCNC: 17.6 NG/ML
VIT B12 SERPL-MCNC: 781 PG/ML (ref 211–911)

## 2024-11-21 PROCEDURE — 36415 COLL VENOUS BLD VENIPUNCTURE: CPT

## 2024-11-21 PROCEDURE — 82746 ASSAY OF FOLIC ACID SERUM: CPT

## 2024-11-21 PROCEDURE — 82607 VITAMIN B-12: CPT

## 2025-06-23 ENCOUNTER — PATIENT MESSAGE (OUTPATIENT)
Dept: MEDICAL GROUP | Facility: MEDICAL CENTER | Age: 60
End: 2025-06-23

## 2025-06-23 RX ORDER — LISINOPRIL 5 MG/1
5 TABLET ORAL DAILY
COMMUNITY
End: 2025-06-23 | Stop reason: SDUPTHER

## 2025-06-23 RX ORDER — LISINOPRIL 5 MG/1
5 TABLET ORAL DAILY
Qty: 100 TABLET | Refills: 0 | Status: SHIPPED | OUTPATIENT
Start: 2025-06-23

## 2025-08-26 ENCOUNTER — OFFICE VISIT (OUTPATIENT)
Dept: MEDICAL GROUP | Facility: MEDICAL CENTER | Age: 60
End: 2025-08-26

## 2025-08-26 VITALS
WEIGHT: 281.64 LBS | BODY MASS INDEX: 45.26 KG/M2 | OXYGEN SATURATION: 94 % | SYSTOLIC BLOOD PRESSURE: 130 MMHG | DIASTOLIC BLOOD PRESSURE: 68 MMHG | TEMPERATURE: 98.4 F | HEART RATE: 86 BPM | HEIGHT: 66 IN

## 2025-08-26 DIAGNOSIS — M25.50 GENERALIZED JOINT PAIN: ICD-10-CM

## 2025-08-26 DIAGNOSIS — I10 ESSENTIAL HYPERTENSION: Primary | ICD-10-CM

## 2025-08-26 PROBLEM — E66.813 CLASS 3 SEVERE OBESITY WITH BODY MASS INDEX (BMI) OF 40.0 TO 44.9 IN ADULT: Status: ACTIVE | Noted: 2025-08-26

## 2025-08-26 PROBLEM — E66.813 CLASS 3 SEVERE OBESITY WITH BODY MASS INDEX (BMI) OF 40.0 TO 44.9 IN ADULT: Status: RESOLVED | Noted: 2025-08-26 | Resolved: 2025-08-26

## 2025-08-26 PROCEDURE — 3078F DIAST BP <80 MM HG: CPT | Performed by: STUDENT IN AN ORGANIZED HEALTH CARE EDUCATION/TRAINING PROGRAM

## 2025-08-26 PROCEDURE — 3075F SYST BP GE 130 - 139MM HG: CPT | Performed by: STUDENT IN AN ORGANIZED HEALTH CARE EDUCATION/TRAINING PROGRAM

## 2025-08-26 PROCEDURE — 99214 OFFICE O/P EST MOD 30 MIN: CPT | Performed by: STUDENT IN AN ORGANIZED HEALTH CARE EDUCATION/TRAINING PROGRAM

## 2025-08-26 RX ORDER — LISINOPRIL 5 MG/1
5 TABLET ORAL DAILY
Qty: 90 TABLET | Refills: 3 | Status: SHIPPED | OUTPATIENT
Start: 2025-08-26 | End: 2026-08-21

## 2025-08-26 RX ORDER — CARVEDILOL 12.5 MG/1
12.5 TABLET ORAL 2 TIMES DAILY WITH MEALS
Qty: 180 TABLET | Refills: 3 | Status: SHIPPED | OUTPATIENT
Start: 2025-08-26 | End: 2026-08-21

## 2025-08-26 RX ORDER — ALLOPURINOL 300 MG/1
TABLET ORAL
Qty: 90 TABLET | Refills: 3 | Status: SHIPPED | OUTPATIENT
Start: 2025-08-26

## 2025-08-26 ASSESSMENT — ENCOUNTER SYMPTOMS
FEVER: 0
COUGH: 0
PALPITATIONS: 0
HEMOPTYSIS: 0
CHILLS: 0

## 2025-08-26 ASSESSMENT — FIBROSIS 4 INDEX: FIB4 SCORE: 1.46
